# Patient Record
Sex: MALE | Race: WHITE | NOT HISPANIC OR LATINO | Employment: OTHER | URBAN - METROPOLITAN AREA
[De-identification: names, ages, dates, MRNs, and addresses within clinical notes are randomized per-mention and may not be internally consistent; named-entity substitution may affect disease eponyms.]

---

## 2020-07-08 ENCOUNTER — NURSING HOME VISIT (OUTPATIENT)
Dept: FAMILY MEDICINE CLINIC | Facility: CLINIC | Age: 70
End: 2020-07-08
Payer: MEDICARE

## 2020-07-08 DIAGNOSIS — F17.210 NICOTINE DEPENDENCE, CIGARETTES, UNCOMPLICATED: ICD-10-CM

## 2020-07-08 DIAGNOSIS — J44.9 CHRONIC OBSTRUCTIVE PULMONARY DISEASE, UNSPECIFIED COPD TYPE (HCC): ICD-10-CM

## 2020-07-08 DIAGNOSIS — T78.40XD ALLERGIC STATE, SUBSEQUENT ENCOUNTER: ICD-10-CM

## 2020-07-08 DIAGNOSIS — F41.9 ANXIETY: Primary | ICD-10-CM

## 2020-07-08 DIAGNOSIS — K21.9 GASTROESOPHAGEAL REFLUX DISEASE WITHOUT ESOPHAGITIS: ICD-10-CM

## 2020-07-08 DIAGNOSIS — J96.10 CHRONIC RESPIRATORY FAILURE, UNSPECIFIED WHETHER WITH HYPOXIA OR HYPERCAPNIA (HCC): ICD-10-CM

## 2020-07-08 PROCEDURE — 99309 SBSQ NF CARE MODERATE MDM 30: CPT | Performed by: NURSE PRACTITIONER

## 2020-07-15 ENCOUNTER — NURSING HOME VISIT (OUTPATIENT)
Dept: FAMILY MEDICINE CLINIC | Facility: CLINIC | Age: 70
End: 2020-07-15
Payer: MEDICARE

## 2020-07-15 DIAGNOSIS — J96.10 CHRONIC RESPIRATORY FAILURE, UNSPECIFIED WHETHER WITH HYPOXIA OR HYPERCAPNIA (HCC): ICD-10-CM

## 2020-07-15 DIAGNOSIS — J44.9 CHRONIC OBSTRUCTIVE PULMONARY DISEASE, UNSPECIFIED COPD TYPE (HCC): Primary | ICD-10-CM

## 2020-07-15 DIAGNOSIS — F31.9 BIPOLAR AFFECTIVE DISORDER, REMISSION STATUS UNSPECIFIED (HCC): ICD-10-CM

## 2020-07-15 DIAGNOSIS — F41.9 ANXIETY: ICD-10-CM

## 2020-07-15 DIAGNOSIS — K21.9 GASTROESOPHAGEAL REFLUX DISEASE WITHOUT ESOPHAGITIS: ICD-10-CM

## 2020-07-15 DIAGNOSIS — E87.6 HYPOKALEMIA: ICD-10-CM

## 2020-07-15 PROCEDURE — 99309 SBSQ NF CARE MODERATE MDM 30: CPT | Performed by: INTERNAL MEDICINE

## 2020-07-16 VITALS
HEART RATE: 74 BPM | DIASTOLIC BLOOD PRESSURE: 67 MMHG | TEMPERATURE: 98.7 F | SYSTOLIC BLOOD PRESSURE: 118 MMHG | WEIGHT: 145.2 LBS | OXYGEN SATURATION: 99 % | RESPIRATION RATE: 18 BRPM

## 2020-07-16 VITALS
DIASTOLIC BLOOD PRESSURE: 78 MMHG | HEART RATE: 76 BPM | RESPIRATION RATE: 18 BRPM | TEMPERATURE: 97.6 F | OXYGEN SATURATION: 98 % | WEIGHT: 145 LBS | SYSTOLIC BLOOD PRESSURE: 138 MMHG

## 2020-07-16 PROBLEM — F17.210 NICOTINE DEPENDENCE, CIGARETTES, UNCOMPLICATED: Status: ACTIVE | Noted: 2020-07-16

## 2020-07-16 PROBLEM — T78.40XA ALLERGIES: Status: ACTIVE | Noted: 2020-07-16

## 2020-07-16 RX ORDER — ALBUTEROL SULFATE 2.5 MG/3ML
2.5 SOLUTION RESPIRATORY (INHALATION) EVERY 6 HOURS PRN
COMMUNITY

## 2020-07-16 RX ORDER — ACETAMINOPHEN 325 MG/1
650 TABLET ORAL EVERY 6 HOURS PRN
COMMUNITY

## 2020-07-16 RX ORDER — CALCIUM CARBONATE 200(500)MG
1 TABLET,CHEWABLE ORAL EVERY 6 HOURS PRN
COMMUNITY

## 2020-07-16 RX ORDER — BENZONATATE 100 MG/1
100 CAPSULE ORAL 3 TIMES DAILY PRN
COMMUNITY

## 2020-07-16 RX ORDER — IPRATROPIUM BROMIDE AND ALBUTEROL SULFATE 2.5; .5 MG/3ML; MG/3ML
3 SOLUTION RESPIRATORY (INHALATION) EVERY 6 HOURS PRN
COMMUNITY

## 2020-07-16 RX ORDER — FAMOTIDINE 10 MG
10 TABLET ORAL 2 TIMES DAILY
COMMUNITY
End: 2020-08-27 | Stop reason: ALTCHOICE

## 2020-07-16 RX ORDER — GUAIFENESIN 600 MG
1200 TABLET, EXTENDED RELEASE 12 HR ORAL EVERY 12 HOURS SCHEDULED
COMMUNITY

## 2020-07-16 RX ORDER — LORATADINE 10 MG/1
10 TABLET ORAL DAILY
COMMUNITY
End: 2021-02-03

## 2020-07-16 RX ORDER — BUSPIRONE HYDROCHLORIDE 10 MG/1
10 TABLET ORAL 2 TIMES DAILY
COMMUNITY

## 2020-07-16 RX ORDER — DIPHENHYDRAMINE HCL 25 MG
25 TABLET ORAL EVERY 6 HOURS PRN
COMMUNITY
End: 2021-07-31

## 2020-07-16 RX ORDER — FLUTICASONE FUROATE AND VILANTEROL 200; 25 UG/1; UG/1
1 POWDER RESPIRATORY (INHALATION) DAILY
COMMUNITY
End: 2020-10-26

## 2020-07-16 NOTE — PROGRESS NOTES
Progress Note    Patient location:[facility]    Reason for visit: F/U COPD, Anxiety, History of bipolar disorder,GERD    Patients care was coordinated with nursing facility staff  Recent vitals, labs and updated medications were reviewed on [facility]  Past Medical, surgical, social, medication and allergy history and patients previous records reviewed  Problem List Items Addressed This Visit        Digestive    Gastroesophageal reflux disease without esophagitis     Stable on famotidine            Respiratory    [facility]D [facility]chronic obstructive pulmonary disease) ([facility] ) - Primary     Patient has advanced COPD  Currently remains on Breo inhaler, Incruse Ellipta, nebulizer treatment and albuterol inhaler p r n  Patient has been requesting to keep albuterol inhaler on his bedside, reports having frequent episodes of shortness of breath and not getting p r n  Inhalers on time  Per nurse patient has history of abusing inhalers, previously has been taking it every 1-2 hours by himself  Oxygen saturation for the most part remains between 97-98%  Patient has been on chronic oxygen therapy  I explained to the patient regarding potential side effects related to frequently taking albuterol including tachy arrhythmias  Patient continues to complaint of being short of breath most of the time  Will start patient on maintenance dose of prednisone 10 mg daily for now and gradually taper  Patient also has history of anxiety likely contributing to subjective feeling of being out of breath  Patient will be started on Paxil 20 mg daily  Patient reports taking Benadryl in the past which has helped with allergies  Per staff Benadryl is unavailable at present due to short supply  Continue Claritin for now  Above explained to the patient  Care coordinated with respiratory therapist          Chronic respiratory failure ([facility] )     Continue maintenance inhalers and continues oxygen    Add prednisone 10 mg daily            Other    Anxiety     Uncontrolled at present  Patient reports being on Paxil in the past   Will resume at 20 mg daily  Continue buspirone 10 mg b i d  Bipolar disorder [facility])     Patient reports having history of bipolar disorder  Had been on Lamictal in the past   Will consult Dr Rashawn Rosario from psychiatric service for further recommendations         Hypokalemia     Patient has history of hypokalemia  BMP is due on 07/17/2020  Will follow             HPI:   Pt is being seen per request of the nurse  Pt has been asking to keep his Albuterol inhaler at the bed side  Per nurse patient has history of abusing his inhalers by using them every 1-2 hours  Patient reports being short of breath most of the time  Remains on chronic oxygen therapy  Additionally states [facility] does not work for him  Patient appears anxious  Reports having history of anxiety and bipolar disorder in the past for which she was on Paxil and Lamictal before  Patient denies any fever chills nausea vomiting or diarrhea    He is additionally requesting to resume Benadryl  Upset about not receiving above  Patient had been on Benadryl in the past, lately it was switched over to Claritin secondary to short supply of Benadryl  Review of Systems   Constitutional: Positive for fatigue  Negative for chills and fever  HENT: Negative for nosebleeds and rhinorrhea  Eyes: Negative for discharge and redness  Respiratory: Positive for shortness of breath  Negative for cough, chest tightness, wheezing and stridor  Cardiovascular: Negative for chest pain and leg swelling  Gastrointestinal: Negative for abdominal distention, abdominal pain, diarrhea and vomiting  Genitourinary: Negative for dysuria, flank pain and hematuria  Musculoskeletal: Positive for gait problem  Negative for arthralgias and back pain  Skin: Negative for pallor  Neurological: Positive for weakness (Generalized)   Negative for tremors, seizures, syncope and headaches  Psychiatric/Behavioral: Negative for agitation, behavioral problems and confusion  The patient is nervous/anxious  Impaired short term memory noticed by nursing staff       Past Medical History:   Past Medical History:   Diagnosis Date    Acute and chronic respiratory failure with hypoxia ([facility] )     COPD (chronic obstructive pulmonary disease) ([facility] )     Delirium     Hypokalemia     Hyponatremia          Past Surgical History: No past surgical history on file  Social History:     Social History     Tobacco Use   Smoking Status Former Smoker   Tobacco Comment    Currently uses nicotine patches       Social History     Substance and Sexual Activity   Alcohol Use Not on file           Family History: No family history on file  Allergies:    Allergies   Allergen Reactions    Penicillins            Medications:   Current Outpatient Medications:     acetaminophen (TYLENOL) 325 mg tablet, Take 650 mg by mouth every 6 (six) hours as needed, Disp: , Rfl:     albuterol (2 5 mg/3 mL) 0 083 % nebulizer solution, Take 2 5 mg by nebulization every 6 (six) hours as needed, Disp: , Rfl:     benzonatate (TESSALON PERLES) 100 mg capsule, Take 100 mg by mouth 3 (three) times a day as needed, Disp: , Rfl:     busPIRone (BUSPAR) 10 mg tablet, Take 10 mg by mouth 2 (two) times a day, Disp: , Rfl:     calcium carbonate (TUMS) 500 mg chewable tablet, Chew 1 tablet every 6 (six) hours as needed, Disp: , Rfl:     diphenhydrAMINE (BENADRYL) 25 mg tablet, Take 25 mg by mouth every 6 (six) hours as needed, Disp: , Rfl:     famotidine (PEPCID) 10 mg tablet, Take 10 mg by mouth 2 (two) times a day, Disp: , Rfl:     fluticasone-vilanterol (BREO ELLIPTA) 200-25 MCG/INH inhaler, Inhale 1 puff daily Rinse mouth after use , Disp: , Rfl:     guaiFENesin (MUCINEX) 600 mg 12 hr tablet, Take 1,200 mg by mouth every 12 (twelve) hours, Disp: , Rfl:     ipratropium-albuterol (DUO-NEB) 0 5-2 5 mg/3 mL nebulizer solution, Take 3 mL by nebulization every 6 (six) hours, Disp: , Rfl:     loratadine (CLARITIN) 10 mg tablet, Take 10 mg by mouth daily, Disp: , Rfl:     nicotine (NICODERM CQ) 7 mg/24hr TD 24 hr patch, Place 1 patch on the skin every 24 hours, Disp: , Rfl:     sodium chloride (OCEAN) 0 65 % nasal spray, 1 spray into each nostril every 12 (twelve) hours as needed, Disp: , Rfl:     umeclidinium bromide (INCRUSE ELLIPTA) 62 5 mcg/inh AEPB inhaler, Inhale 1 puff daily, Disp: , Rfl:     Updated list was reviewed in [facility] of facility  Vitals:    07/16/20 1750   BP: 138/78   Pulse: 76   Resp: 18   Temp: 97 6 °F (36 4 °C)   SpO2: 98%         Physical Exam   Constitutional: He appears well-developed and well-nourished  No distress  Eyes: Right eye exhibits no discharge  Left eye exhibits no discharge  No scleral icterus  Cardiovascular: Normal rate, regular rhythm and normal heart sounds  Pulmonary/Chest: No stridor  He has no wheezes  Decrease BS B/L   Abdominal: Soft  He exhibits no distension  There is no tenderness  There is no guarding  Musculoskeletal: He exhibits no edema  Neurological: He is alert  No cranial nerve deficit  Skin: He is not diaphoretic  No pallor  Psychiatric: He has a normal mood and affect  anxious       Diagnostic Data:    Labs done in 5/20 revealed   Na 137, K 3 8, BUN 8, Creatinine   84  HGB 11 0, WBC 6 4,     Additional Notes:   Start Prednisone 10 mg daily  Start Paxil  Consult psychiatrist for hustory of bipolar disorder and evaluate need for lamictal  Dc incruse   Continue nebulizer treatment, Breo and prn Albuterol  Repeat labs on 7/17

## 2020-07-16 NOTE — ASSESSMENT & PLAN NOTE
Patient has advanced COPD  Currently remains on Breo inhaler, Incruse Ellipta, nebulizer treatment and albuterol inhaler p r n  Patient has been requesting to keep albuterol inhaler on his bedside, reports having frequent episodes of shortness of breath and not getting p r n  Inhalers on time  Per nurse patient has history of abusing inhalers, previously has been taking it every 1-2 hours by himself  Oxygen saturation for the most part remains between 97-98%  Patient has been on chronic oxygen therapy  I explained to the patient regarding potential side effects related to frequently taking albuterol including tachy arrhythmias  Patient continues to complaint of being short of breath most of the time  Will start patient on maintenance dose of prednisone 10 mg daily for now and gradually taper  Patient also has history of anxiety likely contributing to subjective feeling of being out of breath  Patient will be started on Paxil 20 mg daily  Patient reports taking Benadryl in the past which has helped with allergies  Per staff Benadryl is unavailable at present due to short supply  Continue Claritin for now    Above explained to the patient  Care coordinated with respiratory therapist

## 2020-07-16 NOTE — PROGRESS NOTES
[facility]  [address] [facility], Counts include 234 beds at the [facility] [place]  [phone]      PROGRESS NOTE  Name: Veronica Templeton  AGE: 69 y o  SEX: male    MRN: [id number]    DATE OF ENCOUNTER:  07/08/2020    Patient Location     [facility]    Reason For Visit/ Chief Complaint     Follow-up due to change in provider     Patients care was coordinated with nursing facility staff  Recent vitals, labs and [facility]  Past Medical, surgical, social, medication and allergy history and patients previous records reviewed  1  Anxiety  Assessment & Plan:  · Continue buspirone 10 mg b i d  For anxiety  · Will discuss with Dr Susan Horta increasing therapy or providing additional therapy for anxiety  2  Chronic respiratory failure, unspecified whether with hypoxia or hypercapnia ([facility])  Assessment & Plan:  · Continue ipratropium-albuterol 0 5-2 5 every 6 hours  3  Chronic obstructive pulmonary disease, unspecified COPD type ([facility] )  Assessment & Plan:  · Continue chronic oxygen therapy  · Continue Breo Ellipta 1 puff daily  · Continue Incruse Ellipta 1 puff daily  · Continue guaifenesin 600 mg b i d   · Continue benzonatate 100 mg every 8 hours p r n  · Change albuterol nebulizer to every 4 hours instead of every 8 hours  · Patient has been requesting to have hand-held albuterol at bedside  Past treatment on time  Patient has been previously known to abuse inhaled and use it more than his it is prescribed  I advised patient that I will talk to Dr Susan Horta regarding this  · Patient is in no acute respiratory distress currently and review of his oxygen sat have him at 95% or greater  Nurse also reports patient does not appear in any acute respiratory distress and oxygen sats are normal when he requests rescue inhaler    · Patient counseled on appropriate inhaler use, different inhaler types, and anxiety  4  Gastroesophageal reflux disease without esophagitis  Assessment & Plan:  · Stable  · Patient remains on famotidine 10 mg 2 tablets b i d       5  Nicotine dependence, cigarettes, uncomplicated  Assessment & Plan:  · Patient is currently on nicotine patch 24 hour 7 mg      6  Allergic state, subsequent encounter  Assessment & Plan:  · Patient remains on Claritin 10 mg          HPI      Johnathan Zapien is a 69 y o  male who is being seen today for was being seen today for a follow-up visit as he is currently under our service as of this week  Patient had previously been under the service of Dr Jem Mercado  Nurse requested patient be seen as patient has been requesting inhaler at bedside  It has been reported Mr Genesis Rivera abuses his medication and uses it more frequently than prescribed and sometimes almost emptying the unit  He is currently taking buspirone for anxiety  Nurse reports he has periods of forgetfulness  On examination patient is in no acute distress with oxygen saturation is 99% room air and the patient is requesting inhaler  Patient reports he only gets rescue inhaler when nurses give it to him and he reports they do not give it to him on time  Advised patient that he is also getting albuterol throughout the day in the form of a nebulizer  Patient reports that is on a maintenance inhaler  Advised patient this is also a rescue medicine and it also contains albuterol  Patient argumentative and stating it is not a rescue medication  Patient denies fever, chills, cough, nausea, vomiting, abdominal pain, chest pain, urinary symptoms  Review of Systems   Constitutional: Positive for fatigue  Negative for chills and fever  HENT: Negative for nosebleeds and rhinorrhea  Eyes: Negative for discharge and redness  Respiratory: Positive for shortness of breath  Negative for cough, chest tightness, wheezing and stridor      Cardiovascular: Negative for chest pain and leg swelling  Gastrointestinal: Negative for abdominal distention, abdominal pain, diarrhea and vomiting  Genitourinary: Negative for hematuria  Musculoskeletal: Positive for gait problem  Negative for arthralgias and back pain  Skin: Negative for pallor  Neurological: Positive for weakness (Generalized)  Negative for tremors, seizures and syncope  Psychiatric/Behavioral: Positive for behavioral problems  Negative for agitation  Past Medical History:   Diagnosis Date    Acute and chronic respiratory failure with hypoxia (HCC)     COPD (chronic obstructive pulmonary disease) (HCC)     Delirium     Hypokalemia     Hyponatremia        No past surgical history on file  Social History     Social History     Tobacco Use   Smoking Status Former Smoker   Tobacco Comment    Currently uses nicotine patches       Social History     Substance and Sexual Activity   Alcohol Use Not on file        No family history on file       Allergies   Allergen Reactions    Penicillins        Medications       Current Outpatient Medications:     acetaminophen (TYLENOL) 325 mg tablet, Take 650 mg by mouth every 6 (six) hours as needed, Disp: , Rfl:     albuterol (2 5 mg/3 mL) 0 083 % nebulizer solution, Take 2 5 mg by nebulization every 6 (six) hours as needed, Disp: , Rfl:     benzonatate (TESSALON PERLES) 100 mg capsule, Take 100 mg by mouth 3 (three) times a day as needed, Disp: , Rfl:     busPIRone (BUSPAR) 10 mg tablet, Take 10 mg by mouth 2 (two) times a day, Disp: , Rfl:     calcium carbonate (TUMS) 500 mg chewable tablet, Chew 1 tablet every 6 (six) hours as needed, Disp: , Rfl:     diphenhydrAMINE (BENADRYL) 25 mg tablet, Take 25 mg by mouth every 6 (six) hours as needed, Disp: , Rfl:     famotidine (PEPCID) 10 mg tablet, Take 10 mg by mouth 2 (two) times a day, Disp: , Rfl:     fluticasone-vilanterol (BREO ELLIPTA) 200-25 MCG/INH inhaler, Inhale 1 puff daily Rinse mouth after use , Disp: , Rfl:    guaiFENesin (MUCINEX) 600 mg 12 hr tablet, Take 1,200 mg by mouth every 12 (twelve) hours, Disp: , Rfl:     ipratropium-albuterol (DUO-NEB) 0 5-2 5 mg/3 mL nebulizer solution, Take 3 mL by nebulization every 6 (six) hours, Disp: , Rfl:     loratadine (CLARITIN) 10 mg tablet, Take 10 mg by mouth daily, Disp: , Rfl:     nicotine (NICODERM CQ) 7 mg/24hr TD 24 hr patch, Place 1 patch on the skin every 24 hours, Disp: , Rfl:     sodium chloride (OCEAN) 0 65 % nasal spray, 1 spray into each nostril every 12 (twelve) hours as needed, Disp: , Rfl:     umeclidinium bromide (INCRUSE ELLIPTA) 62 5 mcg/inh AEPB inhaler, Inhale 1 puff daily, Disp: , Rfl:     Updated list was reviewed in [facility] system of facility  Vitals:    07/08/20 0757   BP: 118/67   Pulse: 74   Resp: 18   Temp: 98 7 °F (37 1 °C)   SpO2: 99%       Physical Exam   Constitutional: He appears well-developed and well-nourished  No distress  HENT:   Head: Normocephalic and atraumatic  Eyes: Right eye exhibits no discharge  Left eye exhibits no discharge  No scleral icterus  Cardiovascular: Normal rate and regular rhythm  Pulmonary/Chest: Effort normal and breath sounds normal  No stridor  No respiratory distress  He has no wheezes  He has no rales  Abdominal: Soft  Bowel sounds are normal  He exhibits no distension  There is no tenderness  There is no guarding  Musculoskeletal: Normal range of motion  He exhibits no edema or deformity  Neurological: He is alert  Skin: Skin is warm and dry  No rash noted  He is not diaphoretic  No erythema  Psychiatric: His speech is normal  His mood appears anxious  He is agitated  No focal neurological changes noted  Diagnostic Data     Recent labs were reviewed  Labs dated 05/20/2020   WBC 6 4, HGB 11, platelets 220  , K 3 8, BUN 8, Cr 0 84    Additional Notes     Care coordination with nurse and Dr Gladys Morris    Spent greater than 25 minutes in patient care coordination, review of records, and counseling patient      This was electronically signed by STEVIE Valerio

## 2020-07-16 NOTE — ASSESSMENT & PLAN NOTE
· Continue chronic oxygen therapy  · Continue Breo Ellipta 1 puff daily  · Continue Incruse Ellipta 1 puff daily  · Continue guaifenesin 600 mg b i d   · Continue benzonatate 100 mg every 8 hours p r n  · Change albuterol nebulizer to every 4 hours instead of every 8 hours  · Patient has been requesting to have hand-held albuterol at bedside  Past treatment on time  Patient has been previously known to abuse inhaled and use it more than his it is prescribed  I advised patient that I will talk to Dr Charbel Garrett regarding this  · Patient is in no acute respiratory distress currently and review of his oxygen sat have him at 95% or greater  Nurse also reports patient does not appear in any acute respiratory distress and oxygen sats are normal when he requests rescue inhaler  · Patient counseled on appropriate inhaler use, different inhaler types, and anxiety

## 2020-07-16 NOTE — ASSESSMENT & PLAN NOTE
Patient reports having history of bipolar disorder    Had been on Lamictal in the past   Will consult Dr Rj Delgado from psychiatric service for further recommendations

## 2020-07-16 NOTE — ASSESSMENT & PLAN NOTE
· Continue buspirone 10 mg b i d  For anxiety  · Will discuss with Dr Petey Valle increasing therapy or providing additional therapy for anxiety

## 2020-07-16 NOTE — ASSESSMENT & PLAN NOTE
Uncontrolled at present  Patient reports being on Paxil in the past   Will resume at 20 mg daily  Continue buspirone 10 mg b i d  Speaking Coherently

## 2020-07-22 ENCOUNTER — NURSING HOME VISIT (OUTPATIENT)
Dept: FAMILY MEDICINE CLINIC | Facility: CLINIC | Age: 70
End: 2020-07-22
Payer: MEDICARE

## 2020-07-22 VITALS
HEART RATE: 65 BPM | DIASTOLIC BLOOD PRESSURE: 76 MMHG | OXYGEN SATURATION: 97 % | WEIGHT: 144.5 LBS | SYSTOLIC BLOOD PRESSURE: 127 MMHG | TEMPERATURE: 98.2 F | RESPIRATION RATE: 17 BRPM

## 2020-07-22 DIAGNOSIS — F31.9 BIPOLAR AFFECTIVE DISORDER, REMISSION STATUS UNSPECIFIED (HCC): ICD-10-CM

## 2020-07-22 DIAGNOSIS — F17.210 NICOTINE DEPENDENCE, CIGARETTES, UNCOMPLICATED: ICD-10-CM

## 2020-07-22 DIAGNOSIS — J96.10 CHRONIC RESPIRATORY FAILURE, UNSPECIFIED WHETHER WITH HYPOXIA OR HYPERCAPNIA (HCC): ICD-10-CM

## 2020-07-22 DIAGNOSIS — F41.9 ANXIETY: ICD-10-CM

## 2020-07-22 DIAGNOSIS — E55.9 VITAMIN D DEFICIENCY: ICD-10-CM

## 2020-07-22 DIAGNOSIS — K21.9 GASTROESOPHAGEAL REFLUX DISEASE WITHOUT ESOPHAGITIS: ICD-10-CM

## 2020-07-22 DIAGNOSIS — J44.9 CHRONIC OBSTRUCTIVE PULMONARY DISEASE, UNSPECIFIED COPD TYPE (HCC): Primary | ICD-10-CM

## 2020-07-22 DIAGNOSIS — E87.6 HYPOKALEMIA: ICD-10-CM

## 2020-07-22 PROCEDURE — 99309 SBSQ NF CARE MODERATE MDM 30: CPT | Performed by: INTERNAL MEDICINE

## 2020-07-22 NOTE — PROGRESS NOTES
Progress Note    Patient location:  [facility] rehab    Reason for visit:  Follow-up COPD, chronic respiratory failure, anxiety, hypokalemia, vitamin-D deficiency, bipolar disorder    Patients care was coordinated with nursing facility staff  Recent vitals, labs and updated medications were reviewed on [facility]  Past Medical, surgical, social, medication and allergy history and patients previous records reviewed  Problem List Items Addressed This Visit        Digestive    Gastroesophageal reflux disease without esophagitis     Famotidine was recently switched over to omeprazole            Respiratory    COPD (chronic obstructive pulmonary disease) ([facility] ) - Primary       Patient was recently started on prednisone 10 mg daily for ongoing dyspnea in the setting of advanced COPD  Reports feeling slightly better, tends to get short of breath with minimal exertion  Continue Breo,  nebulizer treatments and guaifenesin  Discontinue Incruse Ellipta as patient is on nebulizer treatments around the clock  Consult palliative care for chronic disease management         Chronic respiratory failure (HCC)     Continue oxygen and maintenance inhalers along with prednisone 10 mg daily            Other    Anxiety     Patient was recently started on Paxil 20 mg daily  Will monitor response on above  Patient additionally remains on Buspirone 10 mg b i d  Bipolar disorder ([facility] )     Patient was recently started on Lamictal 50 mg daily  Will monitor response on above  Psych service consulted on previous encounter         Hypokalemia     Recent potassium was within normal limits         Nicotine dependence, cigarettes, uncomplicated     Continue nicotine patch         Vitamin D deficiency     Recent vitamin-D level was 9  Patient was started on vitamin-D 44122 units once a week  Repeat vitamin-D level in 3 months             HPI:  Patient is being seen for follow-up visit today    He was seen last week for increased dyspnea  Patient has advance COPD and remains on nebulizer treatment, Breo, Incruise Ellipta and guaifenesin  He was recently started on prednisone 10 mg daily  Patient reports feeling better however he is concerned about getting pneumonia as his roommate was recently diagnosed with it  There have been no reports of  any fever chills nausea vomiting diarrhea or abdominal pain  Patient is noncompliant with care at times  Refuses to shower  Has been requesting to have albuterol inhaler on his bedside however per staff he has history of inhalers abuse where he was taking puffs every 1-2 hours    Review of Systems   Constitutional: Positive for fatigue  Negative for chills and fever  HENT: Negative for nosebleeds and rhinorrhea  Eyes: Negative for discharge and redness  Respiratory: Positive for shortness of breath (With minimal activities)  Negative for cough, chest tightness, wheezing and stridor  Cardiovascular: Negative for chest pain and leg swelling  Gastrointestinal: Negative for abdominal distention, abdominal pain, diarrhea and vomiting  Genitourinary: Negative for dysuria, flank pain and hematuria  Musculoskeletal: Positive for gait problem  Negative for arthralgias and back pain  Skin: Negative for pallor  Neurological: Positive for weakness (Generalized)  Negative for tremors, seizures, syncope and headaches  Psychiatric/Behavioral: Negative for agitation, behavioral problems and confusion  Past Medical History:   Past Medical History:   Diagnosis Date    Acute and chronic respiratory failure with hypoxia (HCC)     COPD (chronic obstructive pulmonary disease) (HCC)     Delirium     Hypokalemia     Hyponatremia          Past Surgical History: No past surgical history on file          Social History:     Social History     Tobacco Use   Smoking Status Former Smoker   Tobacco Comment    Currently uses nicotine patches       Social History Substance and Sexual Activity   Alcohol Use Not on file           Family History: No family history on file  Allergies: Allergies   Allergen Reactions    Penicillins            Medications:   Current Outpatient Medications:     acetaminophen (TYLENOL) 325 mg tablet, Take 650 mg by mouth every 6 (six) hours as needed, Disp: , Rfl:     albuterol (2 5 mg/3 mL) 0 083 % nebulizer solution, Take 2 5 mg by nebulization every 6 (six) hours as needed, Disp: , Rfl:     benzonatate (TESSALON PERLES) 100 mg capsule, Take 100 mg by mouth 3 (three) times a day as needed, Disp: , Rfl:     busPIRone (BUSPAR) 10 mg tablet, Take 10 mg by mouth 2 (two) times a day, Disp: , Rfl:     calcium carbonate (TUMS) 500 mg chewable tablet, Chew 1 tablet every 6 (six) hours as needed, Disp: , Rfl:     diphenhydrAMINE (BENADRYL) 25 mg tablet, Take 25 mg by mouth every 6 (six) hours as needed, Disp: , Rfl:     famotidine (PEPCID) 10 mg tablet, Take 10 mg by mouth 2 (two) times a day, Disp: , Rfl:     fluticasone-vilanterol (BREO ELLIPTA) 200-25 MCG/INH inhaler, Inhale 1 puff daily Rinse mouth after use , Disp: , Rfl:     guaiFENesin (MUCINEX) 600 mg 12 hr tablet, Take 1,200 mg by mouth every 12 (twelve) hours, Disp: , Rfl:     ipratropium-albuterol (DUO-NEB) 0 5-2 5 mg/3 mL nebulizer solution, Take 3 mL by nebulization every 6 (six) hours, Disp: , Rfl:     loratadine (CLARITIN) 10 mg tablet, Take 10 mg by mouth daily, Disp: , Rfl:     nicotine (NICODERM CQ) 7 mg/24hr TD 24 hr patch, Place 1 patch on the skin every 24 hours, Disp: , Rfl:     sodium chloride (OCEAN) 0 65 % nasal spray, 1 spray into each nostril every 12 (twelve) hours as needed, Disp: , Rfl:     umeclidinium bromide (INCRUSE ELLIPTA) 62 5 mcg/inh AEPB inhaler, Inhale 1 puff daily, Disp: , Rfl:     Updated list was reviewed in [facility] of facility         Vitals:    07/22/20 1308   BP: 127/76   Pulse: 65   Resp: 17   Temp: 98 2 °F (36 8 °C) SpO2: 97%         Physical Exam   Constitutional: No distress  Eyes: Right eye exhibits no discharge  Left eye exhibits no discharge  No scleral icterus  Cardiovascular: Normal rate and regular rhythm  Pulmonary/Chest: No stridor  He has no wheezes  He has no rales  Abdominal: Soft  He exhibits no distension  There is no tenderness  There is no guarding  Musculoskeletal: He exhibits no edema  Neurological: He is alert  No cranial nerve deficit  Skin: He is not diaphoretic  No pallor  Psychiatric: He has a normal mood and affect  His behavior is normal        Diagnostic Data:    Recent labs were reviewed  Vitamin-D level was 9   CBC CMP and TSH were within normal limits    Additional Notes:   Consult palliative care team for chronic disease management

## 2020-07-22 NOTE — ASSESSMENT & PLAN NOTE
Patient was recently started on Paxil 20 mg daily  Will monitor response on above  Patient additionally remains on Buspirone 10 mg b i d

## 2020-07-22 NOTE — ASSESSMENT & PLAN NOTE
Patient was recently started on prednisone 10 mg daily for ongoing dyspnea in the setting of advanced COPD  Reports feeling slightly better, tends to get short of breath with minimal exertion  Continue Breo,  nebulizer treatments and guaifenesin  Discontinue Incruse Ellipta as patient is on nebulizer treatments around the clock     Consult palliative care for chronic disease management

## 2020-07-22 NOTE — ASSESSMENT & PLAN NOTE
Recent vitamin-D level was 9  Patient was started on vitamin-D 50370 units once a week    Repeat vitamin-D level in 3 months

## 2020-07-22 NOTE — ASSESSMENT & PLAN NOTE
Patient was recently started on Lamictal 50 mg daily  Will monitor response on above    Psych service consulted on previous encounter

## 2020-08-26 ENCOUNTER — NURSING HOME VISIT (OUTPATIENT)
Dept: FAMILY MEDICINE CLINIC | Facility: CLINIC | Age: 70
End: 2020-08-26
Payer: MEDICARE

## 2020-08-26 VITALS
WEIGHT: 159 LBS | RESPIRATION RATE: 18 BRPM | TEMPERATURE: 98.2 F | DIASTOLIC BLOOD PRESSURE: 72 MMHG | OXYGEN SATURATION: 98 % | SYSTOLIC BLOOD PRESSURE: 127 MMHG | HEART RATE: 74 BPM

## 2020-08-26 DIAGNOSIS — F41.9 ANXIETY: Primary | ICD-10-CM

## 2020-08-26 DIAGNOSIS — E55.9 VITAMIN D DEFICIENCY: ICD-10-CM

## 2020-08-26 DIAGNOSIS — E87.6 HYPOKALEMIA: ICD-10-CM

## 2020-08-26 DIAGNOSIS — J96.10 CHRONIC RESPIRATORY FAILURE, UNSPECIFIED WHETHER WITH HYPOXIA OR HYPERCAPNIA (HCC): ICD-10-CM

## 2020-08-26 DIAGNOSIS — K21.9 GASTROESOPHAGEAL REFLUX DISEASE WITHOUT ESOPHAGITIS: ICD-10-CM

## 2020-08-26 DIAGNOSIS — J44.9 CHRONIC OBSTRUCTIVE PULMONARY DISEASE, UNSPECIFIED COPD TYPE (HCC): ICD-10-CM

## 2020-08-26 DIAGNOSIS — F31.9 BIPOLAR AFFECTIVE DISORDER, REMISSION STATUS UNSPECIFIED (HCC): ICD-10-CM

## 2020-08-26 PROCEDURE — 99308 SBSQ NF CARE LOW MDM 20: CPT | Performed by: NURSE PRACTITIONER

## 2020-08-26 NOTE — PROGRESS NOTES
[facility]  [address] [facility], [facility] [place]  [phone]    PROGRESS NOTE    Name: Ginette Rivas  AGE: 69 y o  SEX: male    MRN: [id number]    DATE OF ENCOUNTER:  08/26/2020    Patient Location     [facility]    Reason For Visit/ Chief Complaint     Follow-up visit:  Bipolar disorder, anxiety disorder, chronic respiratory failure, COPD, hypokalemia, vitamin-D deficiency  1  Anxiety  Assessment & Plan:  · Patient remains on buspirone 10 mg b i d  And paroxetine 20 mg daily  · On examination patient appears more comfortable when section min previous visits  2  Chronic respiratory failure, unspecified whether with hypoxia or hypercapnia ([facility] )  Assessment & Plan:  · Patient remains on maintenance inhalers and prednisone  · He reports no bronchospasm exacerbations lately  · He continues to be on 2 L of oxygen continuously  3  Hypokalemia  Assessment & Plan:  · Potassium on a 07/17/2020 was 3 7       4  Vitamin D deficiency  Assessment & Plan:  · Continue vitamin-D 62499 units weekly  · Follow-up labs in several months  5  Bipolar affective disorder, remission status unspecified ([facility] )  Assessment & Plan:  · Patient in pleasant mood today  · He is currently on Lamictal 50 mg daily  · No behavioral problems have been reported recently  6  Chronic obstructive pulmonary disease, unspecified COPD type ([facility] )  Assessment & Plan:  · Patient reports breathing better  · He continues on his nebulizer treatments and oxygen continuously  7  Gastroesophageal reflux disease without esophagitis  Assessment & Plan:  · Stable  · Continue omeprazole 20 mg b i d  HPI      Ginette Rivas is a 69 y o  male who is being seen today for of follow-up visit today  Nurse reports no acute issues    His behaviors have been stable and he has not had any acute respiratory bronchospasms lately with new medication regimen  On examination patient appears pleasant and is in no acute respiratory distress  He reports he is has a good appetite and and his weights have increased  He is on continuous oxygen  He denies fever, abdominal pain, nausea, vomiting, diarrhea, constipation  Review of Systems   Constitutional: Positive for fatigue  Negative for activity change, chills and fever  HENT: Negative for congestion, nosebleeds, rhinorrhea and sore throat  Eyes: Negative for discharge and redness  Respiratory: Negative for cough, chest tightness, shortness of breath, wheezing and stridor  Cardiovascular: Negative for chest pain and leg swelling  Gastrointestinal: Negative for abdominal distention, abdominal pain, diarrhea and vomiting  Genitourinary: Negative for dysuria, flank pain and hematuria  Musculoskeletal: Positive for gait problem  Negative for arthralgias and back pain  Skin: Negative for color change, pallor and wound  Neurological: Positive for weakness (Generalized)  Negative for dizziness, tremors, seizures, syncope and headaches  Psychiatric/Behavioral: Negative for agitation, behavioral problems and confusion  Past Medical History:   Diagnosis Date    Acute and chronic respiratory failure with hypoxia ([facility])     COPD (chronic obstructive pulmonary disease) ([facility])     Delirium     Hypokalemia     Hyponatremia        No past surgical history on file      Social History     Social History     Tobacco Use   Smoking Status Former Smoker   Tobacco Comment    Currently uses nicotine patches       Social History     Substance and Sexual Activity   Alcohol Use Not on file       Family History: non-contributory    Allergies   Allergen Reactions    Penicillins        Medications       Current Outpatient Medications:     acetaminophen (TYLENOL) 325 mg tablet, Take 650 mg by mouth every 6 (six) hours as needed, Disp: , Rfl:     albuterol (2 5 mg/3 mL) 0 083 % nebulizer solution, Take 2 5 mg by nebulization every 6 (six) hours as needed, Disp: , Rfl:     benzonatate (TESSALON PERLES) 100 mg capsule, Take 100 mg by mouth 3 (three) times a day as needed, Disp: , Rfl:     busPIRone (BUSPAR) 10 mg tablet, Take 10 mg by mouth 2 (two) times a day, Disp: , Rfl:     calcium carbonate (TUMS) 500 mg chewable tablet, Chew 1 tablet every 6 (six) hours as needed, Disp: , Rfl:     diphenhydrAMINE (BENADRYL) 25 mg tablet, Take 25 mg by mouth every 6 (six) hours as needed, Disp: , Rfl:     ergocalciferol (ERGOCALCIFEROL) 1 25 MG (12964 UT) capsule, Take 50,000 Units by mouth once a week, Disp: , Rfl:     guaiFENesin (MUCINEX) 600 mg 12 hr tablet, Take 1,200 mg by mouth every 12 (twelve) hours, Disp: , Rfl:     ipratropium-albuterol (DUO-NEB) 0 5-2 5 mg/3 mL nebulizer solution, Take 3 mL by nebulization every 6 (six) hours, Disp: , Rfl:     lamoTRIgine (LaMICtal) 25 mg tablet, Take 50 mg by mouth daily, Disp: , Rfl:     loratadine (CLARITIN) 10 mg tablet, Take 10 mg by mouth daily, Disp: , Rfl:     omeprazole (PriLOSEC) 20 mg delayed release capsule, Take 20 mg by mouth 2 (two) times a day, Disp: , Rfl:     PARoxetine (PAXIL) 20 mg tablet, Take 20 mg by mouth daily, Disp: , Rfl:     predniSONE 5 mg tablet, Take 10 mg by mouth daily, Disp: , Rfl:     sodium chloride (OCEAN) 0 65 % nasal spray, 1 spray into each nostril every 12 (twelve) hours as needed, Disp: , Rfl:     fluticasone-vilanterol (BREO ELLIPTA) 200-25 MCG/INH inhaler, Inhale 1 puff daily Rinse mouth after use , Disp: , Rfl:     nicotine (NICODERM CQ) 7 mg/24hr TD 24 hr patch, Place 1 patch on the skin every 24 hours, Disp: , Rfl:     Updated list was reviewed in [address] [place] [address]  Vitals:    08/26/20 1346   BP: 127/72   Pulse: 74   Resp: 18   Temp: 98 2 °F (36 8 °C)   SpO2: 98%       Physical Exam  Vitals signs and nursing note reviewed  Constitutional:       General: He is not in acute distress       Appearance: He is well-developed  He is not diaphoretic  HENT:      Head: Normocephalic and atraumatic  Eyes:      General: No scleral icterus  Right eye: No discharge  Left eye: No discharge  Pulmonary:      Effort: No respiratory distress  Breath sounds: No wheezing  Abdominal:      General: There is no distension  Musculoskeletal:         General: No deformity  Skin:     General: Skin is dry  Neurological:      Mental Status: He is alert  Diagnostic Data     Recent labs were reviewed   07/17/2020  o Na 138, K 3 7, BUN 9, Cr 0 7  o WBC 6 8, Hgb 11 5, Hct 38    Additional Notes      Patients care was coordinated with nursing facility staff  Recent vitals, labs and updated medications were reviewed on [facility] system of facility  Past Medical, surgical, social, medication and allergy history and patients previous records reviewed       This was electronically signed by STEVIE Lorenzo

## 2020-08-27 RX ORDER — LAMOTRIGINE 25 MG/1
50 TABLET ORAL DAILY
COMMUNITY

## 2020-08-27 RX ORDER — PAROXETINE HYDROCHLORIDE 20 MG/1
10 TABLET, FILM COATED ORAL DAILY
COMMUNITY

## 2020-08-27 RX ORDER — OMEPRAZOLE 20 MG/1
20 CAPSULE, DELAYED RELEASE ORAL 2 TIMES DAILY
COMMUNITY

## 2020-08-27 RX ORDER — ERGOCALCIFEROL (VITAMIN D2) 1250 MCG
50000 CAPSULE ORAL WEEKLY
COMMUNITY

## 2020-08-27 RX ORDER — PREDNISONE 1 MG/1
7.5 TABLET ORAL DAILY
COMMUNITY
End: 2021-07-31

## 2020-08-27 NOTE — ASSESSMENT & PLAN NOTE
· Patient remains on buspirone 10 mg b i d  And paroxetine 20 mg daily  · On examination patient appears more comfortable when section min previous visits

## 2020-08-27 NOTE — ASSESSMENT & PLAN NOTE
· Patient reports breathing better  · He continues on his nebulizer treatments and oxygen continuously

## 2020-08-27 NOTE — ASSESSMENT & PLAN NOTE
· Patient remains on maintenance inhalers and prednisone  · He reports no bronchospasm exacerbations lately  · He continues to be on 2 L of oxygen continuously

## 2020-08-27 NOTE — ASSESSMENT & PLAN NOTE
· Patient in pleasant mood today  · He is currently on Lamictal 50 mg daily  · No behavioral problems have been reported recently

## 2020-10-26 ENCOUNTER — NURSING HOME VISIT (OUTPATIENT)
Dept: GERIATRICS | Facility: OTHER | Age: 70
End: 2020-10-26
Payer: MEDICARE

## 2020-10-26 VITALS
OXYGEN SATURATION: 93 % | WEIGHT: 160 LBS | SYSTOLIC BLOOD PRESSURE: 139 MMHG | RESPIRATION RATE: 18 BRPM | HEART RATE: 71 BPM | TEMPERATURE: 98.1 F | DIASTOLIC BLOOD PRESSURE: 80 MMHG

## 2020-10-26 DIAGNOSIS — F41.9 ANXIETY: ICD-10-CM

## 2020-10-26 DIAGNOSIS — J44.9 CHRONIC OBSTRUCTIVE PULMONARY DISEASE, UNSPECIFIED COPD TYPE (HCC): Primary | ICD-10-CM

## 2020-10-26 DIAGNOSIS — T78.40XD ALLERGY, SUBSEQUENT ENCOUNTER: ICD-10-CM

## 2020-10-26 DIAGNOSIS — F17.210 NICOTINE DEPENDENCE, CIGARETTES, UNCOMPLICATED: ICD-10-CM

## 2020-10-26 DIAGNOSIS — F31.9 BIPOLAR AFFECTIVE DISORDER, REMISSION STATUS UNSPECIFIED (HCC): ICD-10-CM

## 2020-10-26 DIAGNOSIS — E55.9 VITAMIN D DEFICIENCY: ICD-10-CM

## 2020-10-26 DIAGNOSIS — K21.9 GASTROESOPHAGEAL REFLUX DISEASE WITHOUT ESOPHAGITIS: ICD-10-CM

## 2020-10-26 PROCEDURE — 99309 SBSQ NF CARE MODERATE MDM 30: CPT | Performed by: INTERNAL MEDICINE

## 2020-12-10 ENCOUNTER — TELEMEDICINE (OUTPATIENT)
Dept: GERIATRICS | Facility: OTHER | Age: 70
End: 2020-12-10
Payer: MEDICARE

## 2020-12-10 VITALS
DIASTOLIC BLOOD PRESSURE: 68 MMHG | WEIGHT: 163.4 LBS | HEART RATE: 64 BPM | TEMPERATURE: 97.4 F | SYSTOLIC BLOOD PRESSURE: 123 MMHG

## 2020-12-10 DIAGNOSIS — E55.9 VITAMIN D DEFICIENCY: ICD-10-CM

## 2020-12-10 DIAGNOSIS — K21.9 GASTROESOPHAGEAL REFLUX DISEASE WITHOUT ESOPHAGITIS: ICD-10-CM

## 2020-12-10 DIAGNOSIS — J44.9 CHRONIC OBSTRUCTIVE PULMONARY DISEASE, UNSPECIFIED COPD TYPE (HCC): Primary | ICD-10-CM

## 2020-12-10 DIAGNOSIS — F31.9 BIPOLAR AFFECTIVE DISORDER, REMISSION STATUS UNSPECIFIED (HCC): ICD-10-CM

## 2020-12-10 DIAGNOSIS — F41.9 ANXIETY: ICD-10-CM

## 2020-12-10 PROCEDURE — 99309 SBSQ NF CARE MODERATE MDM 30: CPT | Performed by: INTERNAL MEDICINE

## 2021-01-04 ENCOUNTER — OFFICE VISIT (OUTPATIENT)
Dept: PULMONOLOGY | Facility: CLINIC | Age: 71
End: 2021-01-04
Payer: MEDICARE

## 2021-01-04 VITALS
WEIGHT: 164.38 LBS | TEMPERATURE: 97.6 F | SYSTOLIC BLOOD PRESSURE: 138 MMHG | BODY MASS INDEX: 22.26 KG/M2 | HEIGHT: 72 IN | HEART RATE: 80 BPM | DIASTOLIC BLOOD PRESSURE: 76 MMHG | OXYGEN SATURATION: 90 %

## 2021-01-04 DIAGNOSIS — Z87.891 PERSONAL HISTORY OF TOBACCO USE: ICD-10-CM

## 2021-01-04 DIAGNOSIS — J43.9 PULMONARY EMPHYSEMA, UNSPECIFIED EMPHYSEMA TYPE (HCC): Primary | ICD-10-CM

## 2021-01-04 PROBLEM — F17.210 NICOTINE DEPENDENCE, CIGARETTES, UNCOMPLICATED: Status: RESOLVED | Noted: 2020-07-16 | Resolved: 2021-01-04

## 2021-01-04 PROCEDURE — 99204 OFFICE O/P NEW MOD 45 MIN: CPT | Performed by: INTERNAL MEDICINE

## 2021-01-04 NOTE — ASSESSMENT & PLAN NOTE
This patient has likely quite severe COPD  I do not have any clear records of previous PFTs will but will research that through [facility]  Note made of 1 exacerbation earlier this year and then exacerbation caused by COVID-19  Lung cancer screening seems appropriate  Trial of long-acting bronchodilators could be recommended such as Anoro or equivalent  Because of exacerbations patient should be maintained on inhaled steroid although oral steroid might be equivalent for this indication  Patient may eventually need narcotic therapy for quite severe dyspnea    I recommend oxygen 3 liters/minute with activities and sleep but not at rest

## 2021-01-04 NOTE — ASSESSMENT & PLAN NOTE
Lung cancer screening seems appropriate for this patient  His smoking history is well over 30 pack years and he stopped 2 years ago

## 2021-01-04 NOTE — PROGRESS NOTES
Assessment/Plan:    COPD (chronic obstructive pulmonary disease) ([facility] )  This patient has likely quite severe COPD  I do not have any clear records of previous PFTs will but will research that through [facility]  Note made of 1 exacerbation earlier this year and then exacerbation caused by COVID-19  Lung cancer screening seems appropriate  Trial of long-acting bronchodilators could be recommended such as Anoro or equivalent  Because of exacerbations patient should be maintained on inhaled steroid although oral steroid might be equivalent for this indication  Patient may eventually need narcotic therapy for quite severe dyspnea  I recommend oxygen 3 liters/minute with activities and sleep but not at rest     Personal history of tobacco use  Lung cancer screening seems appropriate for this patient  His smoking history is well over 30 pack years and he stopped 2 years ago  Diagnoses and all orders for this visit:    Pulmonary emphysema, unspecified emphysema type ([facility] )    Personal history of tobacco use          Subjective:      Patient ID: Araceli Sands is a 69 y o  male  This patient was referred for 2 weeks with for evaluation of apparent COPD  I am not sure if all of the historical information from this patient is truly accurate as he seems quite forgetful at times  Patient states that he has been symptomatic about 5 years  Several previous hospitalizations at [facility] presumably for COPD  There is a record of an exacerbation in February of this year and then a subsequent 1 in May age the latter related to COVID-19 infection  Patient is not overly aware of this last diagnosis  Stop smoking 2 years ago  Dyspnea on exertion grade 3  Patient is very worried about suffocating at the end of his life  Wheezing at times  Not much coughing  Patient apparently produces sputum but always swallows it  No edema  Patient has been maintained on intermittent dosing with DuoNeb  Last time he left [address] was recommended  We did discuss end of life care  Patient states that he would accept short-term ventilation if needed but not long-term  Does not feel that there would be value of doing CPR  The following portions of the patient's history were reviewed and updated as appropriate: allergies, current medications, past family history, past medical history, past social history, past surgical history and problem list     Review of Systems   Constitutional: Positive for activity change and unexpected weight change  HENT: Positive for congestion and sinus pressure  Nasal obstruction   Respiratory: Positive for shortness of breath and wheezing  Negative for cough  Cardiovascular: Positive for palpitations (Patient states yes, no diagnosis made)  Negative for chest pain and leg swelling  Gastrointestinal: Negative for abdominal pain  Endocrine: Negative for cold intolerance and heat intolerance  Genitourinary: Negative for difficulty urinating  Musculoskeletal: Negative for arthralgias and myalgias  Allergic/Immunologic: Negative for environmental allergies  Neurological: Negative  Hematological: Negative for adenopathy  Objective:      /76 (BP Location: Right arm, Patient Position: Sitting, Cuff Size: Adult)   Pulse 80   Temp 97 6 °F (36 4 °C) (Tympanic)   Ht 6' (1 829 m)   Wt 74 6 kg (164 lb 6 oz)   SpO2 90% Comment: Room air at rest  BMI 22 29 kg/m²          Physical Exam  Vitals signs reviewed  Constitutional:       General: He is not in acute distress  Appearance: He is normal weight  He is ill-appearing (Chronic)  Comments: Examined in wheelchair   HENT:      Head: Normocephalic  Neck:      Musculoskeletal: No neck rigidity or muscular tenderness  Vascular: No JVD  Cardiovascular:      Rate and Rhythm: Normal rate and regular rhythm        Pulses:           Radial pulses are 3+ on the right side and 3+ on the left side  Heart sounds: Normal heart sounds  Pulmonary:      Effort: Pulmonary effort is normal  No respiratory distress  Breath sounds: Decreased air movement present  Examination of the right-lower field reveals decreased breath sounds and wheezing  Examination of the left-lower field reveals decreased breath sounds and wheezing  Decreased breath sounds and wheezing ( early expiratory) present  Comments: Speaks fairly easily in spite of complain of dyspnea  Hyper resonant percussion note  Abdominal:      General: Abdomen is flat  There is no distension  Palpations: Abdomen is soft  There is no hepatomegaly or splenomegaly  Musculoskeletal:         General: No swelling  Right lower leg: No edema  Left lower leg: No edema  Lymphadenopathy:      Cervical: No cervical adenopathy  Skin:     General: Skin is warm and dry  Findings: No rash  Neurological:      Mental Status: He is alert and oriented to person, place, and time        Comments: Suspect some memory loss   Psychiatric:         Mood and Affect: Mood normal          Behavior: Behavior normal

## 2021-02-03 ENCOUNTER — NURSING HOME VISIT (OUTPATIENT)
Dept: GERIATRICS | Facility: OTHER | Age: 71
End: 2021-02-03
Payer: MEDICARE

## 2021-02-03 VITALS
BODY MASS INDEX: 21.89 KG/M2 | RESPIRATION RATE: 20 BRPM | SYSTOLIC BLOOD PRESSURE: 134 MMHG | WEIGHT: 161.4 LBS | TEMPERATURE: 97.1 F | OXYGEN SATURATION: 96 % | HEART RATE: 76 BPM | DIASTOLIC BLOOD PRESSURE: 67 MMHG

## 2021-02-03 DIAGNOSIS — J43.9 PULMONARY EMPHYSEMA, UNSPECIFIED EMPHYSEMA TYPE (HCC): Primary | ICD-10-CM

## 2021-02-03 DIAGNOSIS — F31.9 BIPOLAR AFFECTIVE DISORDER, REMISSION STATUS UNSPECIFIED (HCC): ICD-10-CM

## 2021-02-03 DIAGNOSIS — F41.9 ANXIETY: ICD-10-CM

## 2021-02-03 DIAGNOSIS — E55.9 VITAMIN D DEFICIENCY: ICD-10-CM

## 2021-02-03 DIAGNOSIS — E87.1 HYPONATREMIA: ICD-10-CM

## 2021-02-03 DIAGNOSIS — E87.6 HYPOKALEMIA: ICD-10-CM

## 2021-02-03 PROCEDURE — 99309 SBSQ NF CARE MODERATE MDM 30: CPT | Performed by: INTERNAL MEDICINE

## 2021-02-03 NOTE — PROGRESS NOTES
[address]  [facility] [facility]   301 [facility] 83,8Th [address] 3214 [facility] [address]  49     Progress Note  Code  [facility] 32    Patient Location      [address] [place] [address] rehab    Reason for visit     Follow-up COPD, bipolar disorder, vitamin-D deficiency, anxiety    Patients care was coordinated with nursing facility staff  Recent vitals, labs and updated medications were reviewed on [facility]  Past Medical, surgical, social, medication and allergy history and patients previous records reviewed  Problem List Items Addressed This Visit        Respiratory    COPD [facility]chronic obstructive pulmonary disease) ([facility] ) - Primary      Patient is known to have severe COPD, remains on chronic oxygen and prednisone 5 mg daily  Patient additionally has been using nebulizer treatment 5 times a day  Patient reports dyspnea with minimal exertion  He is concerned about suffocating at the end of life  Recent pulmonary consultation notes reviewed  Will start patient on morphine p r n  for comfort during episodes of severe dyspnea  Above discussed with the patient, agrees with the plan            Other    Anxiety      Continue buspirone and Paxil         Bipolar disorder ([facility] 75 )      Stable  Patient remains on lamotrigine and buspirone  Hypokalemia      Stable on KCl 20 mEq daily         Vitamin D deficiency      Continue vitamin-D supplements         Hyponatremia      Resolved on last BMP  Sodium level was 142 on 12/14/20                   HPI      Patient is being seen for a follow-up visit today  He was recently evaluated by pulmonary service for COPD  Notes reviewed  Patient reports having chronic shortness of breath not any worse than usual   Patient states he has learned to live with shortness of breath  He has been taking nebulizer treatments 5 to 6 times a day  Additionally remains on prednisone 5 mg daily Patient is concerned about suffocating towards end of life  Code status discussed with the patient  Patient wishes to be a full code if there are any chances of recovery however does not wish to be kept on the ventilator long term  Patient denies any chest pain, GI or  complaints  He has not had any fever or  chills        Review of Systems   Constitutional: Positive for fatigue  Negative for chills and fever  HENT: Negative for nosebleeds and rhinorrhea  Eyes: Negative for discharge and redness  Respiratory: Positive for shortness of breath  Negative for cough, chest tightness, wheezing and stridor  Patient reports having chronic dyspnea especially with exertion   Cardiovascular: Negative for chest pain and leg swelling  Gastrointestinal: Negative for abdominal distention, abdominal pain, diarrhea and vomiting  Genitourinary: Negative for dysuria, flank pain and hematuria  Musculoskeletal: Positive for gait problem  Negative for arthralgias and back pain  Skin: Negative for pallor  Neurological: Negative for tremors, seizures, syncope, weakness (Generalized) and headaches  Psychiatric/Behavioral: Negative for agitation, behavioral problems and confusion  Past Medical History:   Diagnosis Date    Acute and chronic respiratory failure with hypoxia (HCC)     Asthma     COPD (chronic obstructive pulmonary disease) (HCC)     Delirium     Hypokalemia     Hyponatremia        No past surgical history on file      Social History     Tobacco Use   Smoking Status Former Smoker    Packs/day: 2 00    Types: Cigarettes    Quit date: 2018    Years since quitting: 3 0   Smokeless Tobacco Former User    Types: Chew       Family History   Problem Relation Age of Onset    Brain cancer Mother     Heart disease Father         Allergies   Allergen Reactions    Penicillins          Current Outpatient Medications:     acetaminophen (TYLENOL) 325 mg tablet, Take 650 mg by mouth every 6 (six) hours as needed, Disp: , Rfl:     albuterol (2 5 mg/3 mL) 0 083 % nebulizer solution, Take 2 5 mg by nebulization every 6 (six) hours as needed, Disp: , Rfl:     benzonatate (TESSALON PERLES) 100 mg capsule, Take 100 mg by mouth 3 (three) times a day as needed, Disp: , Rfl:     busPIRone (BUSPAR) 10 mg tablet, Take 10 mg by mouth 2 (two) times a day, Disp: , Rfl:     calcium carbonate (TUMS) 500 mg chewable tablet, Chew 1 tablet every 6 (six) hours as needed, Disp: , Rfl:     diphenhydrAMINE (BENADRYL) 25 mg tablet, Take 25 mg by mouth every 6 (six) hours as needed, Disp: , Rfl:     ergocalciferol (ERGOCALCIFEROL) 1 25 MG (78489 UT) capsule, Take 50,000 Units by mouth once a week, Disp: , Rfl:     guaiFENesin (MUCINEX) 600 mg 12 hr tablet, Take 1,200 mg by mouth every 12 (twelve) hours, Disp: , Rfl:     ipratropium-albuterol (DUO-NEB) 0 5-2 5 mg/3 mL nebulizer solution, Take 3 mL by nebulization every 6 (six) hours, Disp: , Rfl:     lamoTRIgine (LaMICtal) 25 mg tablet, Take 50 mg by mouth daily, Disp: , Rfl:     magnesium hydroxide (MILK OF MAGNESIA) 400 mg/5 mL oral suspension, Take by mouth daily as needed for constipation, Disp: , Rfl:     omeprazole (PriLOSEC) 20 mg delayed release capsule, Take 20 mg by mouth 2 (two) times a day, Disp: , Rfl:     PARoxetine (PAXIL) 20 mg tablet, Take 20 mg by mouth daily, Disp: , Rfl:     predniSONE 5 mg tablet, Take 7 5 mg by mouth daily , Disp: , Rfl:     sodium chloride (OCEAN) 0 65 % nasal spray, 1 spray into each nostril every 12 (twelve) hours as needed, Disp: , Rfl:     Updated list was reviewed in [facility] system of facility  Vitals:    02/03/21 1240   BP: 134/67   Pulse: 76   Resp: 20   Temp: (!) 97 1 °F (36 2 °C)   SpO2: 96%       Physical Exam  Constitutional:       General: He is not in acute distress  Appearance: He is well-developed  He is not diaphoretic  HENT:      Head: Normocephalic and atraumatic  Nose: No rhinorrhea  Eyes:      General: No scleral icterus          Right eye: No discharge  Left eye: No discharge  Neck:      Musculoskeletal: Neck supple  Cardiovascular:      Rate and Rhythm: Normal rate and regular rhythm  Pulmonary:      Breath sounds: No stridor  No wheezing  Comments: Decreased breath sounds bilaterally  Abdominal:      General: There is no distension  Palpations: Abdomen is soft  Tenderness: There is no abdominal tenderness  There is no guarding  Musculoskeletal:      Right lower leg: No edema  Left lower leg: No edema  Skin:     Coloration: Skin is not jaundiced or pale  Findings: No bruising  Neurological:      General: No focal deficit present  Mental Status: He is alert and oriented to person, place, and time  Cranial Nerves: No cranial nerve deficit  Psychiatric:         Mood and Affect: Mood normal          Behavior: Behavior normal          Diagnostic Data:    Recent labs were reviewed  labs done on 12/14/2020 revealed hemoglobin of 12 3, WBC count 6 8, platelet count 607, BUN 11 cover create report 6 3, sodium 142, potassium 3 9  Cholesterol 144, triglycerides 77, LDL 59, TSH 1 91    Additional Notes:    Start morphine 5 mg q 6 hours p r n  for severe dyspnea  Continue current meds  Will evaluate need for starting long-acting anticholinergic/beta agonist   Patient has been using nebulizer treatment 5 times a day       This note was electronically signed by Dr Jignesh Calvo

## 2021-02-04 DIAGNOSIS — J43.9 PULMONARY EMPHYSEMA, UNSPECIFIED EMPHYSEMA TYPE (HCC): Primary | ICD-10-CM

## 2021-02-04 DIAGNOSIS — J44.9 CHRONIC OBSTRUCTIVE PULMONARY DISEASE, UNSPECIFIED COPD TYPE (HCC): ICD-10-CM

## 2021-02-04 NOTE — ASSESSMENT & PLAN NOTE
Patient is known to have severe COPD, remains on chronic oxygen and prednisone 5 mg daily  Patient additionally has been using nebulizer treatment 5 times a day  Patient reports dyspnea with minimal exertion  He is concerned about suffocating at the end of life  Recent pulmonary consultation notes reviewed  Will start patient on morphine p r n  for comfort during episodes of severe dyspnea    Above discussed with the patient, agrees with the plan

## 2021-02-06 RX ORDER — MORPHINE SULFATE 100 MG/5ML
5 SOLUTION, CONCENTRATE ORAL EVERY 6 HOURS PRN
Qty: 60 ML | Refills: 0 | Status: SHIPPED | OUTPATIENT
Start: 2021-02-06

## 2021-05-26 ENCOUNTER — NURSING HOME VISIT (OUTPATIENT)
Dept: GERIATRICS | Facility: OTHER | Age: 71
End: 2021-05-26
Payer: MEDICARE

## 2021-05-26 DIAGNOSIS — K21.9 GASTROESOPHAGEAL REFLUX DISEASE WITHOUT ESOPHAGITIS: Primary | ICD-10-CM

## 2021-05-26 DIAGNOSIS — E55.9 VITAMIN D DEFICIENCY: ICD-10-CM

## 2021-05-26 DIAGNOSIS — F41.9 ANXIETY: ICD-10-CM

## 2021-05-26 DIAGNOSIS — E87.6 HYPOKALEMIA: ICD-10-CM

## 2021-05-26 DIAGNOSIS — J96.10 CHRONIC RESPIRATORY FAILURE, UNSPECIFIED WHETHER WITH HYPOXIA OR HYPERCAPNIA (HCC): ICD-10-CM

## 2021-05-26 DIAGNOSIS — F31.9 BIPOLAR AFFECTIVE DISORDER, REMISSION STATUS UNSPECIFIED (HCC): ICD-10-CM

## 2021-05-26 DIAGNOSIS — J43.9 PULMONARY EMPHYSEMA, UNSPECIFIED EMPHYSEMA TYPE (HCC): ICD-10-CM

## 2021-05-26 PROCEDURE — 99305 1ST NF CARE MODERATE MDM 35: CPT | Performed by: INTERNAL MEDICINE

## 2021-05-26 RX ORDER — FLUTICASONE FUROATE AND VILANTEROL 100; 25 UG/1; UG/1
1 POWDER RESPIRATORY (INHALATION) DAILY
COMMUNITY
End: 2022-01-31

## 2021-05-26 NOTE — PROGRESS NOTES
[address]  [address] [facility] [address]   [address] 3214 [facility] [address]  49     Annual history and physical  Code LTC32    Patient Location     [facility]    Reason for visit     Follow-up on chronic conditions    Patients care was coordinated with nursing facility staff  Recent vitals, labs and updated medications were reviewed on [facility] of facility  Past Medical, surgical, social, medication and allergy history and patients previous records reviewed  Problem List Items Addressed This Visit        Digestive    Gastroesophageal reflux disease without esophagitis - Primary     Denies heartburn/acid reflex  Tolerating omeprazole well            Respiratory    COPD (chronic obstructive pulmonary disease) ([facility] )     Patient has end-stage COPD, remains on oxygen supplementation chronically  Endorses some shortness of breath on exertion  No increase in cough or sputum production from baseline  prednisone 5 mg daily and has been using nebulizer treatment 5 times a day  Chronic respiratory failure (HCC)       Continues to be on supplemental oxygen  However denies any respiratory distress or wheezing            Other    Anxiety     Continue Paxil and buspirone          Bipolar disorder (HCC)     Stable  Tolerating lamotrigine and buspirone well         Hypokalemia     Resolved  On potassium supplements         Vitamin D deficiency     Continue vitamin-D supplementation                   HPI       Patient is a 18-year-old male, with past medical history pertinent for anxiety, bipolar disorder, severe COPD, personal history of tobacco abuse, pulmonary emphysema who was seen today in [facility] for follow-up  Patient stated that he has been symptomatic for about 5 years  He has had several previous hospitalizations at [facility] for COPD  There is a record of an exacerbation in February of this year and then a subsequent 1 in May  Patient also had COVID-19 infection earlier this year  He stopped smoking 2 years ago  His dyspnea on exertion is grade 3  Patient is very worried about suffocating at the end of his life  He denies any wheezing, he is not coughing much at this time  No edema  On my assessment today patient was awake, alert and oriented x3  He was comfortable on supplemental oxygen  He is a mouth breather  He denied chest pain, nausea, vomiting, constipation any  complaints  He denied backache, he denied paresthesia as, he denies joint aches  Code status-  During my visit I also discussed about living will and code status  He stated that he wants to be on ventilator if it would be required for a day or 2, however he does not want to be on a ventilator for prolonged time  Review of Systems   Constitutional: Negative for chills and fever  HENT: Negative for congestion, ear pain and sore throat  Eyes: Negative for pain and visual disturbance  Respiratory: Negative for cough and shortness of breath  Cardiovascular: Negative for chest pain and palpitations  Gastrointestinal: Negative for abdominal pain and vomiting  Genitourinary: Negative for dysuria and hematuria  Musculoskeletal: Negative for arthralgias and back pain  Skin: Negative for color change and rash  Neurological: Negative for seizures and syncope  All other systems reviewed and are negative        Past Medical History:   Diagnosis Date    Acute and chronic respiratory failure with hypoxia (HCC)     Asthma     COPD (chronic obstructive pulmonary disease) (HCC)     Delirium     Hypokalemia     Hyponatremia        PSH: Not known    Social History     Tobacco Use   Smoking Status Former Smoker    Packs/day: 2 00    Types: Cigarettes    Quit date: 2018    Years since quitting: 3 4   Smokeless Tobacco Former User    Types: [place]       Family History   Problem Relation Age of Onset    Brain cancer Mother     Heart disease Father         Allergies   Allergen Reactions    Penicillins          Current Outpatient Medications:     acetaminophen (TYLENOL) 325 mg tablet, Take 650 mg by mouth every 6 (six) hours as needed, Disp: , Rfl:     albuterol (2 5 mg/3 mL) 0 083 % nebulizer solution, Take 2 5 mg by nebulization every 6 (six) hours as needed, Disp: , Rfl:     benzonatate (TESSALON PERLES) 100 mg capsule, Take 100 mg by mouth 3 (three) times a day as needed, Disp: , Rfl:     busPIRone (BUSPAR) 10 mg tablet, Take 10 mg by mouth 2 (two) times a day, Disp: , Rfl:     calcium carbonate (TUMS) 500 mg chewable tablet, Chew 1 tablet every 6 (six) hours as needed, Disp: , Rfl:     diphenhydrAMINE (BENADRYL) 25 mg tablet, Take 25 mg by mouth every 6 (six) hours as needed, Disp: , Rfl:     ergocalciferol (ERGOCALCIFEROL) 1 25 MG (89209 UT) capsule, Take 50,000 Units by mouth once a week, Disp: , Rfl:     guaiFENesin (MUCINEX) 600 mg 12 hr tablet, Take 1,200 mg by mouth every 12 (twelve) hours, Disp: , Rfl:     ipratropium-albuterol (DUO-NEB) 0 5-2 5 mg/3 mL nebulizer solution, Take 3 mL by nebulization every 6 (six) hours, Disp: , Rfl:     lamoTRIgine (LaMICtal) 25 mg tablet, Take 50 mg by mouth daily, Disp: , Rfl:     magnesium hydroxide (MILK OF MAGNESIA) 400 mg/5 mL oral suspension, Take by mouth daily as needed for constipation, Disp: , Rfl:     Morphine Sulfate, Concentrate, 20 mg/mL concentrated solution, Take 0 25 mL (5 mg total) by mouth every 6 (six) hours as needed for severe painMax Daily Amount: 20 mg, Disp: 60 mL, Rfl: 0    omeprazole (PriLOSEC) 20 mg delayed release capsule, Take 20 mg by mouth 2 (two) times a day, Disp: , Rfl:     PARoxetine (PAXIL) 20 mg tablet, Take 20 mg by mouth daily, Disp: , Rfl:     predniSONE 5 mg tablet, Take 7 5 mg by mouth daily , Disp: , Rfl:     sodium chloride (OCEAN) 0 65 % nasal spray, 1 spray into each nostril every 12 (twelve) hours as needed, Disp: , Rfl:     Updated list was reviewed in [address] [place] [address]  Vital signs-  Reviewed  Patient hemodynamically stable  Continues to be on supplemental oxygen        Physical Exam  Vitals signs and nursing note reviewed  Constitutional:       Appearance: He is well-developed  HENT:      Head: Normocephalic and atraumatic  Eyes:      Conjunctiva/sclera: Conjunctivae normal    Neck:      Musculoskeletal: Neck supple  Cardiovascular:      Rate and Rhythm: Normal rate and regular rhythm  Heart sounds: No murmur  Pulmonary:      Effort: Pulmonary effort is normal  No respiratory distress  Breath sounds: Normal breath sounds  Comments: On supplemental oxygen  Abdominal:      Palpations: Abdomen is soft  Tenderness: There is no abdominal tenderness  Skin:     General: Skin is warm and dry  Neurological:      Mental Status: He is alert  Diagnostic Data:    Recent labs were reviewed  Additional Notes:      This note was electronically signed by Dr Renetta Carreon

## 2021-05-26 NOTE — ASSESSMENT & PLAN NOTE
Patient has end-stage COPD, remains on oxygen supplementation chronically  Endorses some shortness of breath on exertion  No increase in cough or sputum production from baseline  prednisone 5 mg daily and has been using nebulizer treatment 5 times a day

## 2021-06-30 ENCOUNTER — NURSING HOME VISIT (OUTPATIENT)
Dept: GERIATRICS | Facility: OTHER | Age: 71
End: 2021-06-30
Payer: MEDICARE

## 2021-06-30 VITALS
DIASTOLIC BLOOD PRESSURE: 63 MMHG | HEART RATE: 63 BPM | OXYGEN SATURATION: 96 % | SYSTOLIC BLOOD PRESSURE: 132 MMHG | RESPIRATION RATE: 18 BRPM | WEIGHT: 156 LBS | BODY MASS INDEX: 21.16 KG/M2 | TEMPERATURE: 98.6 F

## 2021-06-30 DIAGNOSIS — J96.10 CHRONIC RESPIRATORY FAILURE, UNSPECIFIED WHETHER WITH HYPOXIA OR HYPERCAPNIA (HCC): ICD-10-CM

## 2021-06-30 DIAGNOSIS — J43.9 PULMONARY EMPHYSEMA, UNSPECIFIED EMPHYSEMA TYPE (HCC): Primary | ICD-10-CM

## 2021-06-30 DIAGNOSIS — T78.40XD ALLERGY, SUBSEQUENT ENCOUNTER: ICD-10-CM

## 2021-06-30 DIAGNOSIS — F41.9 ANXIETY: ICD-10-CM

## 2021-06-30 DIAGNOSIS — F31.9 BIPOLAR AFFECTIVE DISORDER, REMISSION STATUS UNSPECIFIED (HCC): ICD-10-CM

## 2021-06-30 PROCEDURE — 99309 SBSQ NF CARE MODERATE MDM 30: CPT | Performed by: NURSE PRACTITIONER

## 2021-06-30 NOTE — ASSESSMENT & PLAN NOTE
· Pt has severe end stage COPD  · He was seen by pulmonolgy service on 5/26/21    · Patient is currently on 3 liter oxygen chronically  · Continue Breo, albuterol, Duonebs nebs  · Continue prednisone 5 mg daily  · Continue guaifenesin 600 mg b i d

## 2021-06-30 NOTE — PROGRESS NOTES
[facility]  [address]  [phone]  CODE 032    PROGRESS NOTE    Patients care was coordinated with nursing facility staff  Recent vitals, labs and updated medications were [facility]  Past Medical, surgical, social, medication and allergy history and patients previous records reviewed  Name: Cesar Joiner  AGE: 66 y o  SEX: male    MRN: [id number]    DATE OF ENCOUNTER: 6/30/21    Patient Location      [facility] [address]    Reason For Visit/ Chief Complaint       1  Pulmonary emphysema, unspecified emphysema type ([facility] 75 )  Assessment & Plan:  · Pt has severe end stage COPD  · He was seen by pulmonolgy service on 5/26/21    · Patient is currently on 3 liter oxygen chronically  · Continue Breo, albuterol, Duonebs nebs  · Continue prednisone 5 mg daily  · Continue guaifenesin 600 mg b i d       2  Anxiety  Assessment & Plan:  · Stable  · Patient remains on Paxil 10 mg daily  · Continue buspirone 10 mg b i d       3  Bipolar affective disorder, remission status unspecified ([facility] 75 )  Assessment & Plan:  · Stable currently  No behavioral issues have been reported  · Continue Lamictal 50 mg daily  4  Allergy, subsequent encounter  Assessment & Plan:  · Patient remains on Mucinex and Benadryl  5  Chronic respiratory failure, unspecified whether with hypoxia or hypercapnia (HCC)  Assessment & Plan:  · Continue oxygen 3 L continuously  · No complaints of wheezing  HPI      Cesar Joiner is a 66 y o  male who is being seen for a follow-up visit  He is a long term care patient who is complains of sob regularly  He  recently seen pulmonologist       Nurses report no acute issues  Vitals signs have been reviewed and are stable    Recent labs have also been reviewed  On examination patient is alert awake and oriented   Patient is resting comfortably in bed is in and appears to be in no acute distress  Patient reports no pain or discomfort currently  Appetite is reported to be fair  No reports of fever, chills, nausea, vomiting, abdominal pain, chest pain,  palpitations, or problems with voiding  Review of Systems   Constitutional: Positive for fatigue  Negative for chills and fever  HENT: Negative for nosebleeds and rhinorrhea  Eyes: Negative for discharge and redness  Respiratory: Negative for cough, chest tightness, shortness of breath, wheezing and stridor  Cardiovascular: Negative for chest pain and leg swelling  Gastrointestinal: Negative for abdominal distention, abdominal pain, diarrhea and vomiting  Genitourinary: Negative for difficulty urinating, dysuria, flank pain and hematuria  Musculoskeletal: Positive for gait problem  Negative for arthralgias and back pain  Skin: Negative for pallor  Neurological: Positive for weakness (Generalized)  Negative for tremors, seizures, syncope and headaches  Psychiatric/Behavioral: Negative for agitation, behavioral problems and confusion  Past Medical History:   Diagnosis Date    Acute and chronic respiratory failure with hypoxia (HCC)     Asthma     COPD (chronic obstructive pulmonary disease) (HCC)     Delirium     Hypokalemia     Hyponatremia        History reviewed   Remains unchanged from history and physical     Social History     Social History     Tobacco Use   Smoking Status Former Smoker    Packs/day: 2 00    Types: Cigarettes    Quit date: 2018    Years since quitting: 3 4   Smokeless Tobacco Former User    Types: Chew       Social History     Substance and Sexual Activity   Alcohol Use Not Currently    Comment: former social drinker       Family History:   Family History   Problem Relation Age of Onset    Brain cancer Mother     Heart disease Father        Allergies   Allergen Reactions    Penicillins        Medications       Current Outpatient Medications:    acetaminophen (TYLENOL) 325 mg tablet, Take 650 mg by mouth every 6 (six) hours as needed, Disp: , Rfl:     albuterol (2 5 mg/3 mL) 0 083 % nebulizer solution, Take 2 5 mg by nebulization every 6 (six) hours as needed, Disp: , Rfl:     benzonatate (TESSALON PERLES) 100 mg capsule, Take 100 mg by mouth 3 (three) times a day as needed, Disp: , Rfl:     busPIRone (BUSPAR) 10 mg tablet, Take 10 mg by mouth 2 (two) times a day, Disp: , Rfl:     calcium carbonate (TUMS) 500 mg chewable tablet, Chew 1 tablet every 6 (six) hours as needed, Disp: , Rfl:     diphenhydrAMINE (BENADRYL) 25 mg tablet, Take 25 mg by mouth every 6 (six) hours as needed, Disp: , Rfl:     ergocalciferol (ERGOCALCIFEROL) 1 25 MG (53764 UT) capsule, Take 50,000 Units by mouth once a week, Disp: , Rfl:     fluticasone-vilanterol (BREO ELLIPTA) 100-25 mcg/inh inhaler, Inhale 1 puff daily Rinse mouth after use , Disp: , Rfl:     guaiFENesin (MUCINEX) 600 mg 12 hr tablet, Take 1,200 mg by mouth every 12 (twelve) hours, Disp: , Rfl:     ipratropium-albuterol (DUO-NEB) 0 5-2 5 mg/3 mL nebulizer solution, Take 3 mL by nebulization every 6 (six) hours, Disp: , Rfl:     lamoTRIgine (LaMICtal) 25 mg tablet, Take 50 mg by mouth daily, Disp: , Rfl:     magnesium hydroxide (MILK OF MAGNESIA) 400 mg/5 mL oral suspension, Take by mouth daily as needed for constipation, Disp: , Rfl:     Morphine Sulfate, Concentrate, 20 mg/mL concentrated solution, Take 0 25 mL (5 mg total) by mouth every 6 (six) hours as needed for severe painMax Daily Amount: 20 mg, Disp: 60 mL, Rfl: 0    omeprazole (PriLOSEC) 20 mg delayed release capsule, Take 20 mg by mouth 2 (two) times a day, Disp: , Rfl:     PARoxetine (PAXIL) 20 mg tablet, Take 20 mg by mouth daily, Disp: , Rfl:     predniSONE 5 mg tablet, Take 7 5 mg by mouth daily , Disp: , Rfl:     sodium chloride (OCEAN) 0 65 % nasal spray, 1 spray into each nostril every 12 (twelve) hours as needed, Disp: , Rfl: Updated list was reviewed in [facility]  Vitals:    06/30/21 1027   BP: 132/63   Pulse: 63   Resp: 18   Temp: 98 6 °F (37 °C)   SpO2: 96%       Physical Exam  Constitutional:       General: He is not in acute distress  Appearance: He is well-developed  He is not diaphoretic  HENT:      Head: Normocephalic and atraumatic  Nose: Nose normal  No rhinorrhea  Mouth/Throat:      Mouth: Mucous membranes are moist    Eyes:      General: No scleral icterus  Right eye: No discharge  Left eye: No discharge  Conjunctiva/sclera: Conjunctivae normal    Cardiovascular:      Rate and Rhythm: Normal rate  Pulses: Normal pulses  Pulmonary:      Effort: No respiratory distress  Breath sounds: No wheezing  Comments: The oxygen chronically  Abdominal:      General: There is no distension  Palpations: Abdomen is soft  Tenderness: There is no abdominal tenderness  There is no guarding  Musculoskeletal:      Cervical back: Normal range of motion and neck supple  No rigidity or tenderness  Right lower leg: No edema  Left lower leg: No edema  Skin:     General: Skin is warm and dry  Neurological:      General: No focal deficit present  Mental Status: He is alert  Psychiatric:         Mood and Affect: Mood normal          Diagnostic Data     Recent labs were reviewed   04/02/2021-BUN 6, Cr 0 64, , K 3 8, Hgb 12 6, Hct 36 8, WBC 4 8    Additional Notes      Patients care was coordinated with nursing facility staff  Recent vitals, labs and updated medications were reviewed on [facility]  Past Medical, surgical, social, medication and allergy history and patients previous records reviewed   **Please note this f/u this note was constructed using a voice recognition system  **  This was electronically signed by STEVIE Curtis

## 2021-07-30 ENCOUNTER — NURSING HOME VISIT (OUTPATIENT)
Dept: GERIATRICS | Facility: OTHER | Age: 71
End: 2021-07-30
Payer: MEDICARE

## 2021-07-30 DIAGNOSIS — F31.9 BIPOLAR AFFECTIVE DISORDER, REMISSION STATUS UNSPECIFIED (HCC): ICD-10-CM

## 2021-07-30 DIAGNOSIS — K21.9 GASTROESOPHAGEAL REFLUX DISEASE WITHOUT ESOPHAGITIS: ICD-10-CM

## 2021-07-30 DIAGNOSIS — F41.9 ANXIETY: ICD-10-CM

## 2021-07-30 DIAGNOSIS — E55.9 VITAMIN D DEFICIENCY: ICD-10-CM

## 2021-07-30 DIAGNOSIS — J96.10 CHRONIC RESPIRATORY FAILURE, UNSPECIFIED WHETHER WITH HYPOXIA OR HYPERCAPNIA (HCC): ICD-10-CM

## 2021-07-30 DIAGNOSIS — J43.9 PULMONARY EMPHYSEMA, UNSPECIFIED EMPHYSEMA TYPE (HCC): Primary | ICD-10-CM

## 2021-07-30 PROCEDURE — 99309 SBSQ NF CARE MODERATE MDM 30: CPT | Performed by: INTERNAL MEDICINE

## 2021-07-31 VITALS
OXYGEN SATURATION: 97 % | WEIGHT: 151.2 LBS | SYSTOLIC BLOOD PRESSURE: 124 MMHG | BODY MASS INDEX: 20.51 KG/M2 | DIASTOLIC BLOOD PRESSURE: 77 MMHG | TEMPERATURE: 97.5 F | RESPIRATION RATE: 18 BRPM | HEART RATE: 82 BPM

## 2021-07-31 RX ORDER — PREDNISONE 1 MG/1
5 TABLET ORAL DAILY
COMMUNITY
End: 2022-06-13

## 2021-07-31 NOTE — ASSESSMENT & PLAN NOTE
Continue Paxil and Buspirone
Continue Vitamin D supplements
Patient has known history of severe COPD  Remains on chronic oxygen therapy  Continue nebulizer treatments, Prednisone, Guiafenssin and Breo inhaler    Will check with staff to see if patient can be provided with rescue inhaler at bedside
Patient remains on chronic oxygen currently    Continue maintenance inhalers
Stable on Paxil, Buspirone and Lamotrigine
Stable on omeprazole
normal (ped)...

## 2021-07-31 NOTE — PROGRESS NOTES
[facility] [place]  [address] [facility]   [address] [address],[address] [place], [place], [address]  49     Progress Note  Code [facility] 32    Patient Location     [address] [place] [address] rehab    Reason for visit     Follow-up COPD, chronic respiratory failure, GERD   Recent vitals, labs and [facility]  Past Medical, surgical, social, medication and allergy history and patients previous records reviewed  Problem List Items Addressed This Visit        Digestive    Gastroesophageal reflux disease without esophagitis     Stable on omeprazole            Respiratory    COPD (chronic obstructive pulmonary disease) ([facility] [address] ) - Primary     Patient has known history of severe COPD  Remains on chronic oxygen therapy  Continue nebulizer treatments, Prednisone, Guiafenssin and Breo inhaler  Will check with staff to see if patient can be provided with rescue inhaler at bedside           Chronic respiratory failure [facility])     Patient remains on chronic oxygen currently  Continue maintenance inhalers            Other    Anxiety     Continue Paxil and Buspirone         Bipolar disorder (HCC)     Stable on Paxil, Buspirone and Lamotrigine         Vitamin D deficiency     Continue Vitamin D supplements               HPI       Patient is being seen for a follow-up visit   He is doing okay except continues with intermittent shortness of breath  Patient feels his COPD is progressive  Complains about not having risk inhaler at his bedside as he was using at home  Patient is currently receiving nebulizer treatment 5 times a day  In addition remains on Breo  There have been no reports of any fever chills nausea vomiting diarrhea or abdominal      Review of Systems   Constitutional: Positive for fatigue  Negative for chills and fever  HENT: Negative for nosebleeds and rhinorrhea  Eyes: Negative for discharge and redness  Respiratory: Positive for shortness of breath  Negative for cough, chest tightness, wheezing and stridor  Pt is a outh breather   Cardiovascular: Negative for chest pain and leg swelling  Gastrointestinal: Negative for abdominal distention, abdominal pain, diarrhea and vomiting  Genitourinary: Negative for dysuria, flank pain and hematuria  Musculoskeletal: Positive for gait problem  Negative for arthralgias and back pain  Skin: Negative for pallor  Neurological: Positive for weakness (Generalized)  Negative for tremors, seizures, syncope and headaches  Psychiatric/Behavioral: Negative for agitation, behavioral problems and confusion  Past Medical History:   Diagnosis Date    Acute and chronic respiratory failure with hypoxia (HCC)     Asthma     COPD (chronic obstructive pulmonary disease) (HCC)     Delirium     Hypokalemia     Hyponatremia        No past surgical history on file      Social History     Tobacco Use   Smoking Status Former Smoker    Packs/day: 2 00    Types: Cigarettes    Quit date: 2018    Years since quitting: 3 5   Smokeless Tobacco Former User    Types: Chew       Family History   Problem Relation Age of Onset    Brain cancer Mother     Heart disease Father         Allergies   Allergen Reactions    Penicillins          Current Outpatient Medications:     acetaminophen (TYLENOL) 325 mg tablet, Take 650 mg by mouth every 6 (six) hours as needed, Disp: , Rfl:     albuterol (2 5 mg/3 mL) 0 083 % nebulizer solution, Take 2 5 mg by nebulization every 6 (six) hours as needed, Disp: , Rfl:     benzonatate (TESSALON PERLES) 100 mg capsule, Take 100 mg by mouth 3 (three) times a day as needed, Disp: , Rfl:     busPIRone (BUSPAR) 10 mg tablet, Take 10 mg by mouth 2 (two) times a day, Disp: , Rfl:     calcium carbonate (TUMS) 500 mg chewable tablet, Chew 1 tablet every 6 (six) hours as needed, Disp: , Rfl:     ergocalciferol (ERGOCALCIFEROL) 1 25 MG (56349 UT) capsule, Take 50,000 Units by mouth once a week, Disp: , Rfl:     fluticasone-vilanterol (BREO ELLIPTA) 100-25 mcg/inh inhaler, Inhale 1 puff daily Rinse mouth after use , Disp: , Rfl:     guaiFENesin (MUCINEX) 600 mg 12 hr tablet, Take 1,200 mg by mouth every 12 (twelve) hours, Disp: , Rfl:     ipratropium-albuterol (DUO-NEB) 0 5-2 5 mg/3 mL nebulizer solution, Take 3 mL by nebulization every 6 (six) hours, Disp: , Rfl:     lamoTRIgine (LaMICtal) 25 mg tablet, Take 50 mg by mouth daily, Disp: , Rfl:     magnesium hydroxide (MILK OF MAGNESIA) 400 mg/5 mL oral suspension, Take by mouth daily as needed for constipation, Disp: , Rfl:     Morphine Sulfate, Concentrate, 20 mg/mL concentrated solution, Take 0 25 mL (5 mg total) by mouth every 6 (six) hours as needed for severe painMax Daily Amount: 20 mg, Disp: 60 mL, Rfl: 0    omeprazole (PriLOSEC) 20 mg delayed release capsule, Take 20 mg by mouth 2 (two) times a day, Disp: , Rfl:     PARoxetine (PAXIL) 20 mg tablet, Take 20 mg by mouth daily, Disp: , Rfl:     predniSONE 5 mg tablet, Take 7 5 mg by mouth daily , Disp: , Rfl:     sodium chloride (OCEAN) 0 65 % nasal spray, 1 spray into each nostril every 12 (twelve) hours as needed, Disp: , Rfl:     Updated list was reviewed in [facility] of facility  Vitals:    07/30/21 1328   BP: 124/77   Pulse: 82   Resp: 18   Temp: 97 5 °F (36 4 °C)   SpO2: 97%       Physical Exam  Constitutional:       General: He is not in acute distress  Appearance: He is well-developed  He is not diaphoretic  HENT:      Head: Normocephalic and atraumatic  Nose: No rhinorrhea  Eyes:      General: No scleral icterus  Right eye: No discharge  Left eye: No discharge  Cardiovascular:      Rate and Rhythm: Normal rate and regular rhythm  Pulmonary:      Breath sounds: No stridor  No wheezing  Comments: Decrease BS B/L  Abdominal:      General: There is no distension  Palpations: Abdomen is soft  Tenderness:  There is no abdominal tenderness  There is no guarding  Musculoskeletal:      Cervical back: Neck supple  Right lower leg: No edema  Left lower leg: No edema  Skin:     Coloration: Skin is not jaundiced or pale  Neurological:      General: No focal deficit present  Mental Status: He is alert  Cranial Nerves: No cranial nerve deficit  Psychiatric:         Behavior: Behavior normal          Diagnostic Data:    Previous  labs were reviewed  Labs done on 04/02/2029 revealed BUN 6, creatinine 0 64  Sodium 138, K 3 8, HGB 12 6  WBC 4 8,   Lipid profile on 12/20/2020 revealed cholesterol 144, triglycerides 77, HDL 70, LDL 59  TSH nl    Additional Notes:      This note was electronically signed by Dr Denton Lanes

## 2021-09-21 ENCOUNTER — NURSING HOME VISIT (OUTPATIENT)
Dept: GERIATRICS | Facility: OTHER | Age: 71
End: 2021-09-21
Payer: MEDICARE

## 2021-09-21 DIAGNOSIS — J96.10 CHRONIC RESPIRATORY FAILURE, UNSPECIFIED WHETHER WITH HYPOXIA OR HYPERCAPNIA (HCC): ICD-10-CM

## 2021-09-21 DIAGNOSIS — J43.9 PULMONARY EMPHYSEMA, UNSPECIFIED EMPHYSEMA TYPE (HCC): Primary | ICD-10-CM

## 2021-09-21 DIAGNOSIS — E55.9 VITAMIN D DEFICIENCY: ICD-10-CM

## 2021-09-21 DIAGNOSIS — F41.9 ANXIETY: ICD-10-CM

## 2021-09-21 DIAGNOSIS — K21.9 GASTROESOPHAGEAL REFLUX DISEASE WITHOUT ESOPHAGITIS: ICD-10-CM

## 2021-09-21 DIAGNOSIS — F31.9 BIPOLAR AFFECTIVE DISORDER, REMISSION STATUS UNSPECIFIED (HCC): ICD-10-CM

## 2021-09-21 PROCEDURE — 99309 SBSQ NF CARE MODERATE MDM 30: CPT | Performed by: INTERNAL MEDICINE

## 2021-09-21 NOTE — ASSESSMENT & PLAN NOTE
History of end-stage pulmonary emphysema  Patient is on 3 L of nasal cannula at rest   Patient states that he felt out of breath after mild exertion  Continue DuoNeb nebulization, prednisone and Breo  Goal of care discussion initiated with patient today  Patient expressed the wish of do not intubation/do not resuscitation  Patient is fully competent    Change code status to level 3

## 2021-09-21 NOTE — PROGRESS NOTES
[address]  [address]   [address] [facility] [address]14 [facility] [address]  [address]     Progress Note  Code LT 32    Patient Location     [address] [place] [address] rehab    Reason for visit     Medical follow-up of COPD, chronic respiratory failure with hypoxia and GRED    Patients care was coordinated with nursing facility staff  Recent vitals, labs and updated medications were reviewed on [facility] system of facility  Problem List Items Addressed This Visit        Digestive    Gastroesophageal reflux disease without esophagitis     Continue omeprazole            Respiratory    COPD (chronic obstructive pulmonary disease) (HCC) - Primary     History of end-stage pulmonary emphysema  Patient is on 3 L of nasal cannula at rest   Patient states that he felt out of breath after mild exertion  Continue DuoNeb nebulization, prednisone and Breo  Goal of care discussion initiated with patient today  Patient expressed the wish of do not intubation/do not resuscitation  Patient is fully competent  Change code status to level 3         Chronic respiratory failure (HCC)     Patient is currently on 3 L of nasal cannula breathing through mouth  Treatment see COPD            Other    Anxiety     Continue Paxil and BuSpar          Bipolar disorder (HCC)     Continue Paxil, BuSpar and lamotrigine         Vitamin D deficiency              HPI       Pain is been seeing for follow-up visit  Patient complained of dyspnea on exertion  Patient is currently on 3 L of nasal cannula  Patient denies shortness breath at rest   Patient denies chest pain, fever, chills, abdominal pain  No dysuria or bowel movement changes  Patient has wheezing bilaterally on examination  In view of end-stage of pulmonary emphysema  Call care discussion was initiated with patient  Patient expressed the wish of do not resuscitation/do not intubation  Will change code status to level 3  Patient is fully competent    Will continue current medications  Will continue follow-up  Review of Systems   Constitutional: Negative for chills and fever  HENT: Negative for congestion, rhinorrhea, sneezing and sore throat  Eyes: Negative for pain and discharge  Respiratory: Positive for cough and shortness of breath (On exertion)  Negative for chest tightness and wheezing  Cardiovascular: Negative for chest pain and leg swelling  Gastrointestinal: Negative for abdominal pain, nausea and vomiting  Endocrine: Negative for polydipsia, polyphagia and polyuria  Genitourinary: Negative for flank pain, frequency and urgency  Musculoskeletal: Negative for arthralgias, back pain and joint swelling  Skin: Negative for color change and pallor  Neurological: Negative for dizziness, weakness, light-headedness and headaches  Psychiatric/Behavioral: Negative for agitation and confusion  Past Medical History:   Diagnosis Date    Acute and chronic [facility] with [facility]     Asthma     COPD (chronic obstructive pulmonary disease) [facility]     Delirium     Hypokalemia     Hyponatremia        No past surgical history on file      Social History     Tobacco Use   Smoking Status Former Smoker    Packs/day: 2 00    Types: Cigarettes    Quit date: 2018    Years since quitting: 3 7   Smokeless Tobacco Former User    Types: Chew       Family History   Problem Relation Age of Onset    Brain cancer Mother     Heart disease Father         Allergies   Allergen Reactions    Penicillins          Current Outpatient Medications:     acetaminophen (TYLENOL) 325 mg tablet, Take 650 mg by mouth every 6 (six) hours as needed, Disp: , Rfl:     albuterol (2 5 mg/3 mL) 0 083 % nebulizer solution, Take 2 5 mg by nebulization every 6 (six) hours as needed, Disp: , Rfl:     benzonatate (TESSALON PERLES) 100 mg capsule, Take 100 mg by mouth 3 (three) times a day as needed, Disp: , Rfl:     busPIRone (BUSPAR) 10 mg tablet, Take 10 mg by mouth 2 (two) times a day, Disp: , Rfl:     calcium carbonate (TUMS) 500 mg chewable tablet, Chew 1 tablet every 6 (six) hours as needed, Disp: , Rfl:     ergocalciferol (ERGOCALCIFEROL) 1 25 MG (81620 UT) capsule, Take 50,000 Units by mouth once a week, Disp: , Rfl:     fluticasone-vilanterol (BREO ELLIPTA) 100-25 mcg/inh inhaler, Inhale 1 puff daily Rinse mouth after use , Disp: , Rfl:     guaiFENesin (MUCINEX) 600 mg 12 hr tablet, Take 1,200 mg by mouth every 12 (twelve) hours, Disp: , Rfl:     ipratropium-albuterol (DUO-NEB) 0 5-2 5 mg/3 mL nebulizer solution, Take 3 mL by nebulization every 6 (six) hours, Disp: , Rfl:     lamoTRIgine (LaMICtal) 25 mg tablet, Take 50 mg by mouth daily, Disp: , Rfl:     magnesium hydroxide (MILK OF MAGNESIA) 400 mg/5 mL oral suspension, Take by mouth daily as needed for constipation, Disp: , Rfl:     Morphine Sulfate, Concentrate, 20 mg/mL concentrated solution, Take 0 25 mL (5 mg total) by mouth every 6 (six) hours as needed for severe painMax Daily Amount: 20 mg, Disp: 60 mL, Rfl: 0    omeprazole (PriLOSEC) 20 mg delayed release capsule, Take 20 mg by mouth 2 (two) times a day, Disp: , Rfl:     PARoxetine (PAXIL) 20 mg tablet, Take 20 mg by mouth daily, Disp: , Rfl:     predniSONE 5 mg tablet, Take 5 mg by mouth daily, Disp: , Rfl:     sodium chloride (OCEAN) 0 65 % nasal spray, 1 spray into each nostril every 12 (twelve) hours as needed, Disp: , Rfl:     Updated list was reviewed in [facility] system of facility  /71, temperature 97 8° F, pulse 70, respirations 18, SpO2 98%    Physical Exam  HENT:      Head: Normocephalic  Eyes:      Pupils: Pupils are equal, round, and reactive to light  Cardiovascular:      Rate and Rhythm: Normal rate and regular rhythm  Heart sounds: No murmur heard  Pulmonary:      Effort: Pulmonary effort is normal  No respiratory distress  Breath sounds: Wheezing present  No rales  Comments:  On 3 L of nasal cannula  Abdominal:      Palpations: Abdomen is soft  Tenderness: There is no abdominal tenderness  Musculoskeletal:         General: No swelling  Normal range of motion  Cervical back: Normal range of motion  Skin:     General: Skin is warm  Neurological:      Mental Status: He is alert and oriented to person, place, and time  Psychiatric:         Mood and Affect: Mood normal          Diagnostic Data:    Recent labs were reviewed  Additional Notes:      This note was electronically signed by Dr Helio Balbuena

## 2021-10-29 ENCOUNTER — NURSING HOME VISIT (OUTPATIENT)
Dept: GERIATRICS | Facility: OTHER | Age: 71
End: 2021-10-29
Payer: MEDICARE

## 2021-10-29 ENCOUNTER — NURSING HOME VISIT (OUTPATIENT)
Dept: FAMILY MEDICINE CLINIC | Facility: CLINIC | Age: 71
End: 2021-10-29

## 2021-10-29 VITALS
BODY MASS INDEX: 20.48 KG/M2 | HEART RATE: 72 BPM | DIASTOLIC BLOOD PRESSURE: 64 MMHG | SYSTOLIC BLOOD PRESSURE: 120 MMHG | RESPIRATION RATE: 16 BRPM | WEIGHT: 151 LBS | TEMPERATURE: 97.2 F | OXYGEN SATURATION: 96 %

## 2021-10-29 DIAGNOSIS — K21.9 GASTROESOPHAGEAL REFLUX DISEASE WITHOUT ESOPHAGITIS: ICD-10-CM

## 2021-10-29 DIAGNOSIS — F32.9 MAJOR DEPRESSIVE DISORDER WITH CURRENT ACTIVE EPISODE, UNSPECIFIED DEPRESSION EPISODE SEVERITY, UNSPECIFIED WHETHER RECURRENT: ICD-10-CM

## 2021-10-29 DIAGNOSIS — J43.9 PULMONARY EMPHYSEMA, UNSPECIFIED EMPHYSEMA TYPE (HCC): Primary | ICD-10-CM

## 2021-10-29 DIAGNOSIS — J96.10 CHRONIC RESPIRATORY FAILURE, UNSPECIFIED WHETHER WITH HYPOXIA OR HYPERCAPNIA (HCC): ICD-10-CM

## 2021-10-29 DIAGNOSIS — Z87.891 PERSONAL HISTORY OF TOBACCO USE: ICD-10-CM

## 2021-10-29 PROBLEM — F32.A DEPRESSION: Status: ACTIVE | Noted: 2021-10-29

## 2021-10-29 PROCEDURE — 99305 1ST NF CARE MODERATE MDM 35: CPT | Performed by: INTERNAL MEDICINE

## 2021-11-27 ENCOUNTER — TELEPHONE (OUTPATIENT)
Dept: OTHER | Facility: OTHER | Age: 71
End: 2021-11-27

## 2021-12-03 ENCOUNTER — NURSING HOME VISIT (OUTPATIENT)
Dept: GERIATRICS | Facility: OTHER | Age: 71
End: 2021-12-03
Payer: MEDICARE

## 2021-12-03 VITALS
OXYGEN SATURATION: 97 % | SYSTOLIC BLOOD PRESSURE: 121 MMHG | HEART RATE: 75 BPM | TEMPERATURE: 97.1 F | RESPIRATION RATE: 18 BRPM | DIASTOLIC BLOOD PRESSURE: 69 MMHG

## 2021-12-03 DIAGNOSIS — F31.9 BIPOLAR AFFECTIVE DISORDER, REMISSION STATUS UNSPECIFIED (HCC): ICD-10-CM

## 2021-12-03 DIAGNOSIS — J96.10 CHRONIC RESPIRATORY FAILURE, UNSPECIFIED WHETHER WITH HYPOXIA OR HYPERCAPNIA (HCC): ICD-10-CM

## 2021-12-03 DIAGNOSIS — K21.9 GASTROESOPHAGEAL REFLUX DISEASE WITHOUT ESOPHAGITIS: ICD-10-CM

## 2021-12-03 DIAGNOSIS — F32.9 MAJOR DEPRESSIVE DISORDER WITH CURRENT ACTIVE EPISODE, UNSPECIFIED DEPRESSION EPISODE SEVERITY, UNSPECIFIED WHETHER RECURRENT: ICD-10-CM

## 2021-12-03 DIAGNOSIS — E55.9 VITAMIN D DEFICIENCY: ICD-10-CM

## 2021-12-03 DIAGNOSIS — F41.9 ANXIETY: ICD-10-CM

## 2021-12-03 DIAGNOSIS — J43.9 PULMONARY EMPHYSEMA, UNSPECIFIED EMPHYSEMA TYPE (HCC): Primary | ICD-10-CM

## 2021-12-03 PROCEDURE — 99309 SBSQ NF CARE MODERATE MDM 30: CPT | Performed by: NURSE PRACTITIONER

## 2021-12-08 PROBLEM — Z12.2 ENCOUNTER FOR SCREENING FOR MALIGNANT NEOPLASM OF LUNG IN FORMER SMOKER WHO QUIT IN PAST 15 YEARS WITH 30 PACK YEAR HISTORY OR GREATER: Status: ACTIVE | Noted: 2021-12-08

## 2021-12-08 PROBLEM — Z87.891 ENCOUNTER FOR SCREENING FOR MALIGNANT NEOPLASM OF LUNG IN FORMER SMOKER WHO QUIT IN PAST 15 YEARS WITH 30 PACK YEAR HISTORY OR GREATER: Status: ACTIVE | Noted: 2021-12-08

## 2021-12-09 ENCOUNTER — NURSING HOME VISIT (OUTPATIENT)
Dept: PULMONOLOGY | Facility: SKILLED NURSING FACILITY | Age: 71
End: 2021-12-09
Payer: MEDICARE

## 2021-12-09 DIAGNOSIS — J43.9 PULMONARY EMPHYSEMA, UNSPECIFIED EMPHYSEMA TYPE (HCC): Primary | ICD-10-CM

## 2021-12-09 DIAGNOSIS — K21.9 GASTROESOPHAGEAL REFLUX DISEASE WITHOUT ESOPHAGITIS: ICD-10-CM

## 2021-12-09 DIAGNOSIS — Z87.891 ENCOUNTER FOR SCREENING FOR MALIGNANT NEOPLASM OF LUNG IN FORMER SMOKER WHO QUIT IN PAST 15 YEARS WITH 30 PACK YEAR HISTORY OR GREATER: ICD-10-CM

## 2021-12-09 DIAGNOSIS — Z12.2 ENCOUNTER FOR SCREENING FOR MALIGNANT NEOPLASM OF LUNG IN FORMER SMOKER WHO QUIT IN PAST 15 YEARS WITH 30 PACK YEAR HISTORY OR GREATER: ICD-10-CM

## 2021-12-09 DIAGNOSIS — E46 PROTEIN-CALORIE MALNUTRITION, UNSPECIFIED SEVERITY (HCC): ICD-10-CM

## 2021-12-09 DIAGNOSIS — J96.10 CHRONIC RESPIRATORY FAILURE, UNSPECIFIED WHETHER WITH HYPOXIA OR HYPERCAPNIA (HCC): ICD-10-CM

## 2021-12-09 DIAGNOSIS — F32.9 MAJOR DEPRESSIVE DISORDER WITH CURRENT ACTIVE EPISODE, UNSPECIFIED DEPRESSION EPISODE SEVERITY, UNSPECIFIED WHETHER RECURRENT: ICD-10-CM

## 2021-12-09 DIAGNOSIS — F31.9 BIPOLAR AFFECTIVE DISORDER, REMISSION STATUS UNSPECIFIED (HCC): ICD-10-CM

## 2021-12-09 DIAGNOSIS — Z87.891 PERSONAL HISTORY OF TOBACCO USE: ICD-10-CM

## 2021-12-09 PROCEDURE — 99306 1ST NF CARE HIGH MDM 50: CPT | Performed by: INTERNAL MEDICINE

## 2022-01-31 ENCOUNTER — NURSING HOME VISIT (OUTPATIENT)
Dept: GERIATRICS | Facility: OTHER | Age: 72
End: 2022-01-31
Payer: MEDICARE

## 2022-01-31 VITALS
RESPIRATION RATE: 18 BRPM | WEIGHT: 155.4 LBS | SYSTOLIC BLOOD PRESSURE: 127 MMHG | HEART RATE: 78 BPM | BODY MASS INDEX: 21.08 KG/M2 | TEMPERATURE: 97.5 F | DIASTOLIC BLOOD PRESSURE: 74 MMHG | OXYGEN SATURATION: 98 %

## 2022-01-31 DIAGNOSIS — J43.9 PULMONARY EMPHYSEMA, UNSPECIFIED EMPHYSEMA TYPE (HCC): Primary | ICD-10-CM

## 2022-01-31 DIAGNOSIS — F41.9 ANXIETY: ICD-10-CM

## 2022-01-31 PROCEDURE — 99309 SBSQ NF CARE MODERATE MDM 30: CPT | Performed by: INTERNAL MEDICINE

## 2022-01-31 NOTE — ASSESSMENT & PLAN NOTE
No bronchospasm at present  Patient reports getting short of breath with minimal exertion  Continue oxygen and maintenance inhalers  Patient is requesting to have albuterol at bedside  Discussed with nursing staff  Patient apparently had issues with overuse of medication in the past and therefore inhaler was taken away    Patient additionally remains on prednisone 5 mg daily

## 2022-01-31 NOTE — PROGRESS NOTES
[address]  [address]   [address] [facility] [address] 32[address] [facility] [address]  49     Progress Note                          Code [facility] 32  Patient Location     [facility]    Reason for visit     F U COPD, Anxiety,    Patients care was coordinated with nursing facility staff  Recent vitals, labs and updated medications [facility]  Problem List Items Addressed This Visit        Respiratory    COPD (chronic obstructive pulmonary disease) ([facility] [address] ) - Primary     No bronchospasm at present  Patient reports getting short of breath with minimal exertion  Continue oxygen and maintenance inhalers  Patient is requesting to have albuterol at bedside  Discussed with nursing staff  Patient apparently had issues with overuse of medication in the past and therefore inhaler was taken away  Patient additionally remains on prednisone 5 mg daily         Relevant Medications    fluticasone-umeclidinium-vilanterol (TRELEGY) 100-62 5-25 MCG/INH inhaler       Other    Anxiety     Patient has periods of increased anxiety  Continue Paxil 10 mg daily and buspirone 10 mg b i d  GERD:  Stable on omeprazole    Protein calorie malnutrition:  Patient is noted to have mild cachexia with temporal muscle wasting and loss of subcutaneous tissue  Continue in courage p o  intake    Chronic respiratory failure:  Continue home oxygen, nebulizer treatments and Trelegy inhaler    History of tobacco abuse:  Patient quit smoking approximately 2 years ago  CT chest was recommended to screen for malignancy however patient refused to go to Radiology appointment this month  Depression:  Continue Paxil    Bipolar disorder:  Patient remains on lamotrigine and Paxil    HPI       Patient is being seen for a follow-up visit today  Patient has chronic dyspnea, tends to get short of breath with minimal exertion  Patient remains on chronic oxygen    Patient spends all the time in his room  He was advice to go out in [place] and engage in other activities on last encounter however patient states minimal activities make him short of breath and therefore he prefers to stay in the room  There have been no reports of any fever chills nausea vomiting diarrhea or abdominal      Review of Systems   Constitutional: Negative for chills and fever  HENT: Negative for nosebleeds and rhinorrhea  Eyes: Negative for discharge and redness  Respiratory: Positive for shortness of breath and wheezing (intermittent)  Negative for cough, chest tightness and stridor  Cardiovascular: Negative for chest pain and leg swelling  Gastrointestinal: Negative for abdominal distention, abdominal pain, diarrhea and vomiting  Genitourinary: Negative for dysuria, flank pain and hematuria  Musculoskeletal: Negative for arthralgias and back pain  Skin: Negative for pallor  Neurological: Negative for tremors, seizures, syncope, weakness (Generalized) and headaches  Psychiatric/Behavioral: Negative for agitation, behavioral problems and confusion         Past Medical History:   Diagnosis Date    Acute and chronic respiratory failure with hypoxia (HCC)     Asthma     COPD (chronic obstructive pulmonary disease) (HCC)     Delirium     Hypokalemia     Hyponatremia          Social History     Tobacco Use   Smoking Status Former Smoker    Packs/day: 2 00    Types: Cigarettes    Quit date:     Years since quittin 0   Smokeless Tobacco Former User    Types: Chew       Family History   Problem Relation Age of Onset    Brain cancer Mother     Heart disease Father         Allergies   Allergen Reactions    Penicillins          Current Outpatient Medications:     fluticasone-umeclidinium-vilanterol (TRELEGY) 100-62 5-25 MCG/INH inhaler, Inhale 1 puff daily Rinse mouth after use , Disp: , Rfl:     acetaminophen (TYLENOL) 325 mg tablet, Take 650 mg by mouth every 6 (six) hours as needed, Disp: , Rfl:     albuterol (2 5 mg/3 mL) 0 083 % nebulizer solution, Take 2 5 mg by nebulization every 6 (six) hours as needed, Disp: , Rfl:     benzonatate (TESSALON PERLES) 100 mg capsule, Take 100 mg by mouth 3 (three) times a day as needed, Disp: , Rfl:     busPIRone (BUSPAR) 10 mg tablet, Take 10 mg by mouth 2 (two) times a day, Disp: , Rfl:     calcium carbonate (TUMS) 500 mg chewable tablet, Chew 1 tablet every 6 (six) hours as needed, Disp: , Rfl:     ergocalciferol (ERGOCALCIFEROL) 1 25 MG (29120 UT) capsule, Take 50,000 Units by mouth once a week, Disp: , Rfl:     guaiFENesin (MUCINEX) 600 mg 12 hr tablet, Take 1,200 mg by mouth every 12 (twelve) hours, Disp: , Rfl:     ipratropium-albuterol (DUO-NEB) 0 5-2 5 mg/3 mL nebulizer solution, Take 3 mL by nebulization every 6 (six) hours, Disp: , Rfl:     lamoTRIgine (LaMICtal) 25 mg tablet, Take 50 mg by mouth daily, Disp: , Rfl:     magnesium hydroxide (MILK OF MAGNESIA) 400 mg/5 mL oral suspension, Take by mouth daily as needed for constipation, Disp: , Rfl:     Morphine Sulfate, Concentrate, 20 mg/mL concentrated solution, Take 0 25 mL (5 mg total) by mouth every 6 (six) hours as needed for severe painMax Daily Amount: 20 mg, Disp: 60 mL, Rfl: 0    omeprazole (PriLOSEC) 20 mg delayed release capsule, Take 20 mg by mouth 2 (two) times a day, Disp: , Rfl:     PARoxetine (PAXIL) 20 mg tablet, Take 20 mg by mouth daily, Disp: , Rfl:     predniSONE 5 mg tablet, Take 5 mg by mouth daily, Disp: , Rfl:     sodium chloride (OCEAN) 0 65 % nasal spray, 1 spray into each nostril every 12 (twelve) hours as needed, Disp: , Rfl:     Updated list was reviewed in [facility], Susan. \[facility] and [facility].\"" system of facility  Vitals:    01/31/22 1318   BP: 127/74   Pulse: 78   Resp: 18   Temp: 97 5 °F (36 4 °C)   SpO2: 98%       Physical Exam  HENT:      Head: Normocephalic and atraumatic  Nose: No rhinorrhea  Eyes:      General: No scleral icterus          Right eye: No discharge  Left eye: No discharge  Cardiovascular:      Rate and Rhythm: Normal rate and regular rhythm  Pulmonary:      Effort: No respiratory distress  Breath sounds: No wheezing or rales  Comments: Decreased breath sounds bilaterally  Abdominal:      General: There is no distension  Palpations: Abdomen is soft  Tenderness: There is no abdominal tenderness  There is no guarding  Musculoskeletal:      Cervical back: Neck supple  Right lower leg: No edema  Left lower leg: No edema  Skin:     Coloration: Skin is not jaundiced  Neurological:      General: No focal deficit present  Cranial Nerves: No cranial nerve deficit  Comments: Oriented in month and year   Psychiatric:         Mood and Affect: Mood normal       Comments: Anxious         Diagnostic Data:    Recent labs were reviewed    Labs done on 12/28/2021 revealed BUN of 9, creatinine 0 75, sodium 142, potassium 3 5          This note was electronically signed by Dr Nick Dan

## 2022-02-24 ENCOUNTER — NURSING HOME VISIT (OUTPATIENT)
Dept: GERIATRICS | Facility: OTHER | Age: 72
End: 2022-02-24
Payer: MEDICARE

## 2022-02-24 VITALS
OXYGEN SATURATION: 98 % | RESPIRATION RATE: 16 BRPM | SYSTOLIC BLOOD PRESSURE: 101 MMHG | TEMPERATURE: 97.8 F | HEART RATE: 62 BPM | DIASTOLIC BLOOD PRESSURE: 60 MMHG

## 2022-02-24 DIAGNOSIS — E46 PROTEIN-CALORIE MALNUTRITION, UNSPECIFIED SEVERITY (HCC): ICD-10-CM

## 2022-02-24 DIAGNOSIS — F41.9 ANXIETY: ICD-10-CM

## 2022-02-24 DIAGNOSIS — F32.9 MAJOR DEPRESSIVE DISORDER WITH CURRENT ACTIVE EPISODE, UNSPECIFIED DEPRESSION EPISODE SEVERITY, UNSPECIFIED WHETHER RECURRENT: ICD-10-CM

## 2022-02-24 DIAGNOSIS — F31.9 BIPOLAR AFFECTIVE DISORDER, REMISSION STATUS UNSPECIFIED (HCC): ICD-10-CM

## 2022-02-24 DIAGNOSIS — E55.9 VITAMIN D DEFICIENCY: ICD-10-CM

## 2022-02-24 DIAGNOSIS — K21.9 GASTROESOPHAGEAL REFLUX DISEASE WITHOUT ESOPHAGITIS: ICD-10-CM

## 2022-02-24 DIAGNOSIS — J43.9 PULMONARY EMPHYSEMA, UNSPECIFIED EMPHYSEMA TYPE (HCC): Primary | ICD-10-CM

## 2022-02-24 DIAGNOSIS — Z87.891 PERSONAL HISTORY OF TOBACCO USE: ICD-10-CM

## 2022-02-24 PROCEDURE — 99309 SBSQ NF CARE MODERATE MDM 30: CPT

## 2022-02-24 NOTE — PROGRESS NOTES
[facility]  Jen Chun [address]  [phone]  [facility]  Code 28 ([facility])        NAME: Alexis Calero  AGE: 66 y o  SEX: male CODE STATUS: DNR    DATE OF ENCOUNTER: 2/24/2022    Assessment and Plan     1  Pulmonary emphysema, unspecified emphysema type ([address] )  Assessment & Plan:  · Patient has chronic history of COPD  · Notes increased shortness of breath over the past few months   · Noted to be on 3 L of oxygen on exam   · Patient notes he has had to titrate himself up from 2 L to 3 L  · Oxygen saturation on exam noted to be 100%   · Patient not in any acute respiratory distress   · Lungs are clear on auscultation   · Patient requesting rescue inhaler be placed at the bedside   · Staff notes that last time he was given the inhaler he overused it   · Patient notes that he is not using the rescue inhaler because he does not want to wait for staff to bring it in  · Educated patient on why inhaler cannot be left at the bedside   · Patient notes that he has seen the facility pulmonologist in the past but not recently   · Eula Moreno the respiratory therapist updated on patients concerns regarding his breathing  · Per Eula Moreno, patient has been offered a full workup by the pulmonologist including PFT's and a lung cancer screening but notes that the patient did not want to go to the pulmonologist's office to have the testing done  · Will add patient to the pulmonologist's list for next week to evaluate  · Continue current medication regimen   · Prednisone 5 mg daily   · Ipratropium-albuterol five times per day and Q 6 hours prn   · Trelegy daily   · Albuterol-sulfate Q 6 hours prn   · Encourage patient to get out of bed and be more ambulatory   · Will continue to monitor      2   Personal history of tobacco use  Assessment & Plan:  · Patient noted to have quit smoking approximately 2 years ago  · Pulmonology recommended lung cancer screening   · Patient agreed so CT of the chest was ordered  · Patient refused to go out to have CT scan completed  · Patient notes worsening shortness of breath  · Pulmonology will follow-up with patient next week      3  Bipolar affective disorder, remission status unspecified ([place] [address] )  Assessment & Plan:  · Patient notes that he has a history of bipolar type 2  · He appears to be in remission   · Encourage patient to participate in group activities and ambulate during the day   · Continue current medication regimen   · Lamictal 25 mg daily   · Continue follow-up with Dr Maggy Torres [facility] psychiatrist      4  Anxiety  Assessment & Plan:  · Patient noted to have periods of anxiety   · Patient denies that this is contributing to his shortness of breath   · Continue current medication regimen   · Buspirone 10 mg BID  · Patient being followed by Dr Maggy Torres [facility] psychiatrist   · Continue supportive care  · Continue to monitor      5  Major depressive disorder with current active episode, unspecified depression episode severity, unspecified whether recurrent  Assessment & Plan:  · Mood overall appears stable  · Patient notes that he is frustrated with his breathing and that this makes him feel depressed at times  · Denies SI/HI on exam   · Continue current medication regimen   · Paxil 10 mg daily   · Encourage socialization in the halls and activity room  · Patient being followed by Dr Maggy Torres [facility] psychiatrist   · Continue supportive care      6  Gastroesophageal reflux disease without esophagitis  Assessment & Plan:  · Stable  · Continue current medication regimen  · Omeprazole 20 mg daily   · Calcium carbonate 500 mg Q 6 hours prn      7   Protein-calorie malnutrition, unspecified severity ([place] [address] )  Assessment & Plan:  Malnutrition Findings:   · Mild cachexia   · Temporal muscle wasting   · Loss of subcutaneous tissue     BMI Findings:  · Encourage oral intake   · Encourage patient to get out of bed and ambulate   · Facility dietician following   · Continue nutritional supplements  · [employer]       8  Vitamin D deficiency  Assessment & Plan:  · Most recent vitamin D level on 12/21 noted to be low at 21  · Continue current medication regimen   · Ergocalciferol 01890 units weekly  · Will recheck vitamin D level on next set of labs         All medications and routine orders were reviewed and updated as needed  Chief Complaint     LTC follow-up visit  History of Present Illness     Ashish Ayala is a 66year old male who is a long term care resident of [facility]  His past medical history includes but is not limited to GERD, COPD, anxiety, bipolar, depression, and protein calorie malnutrition  He is being seen and evaluated today in collaboration with nursing for medical management and LTC follow-up  The patient was seen and evaluated today at the bedside  The patient is lying in bed sleeping on my entry into the room  He is alert and oriented x 4 and is able to make his needs known  He notes that he is feeling okay today  He states that he has been having increased difficulties with his breathing  He notes that this has been occurring over the past few months  He states that he feels short of breath when talking and exerting himself  He notes that he has not been ambulating due to his breathing  He states that he needs to take his nebulizer after he ambulates  Per Frederick Fent the respiratory therapist, the patient does not take the full treatment at the scheduled time  He is noted to leave some medication in the nebulizer which is located at the bedside and then will self-administer the medication after returning from walking to the bathroom  When talking or exerting himself he states, "I feel like i'm suffocating " At the time of exam, he is noted to be on 3 L of oxygen with an oxygen saturation of 100%  His oxygen saturation was monitored for at least 5 minutes while the patient was talking and it was noted to be 100% the entire time   He was not noted to be in any respiratory distress during any part of the exam  He is noted to have anxiety but denies that this is contributing to his shortness of breath  He notes that he is upset and frustrated with staff because they will not allow him to have the rescue inhaler at the bedside  He states "If I am having an exacerbation, I cant wait for 20 minutes for them to come in here and give it to me " Per facility staff, this was trialed but the patient was noted to use the entire inhaler in approximately one week  The patient notes that he does not ask for the inhaler because he does not think he will receive the medication fast enough  The patient states that he has seen the facility pulmonologist in the past  I discussed all of this with Markos Carcamo the respiratory therapist who knows the patient well  She stated that the pulmonologist last evaluated the patient in October 2021  She notes that at that time, the patient was offered a full respiratory workup including PFT's and a lung cancer screening  He initially agreed but then did not want to see the pulmonologist in the office to get the testing  Markos Carcamo was informed of the patients concerns and notes that she will add the patient to the pulmonologist's list for next week  The patient is aware of this and is thankful  The patient denies dizziness, light-headedness, headaches, or vision changes  He is currently denying pain on exam  He notes no chest pain or cough  He also denies nausea, vomiting, constipation, and diarrhea  He notes no issues with his bowels or bladder  Per the SNF records, his last bowel movement was noted to be on 2/23  The patient notes that he has a good appetite and that he is trying to stay hydrated  Per the SNF records, he is eating 3 meals per day, consuming % of each meal  The patient notes that he does not get out of bed at all during the day unless he needs to have a bowel movement due to his breathing  He is noted to use the urinals when voiding  There are no concerns from nursing at this time  The patient's allergies, past medical, surgical, social and family history were reviewed and unchanged  Review of Systems     Review of Systems   Constitutional: Positive for activity change  Negative for appetite change, chills, fatigue and fever  HENT: Negative for congestion, rhinorrhea and sore throat  Respiratory: Positive for shortness of breath  Negative for cough and chest tightness  Cardiovascular: Negative for chest pain and palpitations  Gastrointestinal: Negative for abdominal distention, abdominal pain, constipation, diarrhea, nausea and vomiting  Genitourinary: Negative for difficulty urinating, dysuria, frequency and urgency  Musculoskeletal: Positive for gait problem  Negative for arthralgias and myalgias  Skin: Negative for color change and pallor  Neurological: Positive for weakness  Negative for dizziness, light-headedness, numbness and headaches  Psychiatric/Behavioral: Negative for behavioral problems, confusion and sleep disturbance  The patient is nervous/anxious (at times)  Objective     Vitals: Per SNF records     Vitals:    02/23/22 1823   BP: 101/60   Pulse: 62   Resp: 16   Temp: 97 8 °F (36 6 °C)   SpO2: 98%         Physical Exam  Vitals and nursing note reviewed  Constitutional:       General: He is not in acute distress  Appearance: Normal appearance  He is normal weight  He is not ill-appearing  HENT:      Head: Normocephalic  Nose: No congestion or rhinorrhea  Mouth/Throat:      Mouth: Mucous membranes are dry  Cardiovascular:      Rate and Rhythm: Normal rate and regular rhythm  Pulses: Normal pulses  Heart sounds: Normal heart sounds  No murmur heard  Pulmonary:      Effort: Pulmonary effort is normal  No respiratory distress  Breath sounds: Normal breath sounds  No wheezing, rhonchi or rales        Comments: Patient is able to take deep breaths on exam Oxygen saturation noted to be 100% on 3 L of oxygen  Abdominal:      General: Bowel sounds are normal  There is no distension  Palpations: Abdomen is soft  Tenderness: There is no abdominal tenderness  Musculoskeletal:         General: No swelling, tenderness or signs of injury  Skin:     General: Skin is warm and dry  Coloration: Skin is not pale  Findings: No bruising or erythema  Neurological:      General: No focal deficit present  Mental Status: He is alert and oriented to person, place, and time  Mental status is at baseline  Motor: Weakness (generalized) present  Gait: Gait abnormal    Psychiatric:         Mood and Affect: Mood is anxious (at times)  Speech: Speech normal          Behavior: Behavior is cooperative  Thought Content: Thought content does not include homicidal or suicidal ideation  Pertinent Laboratory/Diagnostic Studies:   Reviewed in facility chart-stable    Current Medications   Medications reviewed and updated see facility [facility] for details        Current Outpatient Medications:     acetaminophen (TYLENOL) 325 mg tablet, Take 650 mg by mouth every 6 (six) hours as needed, Disp: , Rfl:     albuterol (2 5 mg/3 mL) 0 083 % nebulizer solution, Take 2 5 mg by nebulization every 6 (six) hours as needed, Disp: , Rfl:     benzonatate (TESSALON PERLES) 100 mg capsule, Take 100 mg by mouth 3 (three) times a day as needed, Disp: , Rfl:     busPIRone (BUSPAR) 10 mg tablet, Take 10 mg by mouth 2 (two) times a day, Disp: , Rfl:     calcium carbonate (TUMS) 500 mg chewable tablet, Chew 1 tablet every 6 (six) hours as needed, Disp: , Rfl:     ergocalciferol (ERGOCALCIFEROL) 1 25 MG (92604 UT) capsule, Take 50,000 Units by mouth once a week, Disp: , Rfl:     fluticasone-umeclidinium-vilanterol (TRELEGY) 100-62 5-25 MCG/INH inhaler, Inhale 1 puff daily Rinse mouth after use , Disp: , Rfl:     guaiFENesin (MUCINEX) 600 mg 12 hr tablet, Take 1,200 mg by mouth every 12 (twelve) hours, Disp: , Rfl:     ipratropium-albuterol (DUO-NEB) 0 5-2 5 mg/3 mL nebulizer solution, Take 3 mL by nebulization every 6 (six) hours, Disp: , Rfl:     lamoTRIgine (LaMICtal) 25 mg tablet, Take 50 mg by mouth daily, Disp: , Rfl:     magnesium hydroxide (MILK OF MAGNESIA) 400 mg/5 mL oral suspension, Take by mouth daily as needed for constipation, Disp: , Rfl:     Morphine Sulfate, Concentrate, 20 mg/mL concentrated solution, Take 0 25 mL (5 mg total) by mouth every 6 (six) hours as needed for severe painMax Daily Amount: 20 mg, Disp: 60 mL, Rfl: 0    omeprazole (PriLOSEC) 20 mg delayed release capsule, Take 20 mg by mouth 2 (two) times a day, Disp: , Rfl:     PARoxetine (PAXIL) 20 mg tablet, Take 20 mg by mouth daily, Disp: , Rfl:     predniSONE 5 mg tablet, Take 5 mg by mouth daily, Disp: , Rfl:     sodium chloride (OCEAN) 0 65 % nasal spray, 1 spray into each nostril every 12 (twelve) hours as needed, Disp: , Rfl:      Plan discussed with Dr Shannan West noted agreement with assessment and plan  Please note:  Voice-recognition software may have been used in the preparation of this document  Occasional wrong word or "sound-alike" substitutions may have occurred due to the inherent limitations of voice recognition software  Interpretation should be guided by STEVIE Keys  2/26/2022  2:35 PM

## 2022-02-26 NOTE — ASSESSMENT & PLAN NOTE
· Patient noted to have quit smoking approximately 2 years ago  · Pulmonology recommended lung cancer screening   · Patient agreed so CT of the chest was ordered  · Patient refused to go out to have CT scan completed  · Patient notes worsening shortness of breath  · Pulmonology will follow-up with patient next week

## 2022-02-26 NOTE — ASSESSMENT & PLAN NOTE
· Patient has chronic history of COPD  · Notes increased shortness of breath over the past few months   · Noted to be on 3 L of oxygen on exam   · Patient notes he has had to titrate himself up from 2 L to 3 L  · Oxygen saturation on exam noted to be 100%   · Patient not in any acute respiratory distress   · Lungs are clear on auscultation   · Patient requesting rescue inhaler be placed at the bedside   · Staff notes that last time he was given the inhaler he overused it   · Patient notes that he is not using the rescue inhaler because he does not want to wait for staff to bring it in  · Educated patient on why inhaler cannot be left at the bedside   · Patient notes that he has seen the facility pulmonologist in the past but not recently   · Sophie Hughes the respiratory therapist updated on patients concerns regarding his breathing  · Per Sophie Hughes, patient has been offered a full workup by the pulmonologist including PFT's and a lung cancer screening but notes that the patient did not want to go to the pulmonologist's office to have the testing done  · Will add patient to the pulmonologist's list for next week to evaluate  · Continue current medication regimen   · Prednisone 5 mg daily   · Ipratropium-albuterol five times per day and Q 6 hours prn   · Trelegy daily   · Albuterol-sulfate Q 6 hours prn   · Encourage patient to get out of bed and be more ambulatory   · Will continue to monitor

## 2022-02-26 NOTE — ASSESSMENT & PLAN NOTE
Malnutrition Findings:   · Mild cachexia   · Temporal muscle wasting   · Loss of subcutaneous tissue     BMI Findings:  · Encourage oral intake   · Encourage patient to get out of bed and ambulate   · Facility dietician following   · Continue nutritional supplements  · [employer]

## 2022-02-26 NOTE — ASSESSMENT & PLAN NOTE
· Most recent vitamin D level on 12/21 noted to be low at 21  · Continue current medication regimen   · Ergocalciferol 77863 units weekly  · Will recheck vitamin D level on next set of labs

## 2022-02-26 NOTE — ASSESSMENT & PLAN NOTE
· Patient noted to have periods of anxiety   · Patient denies that this is contributing to his shortness of breath   · Continue current medication regimen   · Buspirone 10 mg BID  · Patient being followed by Dr Nakia Alves the facility psychiatrist   · Continue supportive care  · Continue to monitor

## 2022-02-26 NOTE — ASSESSMENT & PLAN NOTE
· Mood overall appears stable  · Patient notes that he is frustrated with his breathing and that this makes him feel depressed at times  · Denies SI/HI on exam   · Continue current medication regimen   · Paxil 10 mg daily   · Encourage socialization in the halls and activity room  · Patient being followed by Dr Carmina Mir facility psychiatrist   · Continue supportive care

## 2022-02-26 NOTE — ASSESSMENT & PLAN NOTE
· Stable  · Continue current medication regimen  · Omeprazole 20 mg daily   · Calcium carbonate 500 mg Q 6 hours prn

## 2022-02-26 NOTE — ASSESSMENT & PLAN NOTE
· Patient notes that he has a history of bipolar type 2  · He appears to be in remission   · Encourage patient to participate in group activities and ambulate during the day   · Continue current medication regimen   · Lamictal 25 mg daily   · Continue follow-up with Dr Micaela Shaw [facility] psychiatrist

## 2022-03-01 NOTE — PROGRESS NOTES
Progress note - Pulmonary Medicine   Luis Flores 66 y o  male MRN: [id number]       Impression & Plan:     COPD (chronic obstructive pulmonary disease) (HCC)  Continue Trelegy and p r n  albuterol  Encouraged patient to increase activity and exercise as tolerated  Outpatient follow-up in office and to obtain PFT with 6 minute walk test and also ABG  To consider evaluation for endobronchial valve in the future if he qualifies and remains severely dyspneic, although he may be chronically hypercapnic given his elevated CO2 on BMP    Chronic respiratory failure (HCC)  Continue oxygen as needed to maintain pulse ox more than 88%  Check ABG in the outpatient setting    Encounter for screening for malignant neoplasm of lung in former smoker who quit in past 15 years with 30 pack year history or greater  Low-dose CT scan ordered, will follow    Bipolar disorder ([facility] [address] )  Continue current medications, appears in remission    Gastroesophageal reflux disease without esophagitis  Controlled, continue omeprazole      ______________________________________________________________________    HPI:    Patient continues to have severe exertion without significant cough  Denies chest pain  He is not participating in any activities because of dyspnea on exertion      Current Medications:    Current Outpatient Medications:     acetaminophen (TYLENOL) 325 mg tablet, Take 650 mg by mouth every 6 (six) hours as needed, Disp: , Rfl:     albuterol (2 5 mg/3 mL) 0 083 % nebulizer solution, Take 2 5 mg by nebulization every 6 (six) hours as needed, Disp: , Rfl:     benzonatate (TESSALON PERLES) 100 mg capsule, Take 100 mg by mouth 3 (three) times a day as needed, Disp: , Rfl:     busPIRone (BUSPAR) 10 mg tablet, Take 10 mg by mouth 2 (two) times a day, Disp: , Rfl:     calcium carbonate (TUMS) 500 mg chewable tablet, Chew 1 tablet every 6 (six) hours as needed, Disp: , Rfl:     ergocalciferol (ERGOCALCIFEROL) 1 25 MG (47679 UT) capsule, Take 50,000 Units by mouth once a week, Disp: , Rfl:     fluticasone-umeclidinium-vilanterol (TRELEGY) 100-62 5-25 MCG/INH inhaler, Inhale 1 puff daily Rinse mouth after use , Disp: , Rfl:     guaiFENesin (MUCINEX) 600 mg 12 hr tablet, Take 1,200 mg by mouth every 12 (twelve) hours, Disp: , Rfl:     ipratropium-albuterol (DUO-NEB) 0 5-2 5 mg/3 mL nebulizer solution, Take 3 mL by nebulization every 6 (six) hours, Disp: , Rfl:     lamoTRIgine (LaMICtal) 25 mg tablet, Take 50 mg by mouth daily, Disp: , Rfl:     magnesium hydroxide (MILK OF MAGNESIA) 400 mg/5 mL oral suspension, Take by mouth daily as needed for constipation, Disp: , Rfl:     Morphine Sulfate, Concentrate, 20 mg/mL concentrated solution, Take 0 25 mL (5 mg total) by mouth every 6 (six) hours as needed for severe painMax Daily Amount: 20 mg, Disp: 60 mL, Rfl: 0    omeprazole (PriLOSEC) 20 mg delayed release capsule, Take 20 mg by mouth 2 (two) times a day, Disp: , Rfl:     PARoxetine (PAXIL) 20 mg tablet, Take 20 mg by mouth daily, Disp: , Rfl:     predniSONE 5 mg tablet, Take 5 mg by mouth daily, Disp: , Rfl:     sodium chloride (OCEAN) 0 65 % nasal spray, 1 spray into each nostril every 12 (twelve) hours as needed, Disp: , Rfl:     Review of Systems:  Review of Systems   Constitutional: Negative  HENT: Negative  Eyes: Negative  Respiratory: Positive for shortness of breath  Cardiovascular: Negative  Gastrointestinal: Negative  Endocrine: Negative  Genitourinary: Negative  Musculoskeletal: Negative  Skin: Negative  Allergic/Immunologic: Negative  Neurological: Negative  Hematological: Negative  Psychiatric/Behavioral: Negative        Aside from what is mentioned in the HPI, the review of systems is otherwise negative    Past medical history, surgical history, and family history were reviewed and updated as appropriate    Social history updates:  Social History     Tobacco Use   Smoking Status Former Smoker    Packs/day: 2 00    Types: Cigarettes    Quit date: 2018    Years since quittin 1   Smokeless Tobacco Former User    Types: Chew       PhysicalExamination:  Vitals:   Vitals reviewed in chart at the rehab center, stable    General: alert, not in acute distress, mild cachexia  HEENT: PERRL, no icteric sclera or cyanosis, no thrush  Neck:  Supple, no lymphadenopathy or thyromegaly, no JVD  Lungs:  Equal severely diminished breath sounds and clear auscultations bilaterally, no wheezing or crackles  Heart: S1S2 regular, no murmures or gallops  Abdomen: soft, non-tender, bowel sounds  present  Extrimities: no edema, no clubbing or cyanosis  Neuro: Alert and oriented x 3, no focal neurodeficits   Skin: intact, no rashes    Diagnostic Data:  Labs: I personally reviewed the most recent laboratory data pertinent to today's visit    COVID-19 PCR positive on 2020     Labs reviewed from outside facility from 2020:  Hemoglobin 12 3, creatinine 0 68, CO2 32,        Imaging:  I personally reviewed the images on the [facility] system pertinent to today's visit  Chest x-ray report from May 2020 from outside facility:IMPRESSION:   No acute cardiopulmonary abnormality identified        Other studies:  Cardiac nuclear stress test in 2018: IMPRESSION:   Impression: Normal study, estimated LV EF 45-65%        Ramsey Villalobos MD

## 2022-03-02 ENCOUNTER — NURSING HOME VISIT (OUTPATIENT)
Dept: GERIATRICS | Facility: OTHER | Age: 72
End: 2022-03-02
Payer: MEDICARE

## 2022-03-02 ENCOUNTER — NURSING HOME VISIT (OUTPATIENT)
Dept: PULMONOLOGY | Facility: SKILLED NURSING FACILITY | Age: 72
End: 2022-03-02
Payer: MEDICARE

## 2022-03-02 VITALS
TEMPERATURE: 97.2 F | DIASTOLIC BLOOD PRESSURE: 74 MMHG | OXYGEN SATURATION: 97 % | RESPIRATION RATE: 18 BRPM | HEART RATE: 76 BPM | SYSTOLIC BLOOD PRESSURE: 122 MMHG

## 2022-03-02 DIAGNOSIS — K21.9 GASTROESOPHAGEAL REFLUX DISEASE WITHOUT ESOPHAGITIS: ICD-10-CM

## 2022-03-02 DIAGNOSIS — L21.9 SEBORRHEIC DERMATITIS: Primary | ICD-10-CM

## 2022-03-02 DIAGNOSIS — J43.9 PULMONARY EMPHYSEMA, UNSPECIFIED EMPHYSEMA TYPE (HCC): Primary | ICD-10-CM

## 2022-03-02 DIAGNOSIS — F31.9 BIPOLAR AFFECTIVE DISORDER, REMISSION STATUS UNSPECIFIED (HCC): ICD-10-CM

## 2022-03-02 DIAGNOSIS — Z87.891 ENCOUNTER FOR SCREENING FOR MALIGNANT NEOPLASM OF LUNG IN FORMER SMOKER WHO QUIT IN PAST 15 YEARS WITH 30 PACK YEAR HISTORY OR GREATER: ICD-10-CM

## 2022-03-02 DIAGNOSIS — J96.10 CHRONIC RESPIRATORY FAILURE, UNSPECIFIED WHETHER WITH HYPOXIA OR HYPERCAPNIA (HCC): ICD-10-CM

## 2022-03-02 DIAGNOSIS — Z12.2 ENCOUNTER FOR SCREENING FOR MALIGNANT NEOPLASM OF LUNG IN FORMER SMOKER WHO QUIT IN PAST 15 YEARS WITH 30 PACK YEAR HISTORY OR GREATER: ICD-10-CM

## 2022-03-02 PROCEDURE — 99307 SBSQ NF CARE SF MDM 10: CPT

## 2022-03-02 PROCEDURE — 99309 SBSQ NF CARE MODERATE MDM 30: CPT | Performed by: INTERNAL MEDICINE

## 2022-03-02 NOTE — ASSESSMENT & PLAN NOTE
Continue Trelegy and p r n  albuterol  Encouraged patient to increase activity and exercise as tolerated  Initially patient was reluctant and he did not want to do PFTs or CT scan but after further discussion I explained to him that the other choice will be to consider palliative care/hospice which she declined so he agreed to do the CT scan and PFTs    Outpatient follow-up in office and to obtain PFT with 6 minute walk test and also ABG  To consider evaluation for endobronchial valve in the future if he qualifies and remains severely dyspneic, although he may be chronically hypercapnic given his elevated CO2 on BMP

## 2022-03-03 PROBLEM — L21.9 SEBORRHEIC DERMATITIS: Status: ACTIVE | Noted: 2022-03-02

## 2022-03-03 PROBLEM — L21.9 SEBORRHEIC DERMATITIS: Status: ACTIVE | Noted: 2022-03-03

## 2022-03-03 NOTE — ASSESSMENT & PLAN NOTE
· Informed by nursing staff that patient had an erythematous flaky facial rash   · Nursing notes that he has had this rash before and states that he has poor hygiene   · Patient noted to look unkempt on exam   · Patient states that he has been getting this rash intermittently since he was in his 50's  · Notes he tried OTC medications with minimal to no relief   · States he found washing his face to be most helpful in the past  · Patient notes that he is not washing his face   · Erythematous scaly rash noted on bilateral cheeks, nose, and nasolabial folds  · Rash extending down into the beard  · Suspected to be seborrheic dermatitis   · Will order ketoconazole 2% cream Q 12 hours x 4 weeks or until rash has resolved   · Encourage proper hygiene and facial washing daily   · Will continue to monitor

## 2022-03-03 NOTE — PROGRESS NOTES
[address] [facility]  [address]  [phone]  [facility]  Code 32  ACUTE VISIT    Assessment/Plan:    1  Seborrheic dermatitis  Assessment & Plan:  · Informed by nursing staff that patient had an erythematous flaky facial rash   · Nursing notes that he has had this rash before and states that he has poor hygiene   · Patient noted to look unkempt on exam   · Patient states that he has been getting this rash intermittently since he was in his 50's  · Notes he tried OTC medications with minimal to no relief   · States he found washing his face to be most helpful in the past  · Patient notes that he is not washing his face   · Erythematous scaly rash noted on bilateral cheeks, nose, and nasolabial folds  · Rash extending down into the beard  · Suspected to be seborrheic dermatitis   · Will order ketoconazole 2% cream Q 12 hours x 4 weeks or until rash has resolved   · Encourage proper hygiene and facial washing daily   · Will continue to monitor           Subjective: Nursing notes an erythematous rash on patients face  Patient ID: Kailee Forbes is a 66 y o  male  Kailee Forbes is a 66year old male who is a LTC resident of [facility]  He has a past medical history of GERD, COPD, bipolar, anxiety, and depression  He is being seen and evaluated today at the request of nursing for a facial rash  Nursing notes that they have noticed an erythematous flaky rash on the patients face  They note that the patient refuses bathing at times and notes that he does not wash his face  They state that he has had a rash similar to this one in the past and he did require cream for treatment  They note that the patient was not complaining about pain or pruritis  The patient was seen and evaluated today at the bedside  He is noted to be sleeping on my entry into the room  The patient notes that the rash on his face started a few days ago   He notes that approximately 20 years ago he noticed a "small red dot" on the right side of the nose  He notes that from there, it spread over the bridge of the nose to the right side  He notes that he used to wash his face frequently and noted improvement of the rash  He states that over the past 20 years this rash has reoccurred occasionally and tends to spread even further  He notes that OTC medications have not been very helpful in the past  He states that he was given some creams from the doctor but noted minimal improvement with them  He does not remember what creams were prescribed  He notes the rash to be over his cheeks and states that it is spreading downward into the beard  He denies pain or pruritis  He notes that the rash has been flaky  He states "it really doesn't bother me "        Review of Systems   Constitutional: Negative for chills and fever  HENT: Negative for facial swelling  Eyes: Negative for visual disturbance  Respiratory: Positive for shortness of breath (chronic, COPD)  Negative for chest tightness  Cardiovascular: Negative for chest pain  Skin: Positive for color change (erythema to the face) and rash (facial)  Objective:    Vitals:     Vitals:    03/01/22 2337   BP: 122/74   Pulse: 76   Resp: 18   Temp: (!) 97 2 °F (36 2 °C)   SpO2: 97%            Physical Exam  Constitutional:       General: He is not in acute distress  Appearance: He is not ill-appearing or diaphoretic  Comments: Appears unkempt    HENT:      Head: Normocephalic  Nose: No nasal deformity, signs of injury, nasal tenderness, congestion or rhinorrhea  Comments: Erythema and flaky skin over the nose and nasolabial folds     Mouth/Throat:      Mouth: Mucous membranes are dry  No oral lesions  Eyes:      General: Lids are normal  No visual field deficit  Right eye: No discharge  Left eye: No discharge  Cardiovascular:      Rate and Rhythm: Normal rate and regular rhythm  Pulses: Normal pulses        Heart sounds: Normal heart sounds  Pulmonary:      Effort: Pulmonary effort is normal  No respiratory distress  Breath sounds: Normal breath sounds  Musculoskeletal:         General: No swelling  Skin:     Findings: Erythema (facial) and rash (erythematous scaly red rash on bilateral cheeks, nose, and nasolabial folds that extends into his tamayo) present  Rash is scaling  Comments: Denies pain or pruritis on exam   Skin noted to be flaky   Neurological:      General: No focal deficit present  Mental Status: He is alert and oriented to person, place, and time  Mental status is at baseline               Current Outpatient Medications:     acetaminophen (TYLENOL) 325 mg tablet, Take 650 mg by mouth every 6 (six) hours as needed, Disp: , Rfl:     albuterol (2 5 mg/3 mL) 0 083 % nebulizer solution, Take 2 5 mg by nebulization every 6 (six) hours as needed, Disp: , Rfl:     benzonatate (TESSALON PERLES) 100 mg capsule, Take 100 mg by mouth 3 (three) times a day as needed, Disp: , Rfl:     busPIRone (BUSPAR) 10 mg tablet, Take 10 mg by mouth 2 (two) times a day, Disp: , Rfl:     calcium carbonate (TUMS) 500 mg chewable tablet, Chew 1 tablet every 6 (six) hours as needed, Disp: , Rfl:     ergocalciferol (ERGOCALCIFEROL) 1 25 MG (74978 UT) capsule, Take 50,000 Units by mouth once a week, Disp: , Rfl:     fluticasone-umeclidinium-vilanterol (TRELEGY) 100-62 5-25 MCG/INH inhaler, Inhale 1 puff daily Rinse mouth after use , Disp: , Rfl:     guaiFENesin (MUCINEX) 600 mg 12 hr tablet, Take 1,200 mg by mouth every 12 (twelve) hours, Disp: , Rfl:     ipratropium-albuterol (DUO-NEB) 0 5-2 5 mg/3 mL nebulizer solution, Take 3 mL by nebulization every 6 (six) hours, Disp: , Rfl:     lamoTRIgine (LaMICtal) 25 mg tablet, Take 50 mg by mouth daily, Disp: , Rfl:     magnesium hydroxide (MILK OF MAGNESIA) 400 mg/5 mL oral suspension, Take by mouth daily as needed for constipation, Disp: , Rfl:     Morphine Sulfate, Concentrate, 20 mg/mL concentrated solution, Take 0 25 mL (5 mg total) by mouth every 6 (six) hours as needed for severe painMax Daily Amount: 20 mg, Disp: 60 mL, Rfl: 0    omeprazole (PriLOSEC) 20 mg delayed release capsule, Take 20 mg by mouth 2 (two) times a day, Disp: , Rfl:     PARoxetine (PAXIL) 20 mg tablet, Take 20 mg by mouth daily, Disp: , Rfl:     predniSONE 5 mg tablet, Take 5 mg by mouth daily, Disp: , Rfl:     sodium chloride (OCEAN) 0 65 % nasal spray, 1 spray into each nostril every 12 (twelve) hours as needed, Disp: , Rfl:       Plan discussed with Dr Urszula Monge noted agreement with assessment and plan  Please note:  Voice-recognition software may have been used in the preparation of this document  Occasional wrong word or "sound-alike" substitutions may have occurred due to the inherent limitations of voice recognition software  Interpretation should be guided by STEVIE Rey  03/03/22  12:35 AM

## 2022-03-23 ENCOUNTER — NURSING HOME VISIT (OUTPATIENT)
Dept: GERIATRICS | Facility: OTHER | Age: 72
End: 2022-03-23
Payer: MEDICARE

## 2022-03-23 VITALS
HEART RATE: 80 BPM | DIASTOLIC BLOOD PRESSURE: 72 MMHG | RESPIRATION RATE: 16 BRPM | OXYGEN SATURATION: 95 % | SYSTOLIC BLOOD PRESSURE: 116 MMHG | TEMPERATURE: 98.2 F

## 2022-03-23 DIAGNOSIS — J96.10 CHRONIC RESPIRATORY FAILURE, UNSPECIFIED WHETHER WITH HYPOXIA OR HYPERCAPNIA (HCC): ICD-10-CM

## 2022-03-23 DIAGNOSIS — K21.9 GASTROESOPHAGEAL REFLUX DISEASE WITHOUT ESOPHAGITIS: ICD-10-CM

## 2022-03-23 DIAGNOSIS — E55.9 VITAMIN D DEFICIENCY: ICD-10-CM

## 2022-03-23 DIAGNOSIS — F31.9 BIPOLAR AFFECTIVE DISORDER, REMISSION STATUS UNSPECIFIED (HCC): ICD-10-CM

## 2022-03-23 DIAGNOSIS — J43.9 PULMONARY EMPHYSEMA, UNSPECIFIED EMPHYSEMA TYPE (HCC): Primary | ICD-10-CM

## 2022-03-23 PROCEDURE — 99309 SBSQ NF CARE MODERATE MDM 30: CPT | Performed by: INTERNAL MEDICINE

## 2022-03-23 NOTE — ASSESSMENT & PLAN NOTE
-patient is under treatment but psychiatrist  -currently mood since to be stable  -continue Paxil and buspirone

## 2022-03-23 NOTE — PROGRESS NOTES
Assessment/Plan:         Problem List Items Addressed This Visit        Digestive    Gastroesophageal reflux disease without esophagitis     -currently well control, continue same management with omeprazole            Respiratory    COPD (chronic obstructive pulmonary disease) ([facility] ) - Primary     -Recently evaluated by pulmonology by the recommendations:  Continue Trelegy and p r n  albuterol  Encouraged patient to increase activity and exercise as tolerated  Outpatient follow-up in office and to obtain PFT with 6 minute walk test and also ABG  To consider evaluation for endobronchial valve in the future if he qualifies and remains severely dyspneic, although he may be chronically hypercapnic given his elevated CO2 on BMP  -today patient does not have any wheezing but decreased overall breath sounds, denies any cough or increase in sputum production, good saturation on 2 L  Unfortunately patient continues to refuse increasing activity  Explained importance of continue doing incentive spirometry and getting out of bed was given         Chronic respiratory failure ([facility] )     -continue titrating oxygen to keep saturation above 89%            Other    Bipolar disorder ([facility] )     -patient is under treatment but psychiatrist  -currently mood since to be stable  -continue Paxil and buspirone         Vitamin D deficiency     -continue replacement                 Subjective:      Patient ID: Madelaine Hyman is a 66 y o  male  Patient was seen today for a routine follow-up of [facility]   He was recently evaluated by pulmonology that recommended patient to continue inhaler therapy as well as oxygen to keep saturation of 89%, there is plan for PFTs and ABG in the near future  He was recommended to try to increase physical activity and patient unfortunately continues to not following this recommendation  He reports having generalized weakness and increased shortness of breath with exercising    He was explained that this is most likely related also to deconditioning and not only the COPD  He was reinforce of the importance of doing the incentive spirometry and trying to exercise himself a little bit more  Otherwise vital signs remained stable patient on 2 L by nasal cannula today show a saturation of 95%      The following portions of the patient's history were reviewed and updated as appropriate:       Review of Systems:    Review of Systems   Constitutional: Negative for fatigue, fever and unexpected weight change  HENT: Negative for congestion  Respiratory: Positive for shortness of breath  Negative for cough and wheezing  Cardiovascular: Negative for chest pain and palpitations  Gastrointestinal: Negative for constipation, diarrhea, nausea and vomiting  Genitourinary: Negative for dysuria  Musculoskeletal: Negative for arthralgias and back pain  Neurological: Negative for weakness, numbness and headaches  Psychiatric/Behavioral: Negative for confusion  Past Medical and Surgical History:     Past Medical History:   Diagnosis Date    Acute and chronic respiratory failure with hypoxia (HCC)     Asthma     COPD (chronic obstructive pulmonary disease) (HCC)     Delirium     Hypokalemia     Hyponatremia        No past surgical history on file  Family History:    Family History   Problem Relation Age of Onset    Brain cancer Mother     Heart disease Father           Social History:    Substance Use History:   Social History     Substance and Sexual Activity   Alcohol Use Not Currently    Comment: former social drinker     Social History     Tobacco Use   Smoking Status Former Smoker    Packs/day: 2 00    Types: Cigarettes    Quit date:     Years since quittin 2   Smokeless Tobacco Former User    Types: Chew     Social History     Substance and Sexual Activity   Drug Use Not Currently    Types: Marijuana    Comment: twice           Meds/Allergies:    Prior to Admission medications Medication Sig Start Date End Date Taking? Authorizing Provider   acetaminophen (TYLENOL) 325 mg tablet Take 650 mg by mouth every 6 (six) hours as needed    Historical Provider, MD   albuterol (2 5 mg/3 mL) 0 083 % nebulizer solution Take 2 5 mg by nebulization every 6 (six) hours as needed    Historical Provider, MD   benzonatate (TESSALON PERLES) 100 mg capsule Take 100 mg by mouth 3 (three) times a day as needed    Historical Provider, MD   busPIRone (BUSPAR) 10 mg tablet Take 10 mg by mouth 2 (two) times a day    Historical Provider, MD   calcium carbonate (TUMS) 500 mg chewable tablet Chew 1 tablet every 6 (six) hours as needed    Historical Provider, MD   ergocalciferol (ERGOCALCIFEROL) 1 25 MG (44109 UT) capsule Take 50,000 Units by mouth once a week    Historical Provider, MD   fluticasone-umeclidinium-vilanterol (TRELEGY) 100-62 5-25 MCG/INH inhaler Inhale 1 puff daily Rinse mouth after use      Historical Provider, MD   guaiFENesin (MUCINEX) 600 mg 12 hr tablet Take 1,200 mg by mouth every 12 (twelve) hours    Historical Provider, MD   ipratropium-albuterol (DUO-NEB) 0 5-2 5 mg/3 mL nebulizer solution Take 3 mL by nebulization every 6 (six) hours    Historical Provider, MD   lamoTRIgine (LaMICtal) 25 mg tablet Take 50 mg by mouth daily    Historical Provider, MD   magnesium hydroxide (MILK OF MAGNESIA) 400 mg/5 mL oral suspension Take by mouth daily as needed for constipation    Historical Provider, MD   Morphine Sulfate, Concentrate, 20 mg/mL concentrated solution Take 0 25 mL (5 mg total) by mouth every 6 (six) hours as needed for severe painMax Daily Amount: 20 mg 2/6/21   Rafael Biswas MD   omeprazole (PriLOSEC) 20 mg delayed release capsule Take 20 mg by mouth 2 (two) times a day    Historical Provider, MD   PARoxetine (PAXIL) 20 mg tablet Take 20 mg by mouth daily    Historical Provider, MD   predniSONE 5 mg tablet Take 5 mg by mouth daily    Historical Provider, MD   sodium chloride (OCEAN) 0 65 % nasal spray 1 spray into each nostril every 12 (twelve) hours as needed    Historical Provider, MD       Allergies: Allergies   Allergen Reactions    Penicillins        Objective: There were no vitals filed for this visit  There is no height or weight on file to calculate BMI  Physical Exam  HENT:      Head: Normocephalic and atraumatic  Cardiovascular:      Rate and Rhythm: Normal rate  Pulses: Normal pulses  Heart sounds: Normal heart sounds  Pulmonary:      Breath sounds: No wheezing or rales  Comments: Bilateral diffuse decreased breath sounds   Abdominal:      General: Abdomen is flat  Bowel sounds are normal       Palpations: Abdomen is soft  Musculoskeletal:         General: Normal range of motion  Skin:     General: Skin is warm  Neurological:      General: No focal deficit present  Mental Status: He is alert  Mental status is at baseline  Cranial Nerves: No cranial nerve deficit        Comments: Alert and oriented x2   Psychiatric:         Mood and Affect: Mood normal

## 2022-03-23 NOTE — ASSESSMENT & PLAN NOTE
-Recently evaluated by pulmonology by the recommendations:  Continue Trelegy and p r n  albuterol  Encouraged patient to increase activity and exercise as tolerated  Outpatient follow-up in office and to obtain PFT with 6 minute walk test and also ABG  To consider evaluation for endobronchial valve in the future if he qualifies and remains severely dyspneic, although he may be chronically hypercapnic given his elevated CO2 on BMP  -today patient does not have any wheezing but decreased overall breath sounds, denies any cough or increase in sputum production, good saturation on 2 L  Unfortunately patient continues to refuse increasing activity    Explained importance of continue doing incentive spirometry and getting out of bed was given

## 2022-04-21 ENCOUNTER — NURSING HOME VISIT (OUTPATIENT)
Dept: GERIATRICS | Facility: OTHER | Age: 72
End: 2022-04-21
Payer: MEDICARE

## 2022-04-21 DIAGNOSIS — E46 PROTEIN-CALORIE MALNUTRITION, UNSPECIFIED SEVERITY (HCC): ICD-10-CM

## 2022-04-21 DIAGNOSIS — E55.9 VITAMIN D DEFICIENCY: ICD-10-CM

## 2022-04-21 DIAGNOSIS — F41.9 ANXIETY: ICD-10-CM

## 2022-04-21 DIAGNOSIS — F32.9 MAJOR DEPRESSIVE DISORDER WITH CURRENT ACTIVE EPISODE, UNSPECIFIED DEPRESSION EPISODE SEVERITY, UNSPECIFIED WHETHER RECURRENT: ICD-10-CM

## 2022-04-21 DIAGNOSIS — Z87.891 PERSONAL HISTORY OF TOBACCO USE: ICD-10-CM

## 2022-04-21 DIAGNOSIS — K21.9 GASTROESOPHAGEAL REFLUX DISEASE WITHOUT ESOPHAGITIS: ICD-10-CM

## 2022-04-21 DIAGNOSIS — J43.9 PULMONARY EMPHYSEMA, UNSPECIFIED EMPHYSEMA TYPE (HCC): Primary | ICD-10-CM

## 2022-04-21 DIAGNOSIS — F31.9 BIPOLAR AFFECTIVE DISORDER, REMISSION STATUS UNSPECIFIED (HCC): ICD-10-CM

## 2022-04-21 PROCEDURE — 99309 SBSQ NF CARE MODERATE MDM 30: CPT

## 2022-04-30 VITALS
OXYGEN SATURATION: 96 % | HEART RATE: 62 BPM | RESPIRATION RATE: 18 BRPM | DIASTOLIC BLOOD PRESSURE: 62 MMHG | SYSTOLIC BLOOD PRESSURE: 101 MMHG | TEMPERATURE: 97.8 F

## 2022-04-30 NOTE — ASSESSMENT & PLAN NOTE
· Patient noted to have quit smoking approximately 2 years ago  · Patient notes worsening shortness of breath over the past month   · Pulmonology recommended lung cancer screening   · Patient initially agreed to see the pulmonologist as an outpatient but is now declining further workup   · Respiratory therapist will follow at the facility but the patient will not likely be seen by the pulmonologist as any additional treatment or changes in management warrants PFT

## 2022-04-30 NOTE — ASSESSMENT & PLAN NOTE
· Most recent vitamin D level on 12/21 noted to be low at 21  · Continue current medication regimen   · Ergocalciferol 57888 units weekly  · Will recheck vitamin D level on next set of labs

## 2022-04-30 NOTE — ASSESSMENT & PLAN NOTE
· Mood overall appears stable  · Patient notes that he is frustrated with his breathing and that this makes him feel depressed at times  · Denies SI/HI on exam   · Continue current medication regimen   · Paxil 10 mg daily   · Encourage socialization in the halls and activity room  · Patient states he can't due to his respiratory status  · Discussed pivoting into a wheelchair and being wheeled down to the activity room to socialize with other residents (even if it is just once or twice per week)  · Patient being followed by Dr Russell [facility] psychiatrist   · Continue supportive care

## 2022-04-30 NOTE — ASSESSMENT & PLAN NOTE
· Patient notes that he has a history of bipolar type 2  · He appears to be in remission   · Encourage patient to participate in group activities and ambulate during the day   · Patient states he cant because he can't walk due to his breathing  · Encouraged to pivot into a wheelchair to go out to activities (even if it is just once per week)  · Continue current medication regimen   · Lamictal 25 mg daily   · Continue follow-up with Dr Leonila Srinivasan [facility] psychiatrist

## 2022-04-30 NOTE — ASSESSMENT & PLAN NOTE
· Patient has chronic history of COPD  · Notes increased shortness of breath over the past few month  · Noted to be on 3 L of oxygen on exam   · Nasal cannula noted to be in his mouth as he states he is a mouth breather  · Notes that he titrates his oxygen up by himself  · Oxygen saturation on exam noted to be 99%   · Patient not in any acute respiratory distress   · Lungs are clear on auscultation   · Discussed further pulmonary workup with patient   · Initially he was willing to go to see the pulmonologist in the office for testing but he has now declined further workup  · Discussed with [facility] the respiratory therapist who is aware   · Continue current medication regimen   · Prednisone 5 mg daily   · Ipratropium-albuterol five times per day and Q 6 hours prn   · Trelegy daily   · Albuterol-sulfate Q 6 hours prn   · Encouraged and discussed the patient to get out of bed and be more ambulatory   · Will continue to monitor

## 2022-04-30 NOTE — ASSESSMENT & PLAN NOTE
· Patient noted to have periods of anxiety   · Patient denies that this is contributing to his shortness of breath   · Continue current medication regimen   · Buspirone 10 mg BID  · Patient being followed by Dr Flavia Aldana the facility psychiatrist   · Continue supportive care  · Continue to monitor

## 2022-04-30 NOTE — PROGRESS NOTES
[facility]  [address]  [phone]  Genaro Mascorro  Code 28 ([facility])        NAME: Guerline Renner  AGE: 66 y o  SEX: male CODE STATUS: DNR    DATE OF ENCOUNTER: 4/21/2022    Assessment and Plan     1  Pulmonary emphysema, unspecified emphysema type ([address] )  Assessment & Plan:  · Patient has chronic history of COPD  · Notes increased shortness of breath over the past few month  · Noted to be on 3 L of oxygen on exam   · Nasal cannula noted to be in his mouth as he states he is a mouth breather  · Notes that he titrates his oxygen up by himself  · Oxygen saturation on exam noted to be 99%   · Patient not in any acute respiratory distress   · Lungs are clear on auscultation   · Discussed further pulmonary workup with patient   · Initially he was willing to go to see the pulmonologist in the office for testing but he has now declined further workup  · Discussed with Nikki Castillo the respiratory therapist who is aware   · Continue current medication regimen   · Prednisone 5 mg daily   · Ipratropium-albuterol five times per day and Q 6 hours prn   · Trelegy daily   · Albuterol-sulfate Q 6 hours prn   · Encouraged and discussed the patient to get out of bed and be more ambulatory   · Will continue to monitor      2  Personal history of tobacco use  Assessment & Plan:  · Patient noted to have quit smoking approximately 2 years ago  · Patient notes worsening shortness of breath over the past month   · Pulmonology recommended lung cancer screening   · Patient initially agreed to see the pulmonologist as an outpatient but is now declining further workup   · Respiratory therapist will follow at the facility but the patient will not likely be seen by the pulmonologist as any additional treatment or changes in management warrants PFT      3   Anxiety  Assessment & Plan:  · Patient noted to have periods of anxiety   · Patient denies that this is contributing to his shortness of breath   · Continue current medication regimen   · Buspirone 10 mg BID  · Patient being followed by Dr Chris Gay the facility psychiatrist   · Continue supportive care  · Continue to monitor      4  Major depressive disorder with current active episode, unspecified depression episode severity, unspecified whether recurrent  Assessment & Plan:  · Mood overall appears stable  · Patient notes that he is frustrated with his breathing and that this makes him feel depressed at times  · Denies SI/HI on exam   · Continue current medication regimen   · Paxil 10 mg daily   · Encourage socialization in the halls and activity room  · Patient states he can't due to his respiratory status  · Discussed pivoting into a wheelchair and being wheeled down to the activity room to socialize with other residents (even if it is just once or twice per week)  · Patient being followed by Dr Chris Gay [facility] psychiatrist   · Continue supportive care      5  Bipolar affective disorder, remission status unspecified ([facility] )  Assessment & Plan:  · Patient notes that he has a history of bipolar type 2  · He appears to be in remission   · Encourage patient to participate in group activities and ambulate during the day   · Patient states he cant because he can't walk due to his breathing  · Encouraged to pivot into a wheelchair to go out to activities (even if it is just once per week)  · Continue current medication regimen   · Lamictal 25 mg daily   · Continue follow-up with Dr Chris Gay [facility] psychiatrist      6  Gastroesophageal reflux disease without esophagitis  Assessment & Plan:  · Stable  · Continue current medication regimen  · Omeprazole 20 mg daily   · Calcium carbonate 500 mg Q 6 hours prn      7   Protein-calorie malnutrition, unspecified severity ([facility] )  Assessment & Plan:  Malnutrition Findings:   · Mild cachexia   · Temporal muscle wasting   · Loss of subcutaneous tissue     BMI Findings:  · Encourage oral intake   · Encourage patient to get out of bed and ambulate · Facility dietician following   · Continue nutritional supplements  · [employer]         8  Vitamin D deficiency  Assessment & Plan:  · Most recent vitamin D level on 12/21 noted to be low at 21  · Continue current medication regimen   · Ergocalciferol 84401 units weekly  · Will recheck vitamin D level on next set of labs         All medications and routine orders were reviewed and updated as needed  Chief Complaint     LTC follow-up visit  History of Present Illness     Nikki Joiner is a 66year old male who is a long term care resident of [facility]  His past medical history includes but is not limited to GERD, COPD, anxiety, bipolar, depression, and protein calorie malnutrition  He is being seen and evaluated today in collaboration with nursing for medical management and LTC follow-up  The patient was seen and evaluated today at the bedside  The patient is noted to be lying in bed comfortably on my entry into the room  He is alert and oriented x4 and is able to make his needs known  He notes that he is feeling okay today  He states that over the past month he has noticed that his breathing has gotten worse  He has been seen by the pulmonologist at the facility and initially agreed to be seen in the office for pulmonary testing including PFTs and a lung cancer screening  Today he states that he no longer wishes to follow-up as an outpatient  He is conversing with me without difficulty  He is noted to be on supplemental oxygen and the nasal cannula is noted to be in his mouth as he states he is a mouth breather  He states that he has been increasing his oxygen and is currently on 3 L  His oxygen saturation on exam is noted to be 99% on 3 L  He offers no other complaints today  He denies dizziness, lightheadedness, headaches, and vision changes  He denies pain on exam  He notes no chest pain, palpitations, or cough  He denies nausea, vomiting, constipation, and diarrhea   He notes no issues with his bowels or bladder  He notes he does have issues making it to the bathroom to have a bowel movement due to his shortness of breath  He uses the urinal at bedside when voiding  Per the SNF records, his last bowel movement was noted to be on 4/18  He states that he has a good appetite and is trying to stay hydrated  Per the SNF records, he is eating 3 meals per day, consuming % of each meal  There are no concerns from nursing at this time  The patient's allergies, past medical, surgical, social and family history were reviewed and unchanged  Review of Systems     Review of Systems   Constitutional: Positive for activity change  Negative for appetite change, chills, fatigue and fever  HENT: Negative for congestion, rhinorrhea and sore throat  Respiratory: Positive for shortness of breath  Negative for cough and chest tightness  Cardiovascular: Negative for chest pain and palpitations  Gastrointestinal: Negative for abdominal distention, abdominal pain, constipation, diarrhea, nausea and vomiting  Genitourinary: Negative for difficulty urinating, dysuria, frequency and urgency  Musculoskeletal: Positive for gait problem  Negative for arthralgias and myalgias  Skin: Negative for color change and pallor  Neurological: Positive for weakness (generalized)  Negative for dizziness, light-headedness, numbness and headaches  Psychiatric/Behavioral: Negative for behavioral problems, confusion and sleep disturbance  The patient is nervous/anxious (at times)  Objective     Vitals: Per SNF records     Vitals:    04/20/22 1750   BP: 101/62   Pulse: 62   Resp: 18   Temp: 97 8 °F (36 6 °C)   SpO2: 96%       Physical Exam  Vitals and nursing note reviewed  Constitutional:       General: He is not in acute distress  Appearance: Normal appearance  He is normal weight  He is not ill-appearing  HENT:      Head: Normocephalic  Nose: No congestion or rhinorrhea        Mouth/Throat: Mouth: Mucous membranes are dry  Cardiovascular:      Rate and Rhythm: Normal rate and regular rhythm  Pulses: Normal pulses  Heart sounds: Normal heart sounds  No murmur heard  Pulmonary:      Effort: Pulmonary effort is normal  No respiratory distress  Breath sounds: Normal breath sounds  No wheezing, rhonchi or rales  Comments: Patient is able to take deep breaths on exam   Nasal cannula noted to be in the patients mouth  Oxygen saturation noted to be 99% on 3 L of oxygen  Abdominal:      General: Bowel sounds are normal  There is no distension  Palpations: Abdomen is soft  Tenderness: There is no abdominal tenderness  Musculoskeletal:         General: No swelling, tenderness or signs of injury  Skin:     General: Skin is warm and dry  Coloration: Skin is not pale  Findings: No bruising or erythema  Neurological:      General: No focal deficit present  Mental Status: He is alert and oriented to person, place, and time  Mental status is at baseline  Motor: Weakness (generalized) present  Gait: Gait abnormal    Psychiatric:         Mood and Affect: Mood is anxious (at times)  Speech: Speech normal          Behavior: Behavior is cooperative  Thought Content: Thought content does not include homicidal or suicidal ideation  Pertinent Laboratory/Diagnostic Studies:   Reviewed in facility chart-stable    Current Medications   Medications reviewed and updated see facility [facility] for details        Current Outpatient Medications:     acetaminophen (TYLENOL) 325 mg tablet, Take 650 mg by mouth every 6 (six) hours as needed, Disp: , Rfl:     albuterol (2 5 mg/3 mL) 0 083 % nebulizer solution, Take 2 5 mg by nebulization every 6 (six) hours as needed, Disp: , Rfl:     benzonatate (TESSALON PERLES) 100 mg capsule, Take 100 mg by mouth 3 (three) times a day as needed, Disp: , Rfl:     busPIRone (BUSPAR) 10 mg tablet, Take 10 mg by mouth 2 (two) times a day, Disp: , Rfl:     calcium carbonate (TUMS) 500 mg chewable tablet, Chew 1 tablet every 6 (six) hours as needed, Disp: , Rfl:     ergocalciferol (ERGOCALCIFEROL) 1 25 MG (41636 UT) capsule, Take 50,000 Units by mouth once a week, Disp: , Rfl:     fluticasone-umeclidinium-vilanterol (TRELEGY) 100-62 5-25 MCG/INH inhaler, Inhale 1 puff daily Rinse mouth after use , Disp: , Rfl:     guaiFENesin (MUCINEX) 600 mg 12 hr tablet, Take 1,200 mg by mouth every 12 (twelve) hours, Disp: , Rfl:     ipratropium-albuterol (DUO-NEB) 0 5-2 5 mg/3 mL nebulizer solution, Take 3 mL by nebulization every 6 (six) hours, Disp: , Rfl:     lamoTRIgine (LaMICtal) 25 mg tablet, Take 50 mg by mouth daily, Disp: , Rfl:     magnesium hydroxide (MILK OF MAGNESIA) 400 mg/5 mL oral suspension, Take by mouth daily as needed for constipation, Disp: , Rfl:     Morphine Sulfate, Concentrate, 20 mg/mL concentrated solution, Take 0 25 mL (5 mg total) by mouth every 6 (six) hours as needed for severe painMax Daily Amount: 20 mg, Disp: 60 mL, Rfl: 0    omeprazole (PriLOSEC) 20 mg delayed release capsule, Take 20 mg by mouth 2 (two) times a day, Disp: , Rfl:     PARoxetine (PAXIL) 20 mg tablet, Take 20 mg by mouth daily, Disp: , Rfl:     predniSONE 5 mg tablet, Take 5 mg by mouth daily, Disp: , Rfl:     sodium chloride (OCEAN) 0 65 % nasal spray, 1 spray into each nostril every 12 (twelve) hours as needed, Disp: , Rfl:      Plan discussed with Dr Neri Filter  Dr Neri Filter noted agreement with assessment and plan  Please note:  Voice-recognition software may have been used in the preparation of this document  Occasional wrong word or "sound-alike" substitutions may have occurred due to the inherent limitations of voice recognition software  Interpretation should be guided by context           STEVIE Hamm  4/30/2022  7:20 PM

## 2022-05-26 ENCOUNTER — NURSING HOME VISIT (OUTPATIENT)
Dept: GERIATRICS | Facility: OTHER | Age: 72
End: 2022-05-26
Payer: MEDICARE

## 2022-05-26 DIAGNOSIS — F41.9 ANXIETY: ICD-10-CM

## 2022-05-26 DIAGNOSIS — J43.9 PULMONARY EMPHYSEMA, UNSPECIFIED EMPHYSEMA TYPE (HCC): Primary | ICD-10-CM

## 2022-05-26 PROCEDURE — 99309 SBSQ NF CARE MODERATE MDM 30: CPT | Performed by: INTERNAL MEDICINE

## 2022-05-26 NOTE — PROGRESS NOTES
[facility]  [address]   [address] [facility] [address] [place], [place], [address]     Progress Note  Code [id number]    Patient Location     [facility]    Reason for visit     Routine follow up    Patients care was coordinated with nursing facility staff  Recent vitals, labs and updated medications were reviewed on [facility]  Problem List Items Addressed This Visit        Respiratory    COPD (chronic obstructive pulmonary disease) ([facility] ) - Primary     Patient has chronic history of COPD  Notes increased shortness of breath over the past few months  Noted to be on 3 L of oxygen  Saturating well  He has scattered expiratory wheezing on exam  He has declined further pulmonary workup  Continue Trelegy 1 puff daily  Continue prednisone 5 mg daily  Continue ipratropium-albuterol 5 times a day and q 6h p r n  Other    Anxiety     Continue buspirone 10 mg b i d  Being followed by Dr Manjula Robertson via the facility psychiatrist                     74-year-old male who is a long-term care resident of [facility]  Past medical history includes but is not limited to GERD, COPD, anxiety, bipolar, depression, protein calorie malnutrition  He is being seen and evaluated today in collaboration with nursing for medical management an LTC follow-up  Patient was seen and evaluated today at bedside  He is noted to be lying in bed comfortably on entry into the room  He is alert and oriented x4 and is able to make his needs known  He notes that he is feeling okay today  He feels like his breathing is getting worse  He has been seen by pulmonologist at the facility and initially agreed to get PFTs and a screening chest CT  He seems to have changes mine after that  He is on supplemental oxygen  He has no other complaints today  Denies issues with bowel or bladder  He sometimes gets short of breath going to the bathroom    He has a good appetite and is staying hydrated  Review of Systems   Constitutional: Negative  HENT: Negative  Eyes: Negative  Respiratory: Negative  Cardiovascular: Negative  Gastrointestinal: Negative  Endocrine: Negative  Genitourinary: Negative  Musculoskeletal: Negative  Skin: Negative  Allergic/Immunologic: Negative  Neurological: Negative  Hematological: Negative  Psychiatric/Behavioral: Negative  Past Medical History:   Diagnosis Date    Acute and chronic respiratory failure with hypoxia (HCC)     Asthma     COPD (chronic obstructive pulmonary disease) (HCC)     Delirium     Hypokalemia     Hyponatremia        No past surgical history on file      Social History     Tobacco Use   Smoking Status Former Smoker    Packs/day: 2 00    Types: Cigarettes    Quit date:     Years since quittin 4   Smokeless Tobacco Former User    Types: Chew       Family History   Problem Relation Age of Onset    Brain cancer Mother     Heart disease Father         Allergies   Allergen Reactions    Penicillins          Current Outpatient Medications:     acetaminophen (TYLENOL) 325 mg tablet, Take 650 mg by mouth every 6 (six) hours as needed, Disp: , Rfl:     albuterol (2 5 mg/3 mL) 0 083 % nebulizer solution, Take 2 5 mg by nebulization every 6 (six) hours as needed, Disp: , Rfl:     benzonatate (TESSALON PERLES) 100 mg capsule, Take 100 mg by mouth 3 (three) times a day as needed, Disp: , Rfl:     busPIRone (BUSPAR) 10 mg tablet, Take 10 mg by mouth 2 (two) times a day, Disp: , Rfl:     calcium carbonate (TUMS) 500 mg chewable tablet, Chew 1 tablet every 6 (six) hours as needed, Disp: , Rfl:     ergocalciferol (ERGOCALCIFEROL) 1 25 MG (45796 UT) capsule, Take 50,000 Units by mouth once a week, Disp: , Rfl:     fluticasone-umeclidinium-vilanterol (TRELEGY) 100-62 5-25 MCG/INH inhaler, Inhale 1 puff daily Rinse mouth after use , Disp: , Rfl:     guaiFENesin (MUCINEX) 600 mg 12 hr tablet, Take 1,200 mg by mouth every 12 (twelve) hours, Disp: , Rfl:     ipratropium-albuterol (DUO-NEB) 0 5-2 5 mg/3 mL nebulizer solution, Take 3 mL by nebulization every 6 (six) hours, Disp: , Rfl:     lamoTRIgine (LaMICtal) 25 mg tablet, Take 50 mg by mouth daily, Disp: , Rfl:     magnesium hydroxide (MILK OF MAGNESIA) 400 mg/5 mL oral suspension, Take by mouth daily as needed for constipation, Disp: , Rfl:     Morphine Sulfate, Concentrate, 20 mg/mL concentrated solution, Take 0 25 mL (5 mg total) by mouth every 6 (six) hours as needed for severe painMax Daily Amount: 20 mg, Disp: 60 mL, Rfl: 0    omeprazole (PriLOSEC) 20 mg delayed release capsule, Take 20 mg by mouth 2 (two) times a day, Disp: , Rfl:     PARoxetine (PAXIL) 20 mg tablet, Take 20 mg by mouth daily, Disp: , Rfl:     predniSONE 5 mg tablet, Take 5 mg by mouth daily, Disp: , Rfl:     sodium chloride (OCEAN) 0 65 % nasal spray, 1 spray into each nostril every 12 (twelve) hours as needed, Disp: , Rfl:     Updated list was reviewed in [facility] system of facility  There were no vitals filed for this visit  Physical Exam  Vitals reviewed  HENT:      Head: Normocephalic and atraumatic  Mouth/Throat:      Mouth: Mucous membranes are moist    Eyes:      Extraocular Movements: Extraocular movements intact  Pupils: Pupils are equal, round, and reactive to light  Cardiovascular:      Rate and Rhythm: Normal rate and regular rhythm  Pulses: Normal pulses  Heart sounds: Normal heart sounds  Pulmonary:      Breath sounds: Wheezing present  Comments: Scattered Expiratory wheeze  Abdominal:      General: Abdomen is flat  Palpations: Abdomen is soft  Musculoskeletal:         General: Normal range of motion  Cervical back: Normal range of motion  Skin:     General: Skin is warm and dry  Neurological:      Mental Status: He is alert and oriented to person, place, and time   Mental status is at baseline  Psychiatric:         Mood and Affect: Mood normal          Diagnostic Data:    Recent labs were reviewed  Additional Notes:      This note was electronically signed by Dr Ignacia Escobar

## 2022-05-26 NOTE — ASSESSMENT & PLAN NOTE
Patient has chronic history of COPD  Notes increased shortness of breath over the past few months  Noted to be on 3 L of oxygen  Saturating well  He has scattered expiratory wheezing on exam  He has declined further pulmonary workup  Continue Trelegy 1 puff daily  Continue prednisone 5 mg daily  Continue ipratropium-albuterol 5 times a day and q 6h p r n

## 2022-06-08 ENCOUNTER — TELEPHONE (OUTPATIENT)
Dept: OTHER | Facility: OTHER | Age: 72
End: 2022-06-08

## 2022-06-08 ENCOUNTER — NURSING HOME VISIT (OUTPATIENT)
Dept: GERIATRICS | Facility: OTHER | Age: 72
End: 2022-06-08
Payer: MEDICARE

## 2022-06-08 DIAGNOSIS — R06.02 SHORTNESS OF BREATH: Primary | ICD-10-CM

## 2022-06-08 DIAGNOSIS — Z71.89 GOALS OF CARE, COUNSELING/DISCUSSION: ICD-10-CM

## 2022-06-08 PROCEDURE — 99307 SBSQ NF CARE SF MDM 10: CPT

## 2022-06-08 NOTE — PROGRESS NOTES
[facility]  Beronica Chun 78  [phone]  [address] [place] [address] Street  Code 32  Acute Visit    Assessment/Plan:    1  Shortness of breath  Assessment & Plan:  · Patient noted to have shortness of breath on exam today  · Patient does have a known history of COPD and chronic shortness of breath   · Has refused further workup in the past as he does not wish to leave the facility to have the workup completed   · Was given albuterol inhaler overnight for shortness of breath   · Staff also obtained order from the on call for an additional DuoNeb which was administered at 0630  · Oxygen saturation at 0700 noted to be 84% on 4 L   · On exam, patient has improved oxygenation to 95% on 4 L   · Patient is noted to have a moist cough   · Expiratory wheezes noted on auscultation   · Ordered STAT BMP and CBC which were overall stable and did not indicate infection   · WBC count stable at 8 5  · STAT chest x-ray revealed no acute cardiopulmonary process  · Suspect COPD exacerbation   · Will order prednisone taper   · Prednisone 40 mg x 5 days, 30 mg x 5 days, 20 mg x 5 days, and 10 mg x 5 days   · Continue to keep oxygen saturation greater than 88%   · Continue to closely monitor       2  Goals of care, counseling/discussion  Assessment & Plan:  · Patient has mentioned in the past that he would like to pursue hospice  · Has been evaluated by palliative care in the past   · Palliative care again evaluated today and new POLST was filled out  · Patient wishes to be DNI/DNR under hospice services  · Does not wish at this time to be [facility] as he states that if he thinks he could get more nebulizer treatments at the hospital which would make him more comfortable he would be willing to go  · Code status updated  · Palliative care will continue to follow and consult hospice            Subjective:      Patient ID: Gilda Harada is a 66 y o  male  Gilda Harada is a 66year old male [facility]  His past medical history includes but is not limited to GERD, COPD, anxiety, bipolar, depression, and protein calorie malnutrition  He is being seen and evaluated today in collaboration with nursing for shortness of breath  Nursing states that overnight, the patient was complaining he felt short of breath  He was given his rescue inhaler at 2200 and his oxygen saturation remained stable on evening shift at 94-95% on 4 L He was given a nebulizer treatment overnight at 0238 and his rescue inhaler again at 0413 for additional complaints of shortness of breath  The on call provider was contacted at 0530 as patient continued to state that he was short of breath  His vital signs per SNF charting were stable and as follows: /61, P 78, RR 22, Temp 97 3, O2 95% on 4 L  Staff was given a one time order of Duoneb which was given at [address] [place] [address] Rd , [address]  At 0700, he again complained of worsening shortness of breath  His vital signs were as follows per SNF charting: /102, P 112, O2 87 on 4 L , pain 0, RR 25  He did not wish to go out to the ED at this time  The patient was seen and evaluated at the bedside around 0800  He is noted to be lying in bed comfortably in mildly acute respiratory distress  His nasal cannula was in his nose on my entry into the room, however, he moved it to his mouth as he states he is a mouth breather  He has been known to do this and typically has his nasal cannula in his mouth  He states that for the past few days he has been experiencing a cough and is now experiencing worsening shortness of breath  He states that he feels that he is dying  He is noted to be on 4 L of oxygen via nasal cannula with an oxygen saturation of 95% on my exam  He is noted to have a moist non-productive cough  He states that he is not able to cough it up because "I have never been able to do that " He states that if there is mucous in the back of his throat he just swallows it  He denies fever or chills   As he has in the past, he asked for his rescue inhaler to be placed at the bedside and he was educated on why this cannot happen  His blood pressure at the time of my exam was 136/76 manually  Review of Systems   Constitutional: Negative for activity change, chills, fatigue and fever  HENT: Negative for congestion, postnasal drip, rhinorrhea, sinus pressure, sinus pain, sneezing and sore throat  Respiratory: Positive for cough (moist non-productive), chest tightness and shortness of breath  Cardiovascular: Negative for chest pain and palpitations  Skin: Negative for color change  Neurological: Positive for weakness  Objective:    Vitals: Per SNF records    Vitals:    06/08/22 1639   BP: 130/74   Pulse: 78   Resp: 18   Temp: 97 5 °F (36 4 °C)   SpO2: 96%        Physical Exam  Vitals and nursing note reviewed  Constitutional:       General: He is in acute distress  Appearance: He is ill-appearing  He is not diaphoretic  HENT:      Nose: Nose normal  No congestion or rhinorrhea  Mouth/Throat:      Mouth: Mucous membranes are dry  Eyes:      Conjunctiva/sclera: Conjunctivae normal    Cardiovascular:      Rate and Rhythm: Regular rhythm  Tachycardia present  Pulses: Normal pulses  Heart sounds: No murmur heard  Pulmonary:      Effort: No respiratory distress  Breath sounds: No stridor  Decreased breath sounds and wheezing (expiratory) present  No rhonchi or rales  Comments: Patient is conversing without difficulty   Noted to have a moist cough on exam   Neurological:      General: No focal deficit present  Mental Status: He is alert and oriented to person, place, and time  Mental status is at baseline  Motor: Weakness (generalized) present  No current facility-administered medications for this visit  No current outpatient medications on file      Facility-Administered Medications Ordered in Other Visits:     acetaminophen (TYLENOL) tablet 650 mg, 650 mg, Oral, Q6H PRN, Steve Washington MD    albuterol (PROVENTIL HFA,VENTOLIN HFA) inhaler 2 puff, 2 puff, Inhalation, Q4H PRN, Kimi Ro MD, 2 puff at 06/11/22 2139    benzonatate (TESSALON PERLES) capsule 100 mg, 100 mg, Oral, TID PRN, Steve Washington MD, 100 mg at 06/11/22 2139    busPIRone (BUSPAR) tablet 10 mg, 10 mg, Oral, BID, Steve Washington MD, 10 mg at 06/12/22 0851    calcium carbonate (TUMS) chewable tablet 500 mg, 500 mg, Oral, Q6H PRN, Steve Washington MD    diphenhydrAMINE (BENADRYL) tablet 25 mg, 25 mg, Oral, Q8H PRN, Lino Cabezas MD, 25 mg at 06/12/22 7631    docusate sodium (COLACE) capsule 100 mg, 100 mg, Oral, BID, Steve Washington MD, 100 mg at 06/12/22 0851    formoterol (PERFOROMIST) nebulizer solution 20 mcg, 20 mcg, Nebulization, Q12H, Barak Plata MD, 20 mcg at 06/12/22 0807    guaiFENesin (MUCINEX) 12 hr tablet 1,200 mg, 1,200 mg, Oral, Q12H [address], Steve Washington MD, 1,200 mg at 06/12/22 0851    heparin (porcine) subcutaneous injection 5,000 Units, 5,000 Units, Subcutaneous, Q8H [address], Steve Washington MD, 5,000 Units at 06/12/22 0509    ipratropium (ATROVENT) 0 02 % inhalation solution 0 5 mg, 0 5 mg, Nebulization, TID, Barak Plata MD, 0 5 mg at 06/12/22 0752    lamoTRIgine (LaMICtal) tablet 50 mg, 50 mg, Oral, Daily, Steve Washington MD, 50 mg at 06/12/22 0851    methylPREDNISolone sodium succinate (Solu-MEDROL) injection 40 mg, 40 mg, Intravenous, Q8H [address], Barak Plata MD, 40 mg at 06/12/22 0510    pantoprazole (PROTONIX) EC tablet 40 mg, 40 mg, Oral, Early Morning, Steve Washington MD, 40 mg at 06/12/22 0509    PARoxetine (PAXIL) tablet 10 mg, 10 mg, Oral, Daily, Steve Washington MD, 10 mg at 06/12/22 0851    senna (SENOKOT) tablet 8 6 mg, 1 tablet, Oral, Daily, Steve Washington MD, 8 6 mg at 06/12/22 0945      Plan discussed with Dr Sara Clay noted agreement with assessment and plan       Please note: Voice-recognition software may have been used in the preparation of this document  Occasional wrong word or "sound-alike" substitutions may have occurred due to the inherent limitations of voice recognition software  Interpretation should be guided by context         STEVIE Houser  06/10/22 9:33 AM

## 2022-06-08 NOTE — TELEPHONE ENCOUNTER
Quyen at [facility] is reporting that the patient is complaining of shortness of breath and chest tightness  Patient has had his rescue inhaler and morphine but he is still complaining of it and asking for his rescue inhaler  Patient has a history of CHF and COPD  [place] connect message sent to the on call provider with Quyen's call back information

## 2022-06-09 ENCOUNTER — APPOINTMENT (EMERGENCY)
Dept: RADIOLOGY | Facility: HOSPITAL | Age: 72
DRG: 189 | End: 2022-06-09
Payer: MEDICARE

## 2022-06-09 ENCOUNTER — HOSPITAL ENCOUNTER (INPATIENT)
Facility: HOSPITAL | Age: 72
LOS: 4 days | Discharge: NURSING, REHAB OR LONG-TERM CARE | DRG: 189 | End: 2022-06-13
Attending: EMERGENCY MEDICINE | Admitting: STUDENT IN AN ORGANIZED HEALTH CARE EDUCATION/TRAINING PROGRAM
Payer: MEDICARE

## 2022-06-09 ENCOUNTER — APPOINTMENT (EMERGENCY)
Dept: CT IMAGING | Facility: HOSPITAL | Age: 72
DRG: 189 | End: 2022-06-09
Payer: MEDICARE

## 2022-06-09 ENCOUNTER — TELEPHONE (OUTPATIENT)
Dept: OTHER | Facility: OTHER | Age: 72
End: 2022-06-09

## 2022-06-09 DIAGNOSIS — J44.9 CHRONIC OBSTRUCTIVE PULMONARY DISEASE, UNSPECIFIED COPD TYPE (HCC): Primary | ICD-10-CM

## 2022-06-09 DIAGNOSIS — J96.22 ACUTE ON CHRONIC RESPIRATORY FAILURE WITH HYPERCAPNIA (HCC): ICD-10-CM

## 2022-06-09 DIAGNOSIS — J44.1 CHRONIC OBSTRUCTIVE PULMONARY DISEASE WITH ACUTE EXACERBATION (HCC): ICD-10-CM

## 2022-06-09 PROBLEM — R77.8 ELEVATED TROPONIN: Status: ACTIVE | Noted: 2022-06-09

## 2022-06-09 PROBLEM — R93.89 ABNORMAL COMPUTED TOMOGRAPHY ANGIOGRAPHY (CTA): Status: ACTIVE | Noted: 2022-06-09

## 2022-06-09 PROBLEM — J96.21 ACUTE ON CHRONIC RESPIRATORY FAILURE WITH HYPOXIA AND HYPERCAPNIA (HCC): Status: ACTIVE | Noted: 2020-07-15

## 2022-06-09 LAB
2HR DELTA HS TROPONIN: -1 NG/L
4HR DELTA HS TROPONIN: -1 NG/L
ALBUMIN SERPL BCP-MCNC: 4.2 G/DL (ref 3.5–5)
ALP SERPL-CCNC: 55 U/L (ref 34–104)
ALT SERPL W P-5'-P-CCNC: 32 U/L (ref 7–52)
ANION GAP SERPL CALCULATED.3IONS-SCNC: 10 MMOL/L (ref 4–13)
APTT PPP: 34 SECONDS (ref 23–37)
AST SERPL W P-5'-P-CCNC: 53 U/L (ref 13–39)
ATRIAL RATE: 92 BPM
BASE EX.OXY STD BLDV CALC-SCNC: 96.2 % (ref 60–80)
BASE EXCESS BLDV CALC-SCNC: 3.3 MMOL/L
BASOPHILS # BLD MANUAL: 0 THOUSAND/UL (ref 0–0.1)
BASOPHILS NFR MAR MANUAL: 0 % (ref 0–1)
BILIRUB SERPL-MCNC: 0.7 MG/DL (ref 0.2–1)
BNP SERPL-MCNC: 185 PG/ML (ref 0–100)
BUN SERPL-MCNC: 15 MG/DL (ref 5–25)
CALCIUM SERPL-MCNC: 9.2 MG/DL (ref 8.4–10.2)
CARDIAC TROPONIN I PNL SERPL HS: 61 NG/L
CARDIAC TROPONIN I PNL SERPL HS: 61 NG/L
CARDIAC TROPONIN I PNL SERPL HS: 62 NG/L
CHLORIDE SERPL-SCNC: 91 MMOL/L (ref 96–108)
CO2 SERPL-SCNC: 32 MMOL/L (ref 21–32)
CREAT SERPL-MCNC: 0.71 MG/DL (ref 0.6–1.3)
EOSINOPHIL # BLD MANUAL: 0 THOUSAND/UL (ref 0–0.4)
EOSINOPHIL NFR BLD MANUAL: 0 % (ref 0–6)
ERYTHROCYTE [DISTWIDTH] IN BLOOD BY AUTOMATED COUNT: 11.9 % (ref 11.6–15.1)
FLUAV RNA RESP QL NAA+PROBE: NEGATIVE
FLUBV RNA RESP QL NAA+PROBE: NEGATIVE
GFR SERPL CREATININE-BSD FRML MDRD: 93 ML/MIN/1.73SQ M
GLUCOSE SERPL-MCNC: 137 MG/DL (ref 65–140)
HCO3 BLDV-SCNC: 30.4 MMOL/L (ref 24–30)
HCT VFR BLD AUTO: 40 % (ref 36.5–49.3)
HGB BLD-MCNC: 13.3 G/DL (ref 12–17)
INR PPP: 1.08 (ref 0.84–1.19)
LACTATE SERPL-SCNC: 1.5 MMOL/L (ref 0.5–2)
LIPASE SERPL-CCNC: <6 U/L (ref 11–82)
LYMPHOCYTES # BLD AUTO: 0.76 THOUSAND/UL (ref 0.6–4.47)
LYMPHOCYTES # BLD AUTO: 8 % (ref 14–44)
MAGNESIUM SERPL-MCNC: 2.2 MG/DL (ref 1.9–2.7)
MCH RBC QN AUTO: 30.6 PG (ref 26.8–34.3)
MCHC RBC AUTO-ENTMCNC: 33.3 G/DL (ref 31.4–37.4)
MCV RBC AUTO: 92 FL (ref 82–98)
MONOCYTES # BLD AUTO: 1.42 THOUSAND/UL (ref 0–1.22)
MONOCYTES NFR BLD: 15 % (ref 4–12)
NEUTROPHILS # BLD MANUAL: 7.3 THOUSAND/UL (ref 1.85–7.62)
NEUTS BAND NFR BLD MANUAL: 24 % (ref 0–8)
NEUTS SEG NFR BLD AUTO: 53 % (ref 43–75)
O2 CT BLDV-SCNC: 19.1 ML/DL
P AXIS: 87 DEGREES
PCO2 BLDV: 56.8 MM HG (ref 42–50)
PH BLDV: 7.35 [PH] (ref 7.3–7.4)
PHOSPHATE SERPL-MCNC: 3.1 MG/DL (ref 2.3–4.1)
PLATELET # BLD AUTO: 308 THOUSANDS/UL (ref 149–390)
PLATELET BLD QL SMEAR: ADEQUATE
PMV BLD AUTO: 10.9 FL (ref 8.9–12.7)
PO2 BLDV: 88.4 MM HG (ref 35–45)
POTASSIUM SERPL-SCNC: 3.6 MMOL/L (ref 3.5–5.3)
PR INTERVAL: 146 MS
PROCALCITONIN SERPL-MCNC: 0.38 NG/ML
PROT SERPL-MCNC: 7.2 G/DL (ref 6.4–8.4)
PROTHROMBIN TIME: 14 SECONDS (ref 11.6–14.5)
QRS AXIS: 49 DEGREES
QRSD INTERVAL: 76 MS
QT INTERVAL: 330 MS
QTC INTERVAL: 402 MS
RBC # BLD AUTO: 4.34 MILLION/UL (ref 3.88–5.62)
RBC MORPH BLD: NORMAL
RSV RNA RESP QL NAA+PROBE: NEGATIVE
SARS-COV-2 RNA RESP QL NAA+PROBE: NEGATIVE
SODIUM SERPL-SCNC: 133 MMOL/L (ref 135–147)
T WAVE AXIS: 83 DEGREES
VENTRICULAR RATE: 89 BPM
WBC # BLD AUTO: 9.48 THOUSAND/UL (ref 4.31–10.16)

## 2022-06-09 PROCEDURE — 96375 TX/PRO/DX INJ NEW DRUG ADDON: CPT

## 2022-06-09 PROCEDURE — 99223 1ST HOSP IP/OBS HIGH 75: CPT | Performed by: STUDENT IN AN ORGANIZED HEALTH CARE EDUCATION/TRAINING PROGRAM

## 2022-06-09 PROCEDURE — 99285 EMERGENCY DEPT VISIT HI MDM: CPT

## 2022-06-09 PROCEDURE — 1123F ACP DISCUSS/DSCN MKR DOCD: CPT | Performed by: INTERNAL MEDICINE

## 2022-06-09 PROCEDURE — 87040 BLOOD CULTURE FOR BACTERIA: CPT | Performed by: EMERGENCY MEDICINE

## 2022-06-09 PROCEDURE — 36415 COLL VENOUS BLD VENIPUNCTURE: CPT | Performed by: EMERGENCY MEDICINE

## 2022-06-09 PROCEDURE — 94002 VENT MGMT INPAT INIT DAY: CPT

## 2022-06-09 PROCEDURE — 94664 DEMO&/EVAL PT USE INHALER: CPT

## 2022-06-09 PROCEDURE — 83605 ASSAY OF LACTIC ACID: CPT | Performed by: EMERGENCY MEDICINE

## 2022-06-09 PROCEDURE — 99285 EMERGENCY DEPT VISIT HI MDM: CPT | Performed by: EMERGENCY MEDICINE

## 2022-06-09 PROCEDURE — 85610 PROTHROMBIN TIME: CPT | Performed by: EMERGENCY MEDICINE

## 2022-06-09 PROCEDURE — 71275 CT ANGIOGRAPHY CHEST: CPT

## 2022-06-09 PROCEDURE — 82805 BLOOD GASES W/O2 SATURATION: CPT | Performed by: PHYSICIAN ASSISTANT

## 2022-06-09 PROCEDURE — 93005 ELECTROCARDIOGRAM TRACING: CPT

## 2022-06-09 PROCEDURE — 94760 N-INVAS EAR/PLS OXIMETRY 1: CPT

## 2022-06-09 PROCEDURE — 0241U HB NFCT DS VIR RESP RNA 4 TRGT: CPT | Performed by: EMERGENCY MEDICINE

## 2022-06-09 PROCEDURE — 84100 ASSAY OF PHOSPHORUS: CPT | Performed by: EMERGENCY MEDICINE

## 2022-06-09 PROCEDURE — 96365 THER/PROPH/DIAG IV INF INIT: CPT

## 2022-06-09 PROCEDURE — 94644 CONT INHLJ TX 1ST HOUR: CPT

## 2022-06-09 PROCEDURE — 83735 ASSAY OF MAGNESIUM: CPT | Performed by: EMERGENCY MEDICINE

## 2022-06-09 PROCEDURE — 93010 ELECTROCARDIOGRAM REPORT: CPT | Performed by: INTERNAL MEDICINE

## 2022-06-09 PROCEDURE — G1004 CDSM NDSC: HCPCS

## 2022-06-09 PROCEDURE — 80053 COMPREHEN METABOLIC PANEL: CPT | Performed by: EMERGENCY MEDICINE

## 2022-06-09 PROCEDURE — 71045 X-RAY EXAM CHEST 1 VIEW: CPT

## 2022-06-09 PROCEDURE — 94660 CPAP INITIATION&MGMT: CPT

## 2022-06-09 PROCEDURE — 83880 ASSAY OF NATRIURETIC PEPTIDE: CPT | Performed by: EMERGENCY MEDICINE

## 2022-06-09 PROCEDURE — 85027 COMPLETE CBC AUTOMATED: CPT | Performed by: EMERGENCY MEDICINE

## 2022-06-09 PROCEDURE — 84484 ASSAY OF TROPONIN QUANT: CPT | Performed by: EMERGENCY MEDICINE

## 2022-06-09 PROCEDURE — 83690 ASSAY OF LIPASE: CPT | Performed by: EMERGENCY MEDICINE

## 2022-06-09 PROCEDURE — 84145 PROCALCITONIN (PCT): CPT | Performed by: STUDENT IN AN ORGANIZED HEALTH CARE EDUCATION/TRAINING PROGRAM

## 2022-06-09 PROCEDURE — 85730 THROMBOPLASTIN TIME PARTIAL: CPT | Performed by: EMERGENCY MEDICINE

## 2022-06-09 PROCEDURE — 85007 BL SMEAR W/DIFF WBC COUNT: CPT | Performed by: EMERGENCY MEDICINE

## 2022-06-09 RX ORDER — PAROXETINE HYDROCHLORIDE 20 MG/1
10 TABLET, FILM COATED ORAL DAILY
Status: DISCONTINUED | OUTPATIENT
Start: 2022-06-09 | End: 2022-06-13 | Stop reason: HOSPADM

## 2022-06-09 RX ORDER — PANTOPRAZOLE SODIUM 40 MG/1
40 TABLET, DELAYED RELEASE ORAL
Status: DISCONTINUED | OUTPATIENT
Start: 2022-06-09 | End: 2022-06-13 | Stop reason: HOSPADM

## 2022-06-09 RX ORDER — BUSPIRONE HYDROCHLORIDE 5 MG/1
10 TABLET ORAL 2 TIMES DAILY
Status: DISCONTINUED | OUTPATIENT
Start: 2022-06-09 | End: 2022-06-13 | Stop reason: HOSPADM

## 2022-06-09 RX ORDER — DOCUSATE SODIUM 100 MG/1
100 CAPSULE, LIQUID FILLED ORAL 2 TIMES DAILY
Status: DISCONTINUED | OUTPATIENT
Start: 2022-06-09 | End: 2022-06-13 | Stop reason: HOSPADM

## 2022-06-09 RX ORDER — LORAZEPAM 0.5 MG/1
0.5 TABLET ORAL EVERY 8 HOURS PRN
COMMUNITY

## 2022-06-09 RX ORDER — IPRATROPIUM BROMIDE AND ALBUTEROL SULFATE 2.5; .5 MG/3ML; MG/3ML
3 SOLUTION RESPIRATORY (INHALATION) EVERY 6 HOURS PRN
Status: DISCONTINUED | OUTPATIENT
Start: 2022-06-09 | End: 2022-06-12

## 2022-06-09 RX ORDER — SODIUM CHLORIDE FOR INHALATION 0.9 %
12 VIAL, NEBULIZER (ML) INHALATION ONCE
Status: COMPLETED | OUTPATIENT
Start: 2022-06-09 | End: 2022-06-09

## 2022-06-09 RX ORDER — SENNOSIDES 8.6 MG
1 TABLET ORAL DAILY
Status: DISCONTINUED | OUTPATIENT
Start: 2022-06-09 | End: 2022-06-13 | Stop reason: HOSPADM

## 2022-06-09 RX ORDER — DIPHENHYDRAMINE HCL 25 MG
25 TABLET ORAL EVERY 6 HOURS PRN
COMMUNITY

## 2022-06-09 RX ORDER — AZITHROMYCIN 250 MG/1
500 TABLET, FILM COATED ORAL EVERY 24 HOURS
Status: COMPLETED | OUTPATIENT
Start: 2022-06-09 | End: 2022-06-11

## 2022-06-09 RX ORDER — ACETAMINOPHEN 325 MG/1
650 TABLET ORAL EVERY 6 HOURS PRN
Status: DISCONTINUED | OUTPATIENT
Start: 2022-06-09 | End: 2022-06-13 | Stop reason: HOSPADM

## 2022-06-09 RX ORDER — BENZONATATE 100 MG/1
100 CAPSULE ORAL 3 TIMES DAILY PRN
Status: DISCONTINUED | OUTPATIENT
Start: 2022-06-09 | End: 2022-06-13 | Stop reason: HOSPADM

## 2022-06-09 RX ORDER — LAMOTRIGINE 100 MG/1
50 TABLET ORAL DAILY
Status: DISCONTINUED | OUTPATIENT
Start: 2022-06-09 | End: 2022-06-13 | Stop reason: HOSPADM

## 2022-06-09 RX ORDER — METHYLPREDNISOLONE SODIUM SUCCINATE 125 MG/2ML
125 INJECTION, POWDER, LYOPHILIZED, FOR SOLUTION INTRAMUSCULAR; INTRAVENOUS ONCE
Status: COMPLETED | OUTPATIENT
Start: 2022-06-09 | End: 2022-06-09

## 2022-06-09 RX ORDER — FUROSEMIDE 40 MG/1
40 TABLET ORAL DAILY
COMMUNITY

## 2022-06-09 RX ORDER — POTASSIUM CHLORIDE 20 MEQ/1
20 TABLET, EXTENDED RELEASE ORAL DAILY
COMMUNITY

## 2022-06-09 RX ORDER — CALCIUM CARBONATE 200(500)MG
500 TABLET,CHEWABLE ORAL EVERY 6 HOURS PRN
Status: DISCONTINUED | OUTPATIENT
Start: 2022-06-09 | End: 2022-06-13 | Stop reason: HOSPADM

## 2022-06-09 RX ORDER — METHYLPREDNISOLONE SODIUM SUCCINATE 40 MG/ML
40 INJECTION, POWDER, LYOPHILIZED, FOR SOLUTION INTRAMUSCULAR; INTRAVENOUS EVERY 12 HOURS SCHEDULED
Status: DISCONTINUED | OUTPATIENT
Start: 2022-06-10 | End: 2022-06-12

## 2022-06-09 RX ORDER — GUAIFENESIN 600 MG
1200 TABLET, EXTENDED RELEASE 12 HR ORAL EVERY 12 HOURS SCHEDULED
Status: DISCONTINUED | OUTPATIENT
Start: 2022-06-09 | End: 2022-06-13 | Stop reason: HOSPADM

## 2022-06-09 RX ORDER — HEPARIN SODIUM 5000 [USP'U]/ML
5000 INJECTION, SOLUTION INTRAVENOUS; SUBCUTANEOUS EVERY 8 HOURS SCHEDULED
Status: DISCONTINUED | OUTPATIENT
Start: 2022-06-09 | End: 2022-06-13 | Stop reason: HOSPADM

## 2022-06-09 RX ORDER — KETOCONAZOLE 20 MG/G
CREAM TOPICAL EVERY 12 HOURS
COMMUNITY

## 2022-06-09 RX ADMIN — GUAIFENESIN 1200 MG: 600 TABLET ORAL at 21:28

## 2022-06-09 RX ADMIN — ALBUTEROL SULFATE 10 MG: 2.5 SOLUTION RESPIRATORY (INHALATION) at 06:11

## 2022-06-09 RX ADMIN — METHYLPREDNISOLONE SODIUM SUCCINATE 125 MG: 125 INJECTION, POWDER, FOR SOLUTION INTRAMUSCULAR; INTRAVENOUS at 06:00

## 2022-06-09 RX ADMIN — SENNOSIDES 8.6 MG: 8.6 TABLET, FILM COATED ORAL at 12:38

## 2022-06-09 RX ADMIN — ISODIUM CHLORIDE 12 ML: 0.03 SOLUTION RESPIRATORY (INHALATION) at 06:11

## 2022-06-09 RX ADMIN — HEPARIN SODIUM 5000 UNITS: 5000 INJECTION INTRAVENOUS; SUBCUTANEOUS at 13:44

## 2022-06-09 RX ADMIN — DOCUSATE SODIUM 100 MG: 100 CAPSULE, LIQUID FILLED ORAL at 17:25

## 2022-06-09 RX ADMIN — AZITHROMYCIN MONOHYDRATE 500 MG: 250 TABLET ORAL at 12:41

## 2022-06-09 RX ADMIN — DOCUSATE SODIUM 100 MG: 100 CAPSULE, LIQUID FILLED ORAL at 12:41

## 2022-06-09 RX ADMIN — LAMOTRIGINE 50 MG: 100 TABLET ORAL at 12:43

## 2022-06-09 RX ADMIN — PAROXETINE 10 MG: 20 TABLET, FILM COATED ORAL at 12:38

## 2022-06-09 RX ADMIN — IPRATROPIUM BROMIDE 1 MG: 0.5 SOLUTION RESPIRATORY (INHALATION) at 06:11

## 2022-06-09 RX ADMIN — BUSPIRONE HYDROCHLORIDE 10 MG: 5 TABLET ORAL at 12:43

## 2022-06-09 RX ADMIN — HEPARIN SODIUM 5000 UNITS: 5000 INJECTION INTRAVENOUS; SUBCUTANEOUS at 21:28

## 2022-06-09 RX ADMIN — BUSPIRONE HYDROCHLORIDE 10 MG: 5 TABLET ORAL at 17:25

## 2022-06-09 RX ADMIN — PANTOPRAZOLE SODIUM 40 MG: 40 TABLET, DELAYED RELEASE ORAL at 12:42

## 2022-06-09 RX ADMIN — CEFTRIAXONE SODIUM 1000 MG: 10 INJECTION, POWDER, FOR SOLUTION INTRAVENOUS at 06:49

## 2022-06-09 RX ADMIN — IOHEXOL 65 ML: 350 INJECTION, SOLUTION INTRAVENOUS at 07:34

## 2022-06-09 RX ADMIN — GUAIFENESIN 1200 MG: 600 TABLET ORAL at 12:46

## 2022-06-09 NOTE — PLAN OF CARE
Problem: Potential for Falls  Goal: Patient will remain free of falls  Description: INTERVENTIONS:  - Educate patient/family on patient safety including physical limitations  - Instruct patient to call for assistance with activity   - Consult OT/PT to assist with strengthening/mobility   - Keep Call bell within reach  - Keep bed low and locked with side rails adjusted as appropriate  - Keep care items and personal belongings within reach  - Initiate and maintain comfort rounds  - Make Fall Risk Sign visible to staff  - Offer Toileting every 2 Hours, in advance of need  - Initiate/Maintain bed alarm  - Apply yellow socks and bracelet for high fall risk patients  - Consider moving patient to room near nurses station  Outcome: Progressing     Problem: MOBILITY - ADULT  Goal: Maintain or return to baseline ADL function  Description: INTERVENTIONS:  -  Assess patient's ability to carry out ADLs; assess patient's baseline for ADL function and identify physical deficits which impact ability to perform ADLs (bathing, care of mouth/teeth, toileting, grooming, dressing, etc )  - Assess/evaluate cause of self-care deficits   - Assess range of motion  - Assess patient's mobility; develop plan if impaired  - Assess patient's need for assistive devices and provide as appropriate  - Encourage maximum independence but intervene and supervise when necessary  - Involve family in performance of ADLs  - Assess for home care needs following discharge   - Consider OT consult to assist with ADL evaluation and planning for discharge  - Provide patient education as appropriate  Outcome: Progressing     Problem: Prexisting or High Potential for Compromised Skin Integrity  Goal: Skin integrity is maintained or improved  Description: INTERVENTIONS:  - Identify patients at risk for skin breakdown  - Assess and monitor skin integrity  - Assess and monitor nutrition and hydration status  - Monitor labs   - Assess for incontinence   - Turn and reposition patient  - Assist with mobility/ambulation  - Relieve pressure over bony prominences  - Avoid friction and shearing  - Provide appropriate hygiene as needed including keeping skin clean and dry  - Evaluate need for skin moisturizer/barrier cream  - Collaborate with interdisciplinary team   - Patient/family teaching  - Consider wound care consult   Outcome: Progressing

## 2022-06-09 NOTE — TELEPHONE ENCOUNTER
[phone]/[facility] is calling from [place]  Pt had change of mental status and Oxy Saturation  Pt was sent out to [facility]  Denny Solid was paged out via TC    Please allow the on-call provider 20-30 mins to respond  If you have not heard from them within that time, please feel free to call back

## 2022-06-09 NOTE — ED NOTES
Attempted to call RN and Charge on [address] 2 but continuously rings  Tiger texting to attempt contact           Milton Hunterch, GERARDO  06/09/22 2129

## 2022-06-09 NOTE — RESPIRATORY THERAPY NOTE
Pt6 transported to room 202  Taken off bipap and placed on N/C 4 lpm  Pt awake and alert and appears to be tolerating N/C well   Will leave off bipap and cont to monitor

## 2022-06-09 NOTE — ED NOTES
Spoke to [facility] who reports that Keven Barrientos is RN and to Any.DO and then give more bedside report if needed  Done and RN made aware of impending transfer upstairs        Mark Haines RN  06/09/22 7727

## 2022-06-09 NOTE — ASSESSMENT & PLAN NOTE
· Non chest pain associated elevation in troponin  · In setting of respiratory failure  · No further intervention for now

## 2022-06-09 NOTE — ASSESSMENT & PLAN NOTE
· Present on admission with cough, shortness of breath, respiratory failure  · On BiPAP in ED, but weaned down now to 2-3 liters  · Decreased lung sounds diffusely, continue IV steroids and duonebz for now  · Monitor oxygen status, rule out infection  · Mental status improving back to baseline

## 2022-06-09 NOTE — ASSESSMENT & PLAN NOTE
· Seen on VBG  · Improving with supplemental oxygen and plan above  · Monitor, currently at baseline mental status

## 2022-06-09 NOTE — H&P
[facility]  H&P- Angela Nogueira 1950, 66 y o  male MRN: [id number]  Unit/Bed#: ROMAN Encounter: [id number]  Primary Care Provider: Viviane Dubon MD   Date and time admitted to hospital: 6/9/2022  5:20 AM    * Chronic obstructive pulmonary disease with acute exacerbation ([facility] [address] )  Assessment & Plan  · Present on admission with cough, shortness of breath, respiratory failure  · On BiPAP in ED, but weaned down now to 2-3 liters  · Decreased lung sounds diffusely, continue IV steroids and duonebz for now  · Monitor oxygen status, rule out infection  · Mental status improving back to baseline    Acute on chronic respiratory failure with hypoxia and hypercapnia (HCC)  Assessment & Plan  · Seen on VBG  · Improving with supplemental oxygen and plan above  · Monitor, currently at baseline mental status     Elevated troponin  Assessment & Plan  · Non chest pain associated elevation in troponin  · In setting of respiratory failure  · No further intervention for now    Abnormal computed tomography angiography (CTA)  Assessment & Plan  · Noted to have cholelithiasis   · Follow up as an outpatient  · Currently asymptomatic     Gastroesophageal reflux disease without esophagitis  Assessment & Plan  · Continue PPI and tums PRN    VTE Pharmacologic Prophylaxis: VTE Score: 5 High Risk (Score >/= 5) - Pharmacological DVT Prophylaxis Ordered: heparin  Sequential Compression Devices Ordered  Code Status: Level 1 - Full Code   Discussion with family: Patient declined call to   Anticipated Length of Stay: Patient will be admitted on an inpatient basis with an anticipated length of stay of greater than 2 midnights secondary to respiratory failure  Total Time for Visit, including Counseling / Coordination of Care: 30 minutes Greater than 50% of this total time spent on direct patient counseling and coordination of care      Chief Complaint: shortness of breath     History of Present Illness:  Zoe Haq is a 66 y o  male with a PMH of COPD who presents with shortness of breath  Patient is lethargic and unable to provide full history  Per ED reported, presented with shortness of breath and change in mental status while walking to the bathroom at [facility]  They checked his oxygen saturations which were noted to be in the 60's  He was brought to the ED requiring BiPAP and ICU evaluation  Stabilized in ED but admitted to medicine for further management  Review of Systems:  Review of Systems   Constitutional: Positive for activity change, appetite change and fatigue  Negative for chills and fever  HENT: Negative for ear pain and sore throat  Eyes: Negative for pain and visual disturbance  Respiratory: Positive for cough and shortness of breath  Cardiovascular: Negative for chest pain and palpitations  Gastrointestinal: Negative for abdominal pain and vomiting  Genitourinary: Negative for dysuria and hematuria  Musculoskeletal: Negative for arthralgias and back pain  Skin: Negative for color change and rash  Neurological: Negative for seizures and syncope  Psychiatric/Behavioral: Positive for confusion  All other systems reviewed and are negative  Past Medical and Surgical History:   Past Medical History:   Diagnosis Date    Acute and chronic respiratory failure with hypoxia (HCC)     Asthma     CHF (congestive heart failure) (HCC)     COPD (chronic obstructive pulmonary disease) (HCC)     Delirium     Hypokalemia     Hyponatremia        History reviewed  No pertinent surgical history  Meds/Allergies:  Prior to Admission medications    Medication Sig Start Date End Date Taking?  Authorizing Provider   acetaminophen (TYLENOL) 325 mg tablet Take 650 mg by mouth every 6 (six) hours as needed   Yes Historical Provider, MD   albuterol (2 5 mg/3 mL) 0 083 % nebulizer solution Take 2 5 mg by nebulization every 6 (six) hours as needed 2 puffs every 6 hours PRN   Yes Historical Provider, MD   benzonatate (TESSALON PERLES) 100 mg capsule Take 100 mg by mouth 3 (three) times a day as needed   Yes Historical Provider, MD   busPIRone (BUSPAR) 10 mg tablet Take 10 mg by mouth 2 (two) times a day   Yes Historical Provider, MD   calcium carbonate (TUMS) 500 mg chewable tablet Chew 1 tablet every 6 (six) hours as needed   Yes Historical Provider, MD   diphenhydrAMINE (BENADRYL) 25 mg tablet Take 25 mg by mouth every 6 (six) hours as needed for allergies   Yes Historical Provider, MD   ergocalciferol (ERGOCALCIFEROL) 1 25 MG (33502 UT) capsule Take 50,000 Units by mouth once a week   Yes Historical Provider, MD   fluticasone-umeclidinium-vilanterol (TRELEGY) 100-62 5-25 MCG/INH inhaler Inhale 1 puff daily Rinse mouth after use     Yes Historical Provider, MD   furosemide (LASIX) 40 mg tablet Take 40 mg by mouth daily   Yes Historical Provider, MD   guaiFENesin (MUCINEX) 600 mg 12 hr tablet Take 1,200 mg by mouth every 12 (twelve) hours   Yes Historical Provider, MD   ipratropium-albuterol (DUO-NEB) 0 5-2 5 mg/3 mL nebulizer solution Take 3 mL by nebulization every 6 (six) hours as needed for shortness of breath or wheezing   Yes Historical Provider, MD   ketoconazole (NIZORAL) 2 % cream Apply topically every 12 (twelve) hours Apply to face topically for rash; use for 4 weeks (started 3/02/2022)   Yes Historical Provider, MD   lamoTRIgine (LaMICtal) 25 mg tablet Take 50 mg by mouth daily   Yes Historical Provider, MD   LORazepam (ATIVAN) 0 5 mg tablet Take 0 5 mg by mouth every 8 (eight) hours as needed for anxiety   Yes Historical Provider, MD   magnesium hydroxide (MILK OF MAGNESIA) 400 mg/5 mL oral suspension Take by mouth daily as needed for constipation   Yes Historical Provider, MD   Morphine Sulfate, Concentrate, 20 mg/mL concentrated solution Take 0 25 mL (5 mg total) by mouth every 6 (six) hours as needed for severe painMax Daily Amount: 20 mg 2/6/21  Yes Guerrero Alegria Car May MD   omeprazole (PriLOSEC) 20 mg delayed release capsule Take 20 mg by mouth 2 (two) times a day   Yes Historical Provider, MD   PARoxetine (PAXIL) 20 mg tablet Take 10 mg by mouth daily   Yes Historical Provider, MD   potassium chloride (K-DUR,KLOR-CON) 20 mEq tablet Take 20 mEq by mouth daily   Yes Historical Provider, MD   predniSONE 5 mg tablet Take 5 mg by mouth daily   Yes Historical Provider, MD   Protein (PROSOURCE PO) Take 30 mL by mouth in the morning   Yes Historical Provider, MD   sodium chloride (OCEAN) 0 65 % nasal spray 1 spray into each nostril every 12 (twelve) hours as needed   Yes Historical Provider, MD     I have reviewed home medications using recent Epic encounter  Allergies: Allergies   Allergen Reactions    Other Allergic Rhinitis     Seasonal Allergies    Penicillins        Social History:  Marital Status: Single   Occupation: na  Patient Pre-hospital Living Situation: Assisted Living  Patient Pre-hospital Level of Mobility: unable to be assessed at time of evaluation  Patient Pre-hospital Diet Restrictions: na  Substance Use History:   Social History     Substance and Sexual Activity   Alcohol Use Not Currently    Comment: former social drinker     Social History     Tobacco Use   Smoking Status Former Smoker    Packs/day: 2 00    Types: Cigarettes    Quit date:     Years since quittin 4   Smokeless Tobacco Former User    Types: Chew     Social History     Substance and Sexual Activity   Drug Use Not Currently    Types: Marijuana    Comment: twice         Family History:  Family History   Problem Relation Age of Onset    Brain cancer Mother     Heart disease Father        Physical Exam:     Vitals:   Blood Pressure: 100/64 (22 1030)  Pulse: 80 (22 1030)  Temperature: 98 9 °F (37 2 °C) (22)  Temp Source: Oral (22)  Respirations: (!) 24 (22 1030)  Height: 6' (182 9 cm) (22)  Weight - Scale: 68 2 kg (150 lb 5 7 oz) (06/09/22 0529)  SpO2: 96 % (06/09/22 1030)    Physical Exam  Constitutional:       General: He is not in acute distress  Appearance: Normal appearance  He is ill-appearing  He is not toxic-appearing  Comments: Lethargic, Cachectic   Cardiovascular:      Rate and Rhythm: Normal rate and regular rhythm  Heart sounds: Normal heart sounds  No murmur heard  Pulmonary:      Effort: Pulmonary effort is normal  No respiratory distress  Breath sounds: Normal breath sounds  No wheezing  Comments: Decreased breath sounds diffusely   Abdominal:      General: Abdomen is flat  There is no distension  Palpations: Abdomen is soft  Tenderness: There is no abdominal tenderness  Neurological:      General: No focal deficit present  Mental Status: He is oriented to person, place, and time  Mental status is at baseline  Motor: No weakness  Additional Data:     Lab Results:  Results from last 7 days   Lab Units 06/09/22  0538   WBC Thousand/uL 9 48   HEMOGLOBIN g/dL 13 3   HEMATOCRIT % 40 0   PLATELETS Thousands/uL 308   BANDS PCT % 24*   LYMPHO PCT % 8*   MONO PCT % 15*   EOS PCT % 0     Results from last 7 days   Lab Units 06/09/22  0538   SODIUM mmol/L 133*   POTASSIUM mmol/L 3 6   CHLORIDE mmol/L 91*   CO2 mmol/L 32   BUN mg/dL 15   CREATININE mg/dL 0 71   ANION GAP mmol/L 10   CALCIUM mg/dL 9 2   ALBUMIN g/dL 4 2   TOTAL BILIRUBIN mg/dL 0 70   ALK PHOS U/L 55   ALT U/L 32   AST U/L 53*   GLUCOSE RANDOM mg/dL 137     Results from last 7 days   Lab Units 06/09/22  0538   INR  1 08             Results from last 7 days   Lab Units 06/09/22  0538   LACTIC ACID mmol/L 1 5       Imaging: Reviewed radiology reports from this admission including: CTA chest  CTA ED chest PE Study   Final Result by Halley Hoskins MD (06/09 1358)      No evidence of pulmonary embolus  Severe emphysema  Cholelithiasis        Workstation performed: [id number]         XR chest 1 view portable   Final Result by Artem Davidson DO (06/09 7871)      COPD  Clear lungs  Workstation performed: [id number]             EKG and Other Studies Reviewed on Admission:   · EKG: No EKG obtained  ** Please Note: This note has been constructed using a voice recognition system   **

## 2022-06-09 NOTE — ED PROVIDER NOTES
History  Chief Complaint   Patient presents with    Altered Mental Status     Pt from [facility], hx COPD/CHF/disorientation; Noted to be "not himself/disoriented/increased fall risk" while walking to bathroom; POX 65% - placed on 8 L NC; EMS noted initial  - currently 89; A&O x 3 and denies complaints at this time     HPI     Patient is a 66 yom who presents with shortness of breath  Noted to be hypoxic in the 60s for at least 45 minutes at her assisted living facility  He was on 8L NC at that time  No focal neuro symptoms  No chest pain  No vomiting  Now feeling better than prior but with mild resp distress  Tachypnea, rales and crackles  No abdominal pain or tenderness  MDM pleasant 66 yom concern for multifactorial cause of symptoms, prolonged hypoxia at nursing home  Possible pna/chf/copd  REVIEW OF SYSTEMS  Positive for shortness of breath  All other systems reviewed and are negative unless noted in this section or otherwise in the chart  Physical Exam  Vitals and nursing note reviewed  Constitutional:    Appearance:  Patient is well-developed  No diaphoresis  HENT:   Head: Normocephalic and atraumatic  Right Ear: External ear normal    Left Ear: External ear normal    Nose: No congestion  Eyes:   Conjunctiva/sclera: Conjunctivae normal    Right eye: No discharge  Left eye: No discharge  Extraocular Movements: Extraocular movements intact  Neck:   Vascular: No JVD  Trachea: No tracheal deviation  Cardiovascular:   Rate and Rhythm: Normal rate and regular rhythm  Heart sounds: Normal heart sounds  Pulmonary:   Effort: Pulmonary effort is increased  mild respiratory distress  Breath sounds: + wheezing or rales  Abdominal:   Palpations: Abdomen is soft  Tenderness: There is no abdominal tenderness  There is no guarding or rebound  Musculoskeletal:      General: No tenderness  Cervical back: Normal range of motion and neck supple  Skin:  General: Skin is warm and dry  Findings: No erythema or rash  Neurological:   General: No focal deficit present  Mental Status: Alert and oriented to person, place, and time  Motor: No weakness  Psychiatric:      Behavior: Behavior normal       Thought Content: Thought content normal                            Prior to Admission Medications   Prescriptions Last Dose Informant Patient Reported? Taking?    LORazepam (ATIVAN) 0 5 mg tablet 6/9/2022 at 0251  Yes Yes   Sig: Take 0 5 mg by mouth every 8 (eight) hours as needed for anxiety   Morphine Sulfate, Concentrate, 20 mg/mL concentrated solution 6/8/2022 at 0111  No Yes   Sig: Take 0 25 mL (5 mg total) by mouth every 6 (six) hours as needed for severe painMax Daily Amount: 20 mg   PARoxetine (PAXIL) 20 mg tablet 6/8/2022 at Unknown time  Yes Yes   Sig: Take 10 mg by mouth daily   Protein (PROSOURCE PO) 6/8/2022 at Unknown time  Yes Yes   Sig: Take 30 mL by mouth in the morning   acetaminophen (TYLENOL) 325 mg tablet   Yes Yes   Sig: Take 650 mg by mouth every 6 (six) hours as needed   albuterol (2 5 mg/3 mL) 0 083 % nebulizer solution 6/9/2022 at 0130  Yes Yes   Sig: Take 2 5 mg by nebulization every 6 (six) hours as needed 2 puffs every 6 hours PRN   benzonatate (TESSALON PERLES) 100 mg capsule   Yes Yes   Sig: Take 100 mg by mouth 3 (three) times a day as needed   busPIRone (BUSPAR) 10 mg tablet 6/8/2022 at Unknown time  Yes Yes   Sig: Take 10 mg by mouth 2 (two) times a day   calcium carbonate (TUMS) 500 mg chewable tablet Past Week at Unknown time  Yes Yes   Sig: Chew 1 tablet every 6 (six) hours as needed   diphenhydrAMINE (BENADRYL) 25 mg tablet 6/8/2022 at 2243  Yes Yes   Sig: Take 25 mg by mouth every 6 (six) hours as needed for allergies   ergocalciferol (ERGOCALCIFEROL) 1 25 MG (75237 UT) capsule Past Week at Unknown time  Yes Yes   Sig: Take 50,000 Units by mouth once a week   fluticasone-umeclidinium-vilanterol (Marissa Mu) 100-62 5-25 MCG/INH inhaler 2022 at Unknown time  Yes Yes   Sig: Inhale 1 puff daily Rinse mouth after use  furosemide (LASIX) 40 mg tablet 2022 at Unknown time  Yes Yes   Sig: Take 40 mg by mouth daily   guaiFENesin (MUCINEX) 600 mg 12 hr tablet 2022 at Unknown time  Yes Yes   Sig: Take 1,200 mg by mouth every 12 (twelve) hours   ipratropium-albuterol (DUO-NEB) 0 5-2 5 mg/3 mL nebulizer solution 2022 at 2220  Yes Yes   Sig: Take 3 mL by nebulization every 6 (six) hours as needed for shortness of breath or wheezing   ketoconazole (NIZORAL) 2 % cream 2022 at Unknown time  Yes Yes   Sig: Apply topically every 12 (twelve) hours Apply to face topically for rash; use for 4 weeks (started 3/02/2022)   lamoTRIgine (LaMICtal) 25 mg tablet 2022 at Unknown time  Yes Yes   Sig: Take 50 mg by mouth daily   magnesium hydroxide (MILK OF MAGNESIA) 400 mg/5 mL oral suspension   Yes Yes   Sig: Take by mouth daily as needed for constipation   omeprazole (PriLOSEC) 20 mg delayed release capsule 2022 at Unknown time  Yes Yes   Sig: Take 20 mg by mouth 2 (two) times a day   potassium chloride (K-DUR,KLOR-CON) 20 mEq tablet 2022 at Unknown time  Yes Yes   Sig: Take 20 mEq by mouth daily   predniSONE 5 mg tablet 2022 at Unknown time  Yes Yes   Sig: Take 5 mg by mouth daily   sodium chloride (OCEAN) 0 65 % nasal spray   Yes Yes   Si spray into each nostril every 12 (twelve) hours as needed      Facility-Administered Medications: None       Past Medical History:   Diagnosis Date    Acute and chronic respiratory failure with hypoxia (HCC)     Asthma     CHF (congestive heart failure) (HCC)     COPD (chronic obstructive pulmonary disease) (HCC)     Delirium     Hypokalemia     Hyponatremia        History reviewed  No pertinent surgical history      Family History   Problem Relation Age of Onset    Brain cancer Mother     Heart disease Father      I have reviewed and agree with the history as documented  E-Cigarette/Vaping    E-Cigarette Use Former User      E-Cigarette/Vaping Substances     Social History     Tobacco Use    Smoking status: Former Smoker     Packs/day: 2 00     Types: Cigarettes     Quit date: 2018     Years since quittin 4    Smokeless tobacco: Former User     Types: Chew   Vaping Use    Vaping Use: Former   Substance Use Topics    Alcohol use: Not Currently     Comment: former social drinker    Drug use: Not Currently     Types: Marijuana     Comment: twice         Review of Systems    Physical Exam  Physical Exam    Vital Signs  ED Triage Vitals   Temperature Pulse Respirations Blood Pressure SpO2   22 0529 22 0529 22 0529 22 0529 22 0529   98 9 °F (37 2 °C) 89 (!) 34 153/78 98 %      Temp Source Heart Rate Source Patient Position - Orthostatic VS BP Location FiO2 (%)   22 0529 22 0529 22 0529 22 0529 22 0630   Oral Monitor Sitting Right arm 40      Pain Score       22 0529       No Pain           Vitals:    22 1112 22 1535 22 1951 22 2220   BP: 127/74 113/76 131/75 142/89   Pulse: 64 64 65 66   Patient Position - Orthostatic VS:             Visual Acuity  Visual Acuity    Flowsheet Row Most Recent Value   L Pupil Size (mm) 4   R Pupil Size (mm) 4   L Pupil Shape Round   R Pupil Shape Round          ED Medications  Medications   busPIRone (BUSPAR) tablet 10 mg (10 mg Oral Given 22)   benzonatate (TESSALON PERLES) capsule 100 mg (100 mg Oral Given 22)   lamoTRIgine (LaMICtal) tablet 50 mg (50 mg Oral Given 22)   ipratropium-albuterol (DUO-NEB) 0 5-2 5 mg/3 mL inhalation solution 3 mL (3 mL Nebulization Given 6/10/22 2116)   guaiFENesin (MUCINEX) 12 hr tablet 1,200 mg (1,200 mg Oral Given 22)   calcium carbonate (TUMS) chewable tablet 500 mg (has no administration in time range)   pantoprazole (PROTONIX) EC tablet 40 mg (40 mg Oral Given 22 6694)   PARoxetine (PAXIL) tablet 10 mg (10 mg Oral Given 6/11/22 0917)   acetaminophen (TYLENOL) tablet 650 mg (has no administration in time range)   docusate sodium (COLACE) capsule 100 mg (100 mg Oral Refused 6/11/22 1737)   senna (SENOKOT) tablet 8 6 mg (8 6 mg Oral Given 6/11/22 0916)   heparin (porcine) subcutaneous injection 5,000 Units (5,000 Units Subcutaneous Given 6/11/22 2139)   methylPREDNISolone sodium succinate (Solu-MEDROL) injection 40 mg (40 mg Intravenous Given 6/11/22 2139)   albuterol (PROVENTIL HFA,VENTOLIN HFA) inhaler 2 puff (2 puffs Inhalation Given 6/11/22 2139)   albuterol inhalation solution 10 mg (10 mg Nebulization Given 6/9/22 0611)   ipratropium (ATROVENT) 0 02 % inhalation solution 1 mg (1 mg Nebulization Given 6/9/22 0611)   sodium chloride 0 9 % inhalation solution 12 mL (12 mL Nebulization Given 6/9/22 0611)   methylPREDNISolone sodium succinate (Solu-MEDROL) injection 125 mg (125 mg Intravenous Given 6/9/22 0600)   ceftriaxone (ROCEPHIN) 1 g/50 mL in dextrose IVPB (0 mg Intravenous Stopped 6/9/22 0739)   iohexol (OMNIPAQUE) 350 MG/ML injection (MULTI-DOSE) 65 mL (65 mL Intravenous Given 6/9/22 0734)   azithromycin (ZITHROMAX) tablet 500 mg (500 mg Oral Given 6/11/22 1202)       Diagnostic Studies  Results Reviewed     Procedure Component Value Units Date/Time    Blood culture #1 [[id number]] Collected: 06/09/22 0535    Lab Status: Preliminary result Specimen: Blood from Arm, Left Updated: 06/11/22 1003     Blood Culture No Growth at 48 hrs  Blood culture #2 [[id number]] Collected: 06/09/22 0536    Lab Status: Preliminary result Specimen: Blood from Arm, Left Updated: 06/11/22 1003     Blood Culture No Growth at 48 hrs      Basic metabolic panel [[id number]]  (Abnormal) Collected: 06/10/22 0431    Lab Status: Final result Specimen: Blood from Arm, Right Updated: 06/10/22 0534     Sodium 135 mmol/L      Potassium 4 1 mmol/L      Chloride 93 mmol/L      CO2 32 mmol/L      ANION GAP 10 mmol/L      BUN 25 mg/dL      Creatinine 0 91 mg/dL      Glucose 106 mg/dL      Calcium 9 4 mg/dL      eGFR 83 ml/min/1 73sq m     Narrative:      [place] guidelines for Chronic Kidney Disease (CKD):     Stage 1 with normal or high GFR (GFR > 90 mL/min/1 73 square meters)    Stage 2 Mild CKD (GFR = 60-89 mL/min/1 73 square meters)    Stage 3A Moderate CKD (GFR = 45-59 mL/min/1 73 square meters)    Stage 3B Moderate CKD (GFR = 30-44 mL/min/1 73 square meters)    Stage 4 Severe CKD (GFR = 15-29 mL/min/1 73 square meters)    Stage 5 End Stage CKD (GFR <15 mL/min/1 73 square meters)  Note: GFR calculation is accurate only with a steady state creatinine    Magnesium [[id number]]  (Normal) Collected: 06/10/22 0431    Lab Status: Final result Specimen: Blood from Arm, Right Updated: 06/10/22 0534     Magnesium 2 5 mg/dL     CBC (With Platelets) [[id number]]  (Normal) Collected: 06/10/22 0431    Lab Status: Final result Specimen: Blood from Arm, Right Updated: 06/10/22 0505     WBC 9 18 Thousand/uL      RBC 4 25 Million/uL      Hemoglobin 12 9 g/dL      Hematocrit 38 7 %      MCV 91 fL      MCH 30 4 pg      MCHC 33 3 g/dL      RDW 11 9 %      Platelets 310 Thousands/uL      MPV 10 9 fL     Procalcitonin [[id number]]  (Abnormal) Collected: 06/09/22 0538    Lab Status: Final result Specimen: Blood from Arm, Left Updated: 06/09/22 1153     Procalcitonin 0 38 ng/ml     HS Troponin I 4hr [[id number]]  (Abnormal) Collected: 06/09/22 0935    Lab Status: Final result Specimen: Blood from Arm, Left Updated: 06/09/22 1008     hs TnI 4hr 61 ng/L      Delta 4hr hsTnI -1 ng/L     HS Troponin I 2hr [[id number]]  (Abnormal) Collected: 06/09/22 0739    Lab Status: Final result Specimen: Blood from Arm, Left Updated: 06/09/22 0820     hs TnI 2hr 61 ng/L      Delta 2hr hsTnI -1 ng/L     Blood gas, venous [[id number]]  (Abnormal) Collected: 06/09/22 0648    Lab Status: Final result Specimen: Blood from Arm, Left Updated: 06/09/22 0704     pH, Sergei 7 346     pCO2, Sergei 56 8 mm Hg      pO2, Sergei 88 4 mm Hg      HCO3, Sergei 30 4 mmol/L      Base Excess, Sergei 3 3 mmol/L      O2 Content, Sergei 19 1 ml/dL      O2 HGB, VENOUS 96 2 %     CBC and differential [[id number]]  (Normal) Collected: 06/09/22 0538    Lab Status: Final result Specimen: Blood from Arm, Left Updated: 06/09/22 0639     WBC 9 48 Thousand/uL      RBC 4 34 Million/uL      Hemoglobin 13 3 g/dL      Hematocrit 40 0 %      MCV 92 fL      MCH 30 6 pg      MCHC 33 3 g/dL      RDW 11 9 %      MPV 10 9 fL      Platelets 041 Thousands/uL     Narrative: This is an appended report  These results have been appended to a previously verified report  Manual Differential(PHLEBS Do Not Order) [[id number]]  (Abnormal) Collected: 06/09/22 0538    Lab Status: Final result Specimen: Blood from Arm, Left Updated: 06/09/22 0639     Segmented % 53 %      Bands % 24 %      Lymphocytes % 8 %      Monocytes % 15 %      Eosinophils, % 0 %      Basophils % 0 %      Absolute Neutrophils 7 30 Thousand/uL      Lymphocytes Absolute 0 76 Thousand/uL      Monocytes Absolute 1 42 Thousand/uL      Eosinophils Absolute 0 00 Thousand/uL      Basophils Absolute 0 00 Thousand/uL      Total Counted --     RBC Morphology Normal     Platelet Estimate Adequate    Lactic acid [[id number]]  (Normal) Collected: 06/09/22 0538    Lab Status: Final result Specimen: Blood from Arm, Left Updated: 06/09/22 0636     LACTIC ACID 1 5 mmol/L     Narrative:      Result may be elevated if tourniquet was used during collection      HS Troponin 0hr (reflex protocol) [[id number]]  (Abnormal) Collected: 06/09/22 0538    Lab Status: Final result Specimen: Blood from Arm, Left Updated: 06/09/22 0628     hs TnI 0hr 62 ng/L     COVID/FLU/RSV - 2 hour TAT [[id number]]  (Normal) Collected: 06/09/22 0538    Lab Status: Final result Specimen: Nares from Nose Updated: 06/09/22 0628     SARS-CoV-2 Negative     INFLUENZA A PCR Negative INFLUENZA B PCR Negative     RSV PCR Negative    Narrative:      FOR PEDIATRIC PATIENTS - copy/paste COVID Guidelines URL to browser: [email or web address]    SARS-CoV-2 assay is a Nucleic Acid Amplification assay intended for the  qualitative detection of nucleic acid from SARS-CoV-2 in nasopharyngeal  swabs  Results are for the presumptive identification of SARS-CoV-2 RNA  Positive results are indicative of infection with SARS-CoV-2, the virus  causing COVID-19, but do not rule out bacterial infection or co-infection  with other viruses  Laboratories within the [place] and its  territories are required to report all positive results to the appropriate  public health authorities  Negative results do not preclude SARS-CoV-2  infection and should not be used as the sole basis for treatment or other  patient management decisions  Negative results must be combined with  clinical observations, patient history, and epidemiological information  This test has not been FDA cleared or approved  This test has been authorized by FDA under an Emergency Use Authorization  (EUA)  This test is only authorized for the duration of time the  declaration that circumstances exist justifying the authorization of the  emergency use of an in vitro diagnostic tests for detection of SARS-CoV-2  virus and/or diagnosis of COVID-19 infection under section 564(b)(1) of  the Act, 21 U  S C  985YMC-2(Z)(4), unless the authorization is terminated  or revoked sooner  The test has been validated but independent review by FDA  and CLIA is pending  Test performed using [employer] GeneXpert: This RT-PCR assay targets N2,  a region unique to SARS-CoV-2  A conserved region in the E-gene was chosen  for pan-Sarbecovirus detection which includes SARS-CoV-2      B-Type Natriuretic Peptide(BNP) AN, CA, EA Campuses Only [[id number]]  (Abnormal) Collected: 06/09/22 0538    Lab Status: Final result Specimen: Blood from Arm, Left Updated: 06/09/22 0626      pg/mL     Comprehensive metabolic panel [[id number]]  (Abnormal) Collected: 06/09/22 0538    Lab Status: Final result Specimen: Blood from Arm, Left Updated: 06/09/22 7863     Sodium 133 mmol/L      Potassium 3 6 mmol/L      Chloride 91 mmol/L      CO2 32 mmol/L      ANION GAP 10 mmol/L      BUN 15 mg/dL      Creatinine 0 71 mg/dL      Glucose 137 mg/dL      Calcium 9 2 mg/dL      AST 53 U/L      ALT 32 U/L      Alkaline Phosphatase 55 U/L      Total Protein 7 2 g/dL      Albumin 4 2 g/dL      Total Bilirubin 0 70 mg/dL      eGFR 93 ml/min/1 73sq m     Narrative:      [place] guidelines for Chronic Kidney Disease (CKD):     Stage 1 with normal or high GFR (GFR > 90 mL/min/1 73 square meters)    Stage 2 Mild CKD (GFR = 60-89 mL/min/1 73 square meters)    Stage 3A Moderate CKD (GFR = 45-59 mL/min/1 73 square meters)    Stage 3B Moderate CKD (GFR = 30-44 mL/min/1 73 square meters)    Stage 4 Severe CKD (GFR = 15-29 mL/min/1 73 square meters)    Stage 5 End Stage CKD (GFR <15 mL/min/1 73 square meters)  Note: GFR calculation is accurate only with a steady state creatinine    Magnesium [[id number]]  (Normal) Collected: 06/09/22 0538    Lab Status: Final result Specimen: Blood from Arm, Left Updated: 06/09/22 0621     Magnesium 2 2 mg/dL     Phosphorus [[id number]]  (Normal) Collected: 06/09/22 0538    Lab Status: Final result Specimen: Blood from Arm, Left Updated: 06/09/22 0621     Phosphorus 3 1 mg/dL     Lipase [[id number]]  (Abnormal) Collected: 06/09/22 0538    Lab Status: Final result Specimen: Blood from Arm, Left Updated: 06/09/22 0621     Lipase <6 u/L     Protime-INR [[id number]]  (Normal) Collected: 06/09/22 0538    Lab Status: Final result Specimen: Blood from Arm, Left Updated: 06/09/22 0615     Protime 14 0 seconds      INR 1 08    APTT [[id number]]  (Normal) Collected: 06/09/22 0538    Lab Status: Final result Specimen: Blood from Arm, Left Updated: 06/09/22 0615     PTT 34 seconds                  CTA ED chest PE Study   Final Result by Adeola Jones MD (06/09 0757)      No evidence of pulmonary embolus  Severe emphysema  Cholelithiasis  Workstation performed: [id number]         XR chest 1 view portable   Final Result by Janina Arrington DO (06/09 0112)      COPD  Clear lungs  Workstation performed: [id number]                    Procedures  Procedures         ED Course  ED Course as of 06/12/22 0014   Th Jun 09, 2022   [address] [facility]  [place] - [phone]   0730 SO - copd/chf/bipap dnr/dni, pending critical care evaluation                                             MDM    Disposition  Final diagnoses:   Chronic obstructive pulmonary disease, unspecified COPD type ([facility] [address] )   Acute on chronic respiratory failure with hypercapnia ([facility] [address] )     Time reflects when diagnosis was documented in both MDM as applicable and the Disposition within this note     Time User Action Codes Description Comment    6/9/2022 10:14 AM Jl DUNCAN Add [J44 9] Chronic obstructive pulmonary disease, unspecified COPD type ([facility] [address] )     6/9/2022 10:15 AM Lena Hayes Add [J96 22] Acute on chronic respiratory failure with hypercapnia [facility])       ED Disposition     ED Disposition   Admit    Condition   Stable    Date/Time   Thu Jun 9, 2022 10:14 AM    Comment   Case was discussed witH Tay SOLER and the patient's admission status was agreed to be Admission Status: inpatient status to the service of Dr Tay Ruiz              Follow-up Information     Follow up With Specialties Details Why Paola Evans MD Internal Medicine Follow up in 1 week(s)  [place] [address] [facility]  [phone]            Current Discharge Medication List      CONTINUE these medications which have NOT CHANGED    Details   acetaminophen (TYLENOL) 325 mg tablet Take 650 mg by mouth every 6 (six) hours as needed      albuterol (2 5 mg/3 mL) 0 083 % nebulizer solution Take 2 5 mg by nebulization every 6 (six) hours as needed 2 puffs every 6 hours PRN      benzonatate (TESSALON PERLES) 100 mg capsule Take 100 mg by mouth 3 (three) times a day as needed      busPIRone (BUSPAR) 10 mg tablet Take 10 mg by mouth 2 (two) times a day      calcium carbonate (TUMS) 500 mg chewable tablet Chew 1 tablet every 6 (six) hours as needed      diphenhydrAMINE (BENADRYL) 25 mg tablet Take 25 mg by mouth every 6 (six) hours as needed for allergies      ergocalciferol (ERGOCALCIFEROL) 1 25 MG (21330 UT) capsule Take 50,000 Units by mouth once a week      fluticasone-umeclidinium-vilanterol (TRELEGY) 100-62 5-25 MCG/INH inhaler Inhale 1 puff daily Rinse mouth after use  furosemide (LASIX) 40 mg tablet Take 40 mg by mouth daily      guaiFENesin (MUCINEX) 600 mg 12 hr tablet Take 1,200 mg by mouth every 12 (twelve) hours      ipratropium-albuterol (DUO-NEB) 0 5-2 5 mg/3 mL nebulizer solution Take 3 mL by nebulization every 6 (six) hours as needed for shortness of breath or wheezing      ketoconazole (NIZORAL) 2 % cream Apply topically every 12 (twelve) hours Apply to face topically for rash; use for 4 weeks (started 3/02/2022)      lamoTRIgine (LaMICtal) 25 mg tablet Take 50 mg by mouth daily      LORazepam (ATIVAN) 0 5 mg tablet Take 0 5 mg by mouth every 8 (eight) hours as needed for anxiety      magnesium hydroxide (MILK OF MAGNESIA) 400 mg/5 mL oral suspension Take by mouth daily as needed for constipation      Morphine Sulfate, Concentrate, 20 mg/mL concentrated solution Take 0 25 mL (5 mg total) by mouth every 6 (six) hours as needed for severe painMax Daily Amount: 20 mg  Qty: 60 mL, Refills: 0    Associated Diagnoses: Pulmonary emphysema, unspecified emphysema type ([facility] [address] );  Chronic obstructive pulmonary disease, unspecified COPD type ([facility])      omeprazole (PriLOSEC) 20 mg delayed release capsule Take 20 mg by mouth 2 (two) times a day      PARoxetine (PAXIL) 20 mg tablet Take 10 mg by mouth daily      potassium chloride (K-DUR,KLOR-CON) 20 mEq tablet Take 20 mEq by mouth daily      predniSONE 5 mg tablet Take 5 mg by mouth daily      Protein (PROSOURCE PO) Take 30 mL by mouth in the morning      sodium chloride (OCEAN) 0 65 % nasal spray 1 spray into each nostril every 12 (twelve) hours as needed             No discharge procedures on file      PDMP Review     None          ED Provider  Electronically Signed by           Michelle Day MD  06/12/22 1499

## 2022-06-09 NOTE — QUICK NOTE
Follows with palliative care as an outpatient  Recently made transition to level 3  Code status adjusted in chart

## 2022-06-10 LAB
ANION GAP SERPL CALCULATED.3IONS-SCNC: 10 MMOL/L (ref 4–13)
BUN SERPL-MCNC: 25 MG/DL (ref 5–25)
CALCIUM SERPL-MCNC: 9.4 MG/DL (ref 8.4–10.2)
CHLORIDE SERPL-SCNC: 93 MMOL/L (ref 96–108)
CO2 SERPL-SCNC: 32 MMOL/L (ref 21–32)
CREAT SERPL-MCNC: 0.91 MG/DL (ref 0.6–1.3)
ERYTHROCYTE [DISTWIDTH] IN BLOOD BY AUTOMATED COUNT: 11.9 % (ref 11.6–15.1)
GFR SERPL CREATININE-BSD FRML MDRD: 83 ML/MIN/1.73SQ M
GLUCOSE SERPL-MCNC: 106 MG/DL (ref 65–140)
HCT VFR BLD AUTO: 38.7 % (ref 36.5–49.3)
HGB BLD-MCNC: 12.9 G/DL (ref 12–17)
MAGNESIUM SERPL-MCNC: 2.5 MG/DL (ref 1.9–2.7)
MCH RBC QN AUTO: 30.4 PG (ref 26.8–34.3)
MCHC RBC AUTO-ENTMCNC: 33.3 G/DL (ref 31.4–37.4)
MCV RBC AUTO: 91 FL (ref 82–98)
PLATELET # BLD AUTO: 301 THOUSANDS/UL (ref 149–390)
PMV BLD AUTO: 10.9 FL (ref 8.9–12.7)
POTASSIUM SERPL-SCNC: 4.1 MMOL/L (ref 3.5–5.3)
RBC # BLD AUTO: 4.25 MILLION/UL (ref 3.88–5.62)
SODIUM SERPL-SCNC: 135 MMOL/L (ref 135–147)
WBC # BLD AUTO: 9.18 THOUSAND/UL (ref 4.31–10.16)

## 2022-06-10 PROCEDURE — 83735 ASSAY OF MAGNESIUM: CPT | Performed by: STUDENT IN AN ORGANIZED HEALTH CARE EDUCATION/TRAINING PROGRAM

## 2022-06-10 PROCEDURE — 97167 OT EVAL HIGH COMPLEX 60 MIN: CPT

## 2022-06-10 PROCEDURE — 97163 PT EVAL HIGH COMPLEX 45 MIN: CPT

## 2022-06-10 PROCEDURE — 94640 AIRWAY INHALATION TREATMENT: CPT

## 2022-06-10 PROCEDURE — 94760 N-INVAS EAR/PLS OXIMETRY 1: CPT

## 2022-06-10 PROCEDURE — 99232 SBSQ HOSP IP/OBS MODERATE 35: CPT | Performed by: INTERNAL MEDICINE

## 2022-06-10 PROCEDURE — 80048 BASIC METABOLIC PNL TOTAL CA: CPT | Performed by: STUDENT IN AN ORGANIZED HEALTH CARE EDUCATION/TRAINING PROGRAM

## 2022-06-10 PROCEDURE — 85027 COMPLETE CBC AUTOMATED: CPT | Performed by: STUDENT IN AN ORGANIZED HEALTH CARE EDUCATION/TRAINING PROGRAM

## 2022-06-10 RX ORDER — ALBUTEROL SULFATE 90 UG/1
2 AEROSOL, METERED RESPIRATORY (INHALATION) EVERY 4 HOURS PRN
Status: DISCONTINUED | OUTPATIENT
Start: 2022-06-10 | End: 2022-06-13 | Stop reason: HOSPADM

## 2022-06-10 RX ADMIN — METHYLPREDNISOLONE SODIUM SUCCINATE 40 MG: 40 INJECTION, POWDER, FOR SOLUTION INTRAMUSCULAR; INTRAVENOUS at 20:38

## 2022-06-10 RX ADMIN — PANTOPRAZOLE SODIUM 40 MG: 40 TABLET, DELAYED RELEASE ORAL at 05:40

## 2022-06-10 RX ADMIN — DOCUSATE SODIUM 100 MG: 100 CAPSULE, LIQUID FILLED ORAL at 17:06

## 2022-06-10 RX ADMIN — SENNOSIDES 8.6 MG: 8.6 TABLET, FILM COATED ORAL at 09:44

## 2022-06-10 RX ADMIN — LAMOTRIGINE 50 MG: 100 TABLET ORAL at 09:44

## 2022-06-10 RX ADMIN — BUSPIRONE HYDROCHLORIDE 10 MG: 5 TABLET ORAL at 17:06

## 2022-06-10 RX ADMIN — GUAIFENESIN 1200 MG: 600 TABLET ORAL at 20:37

## 2022-06-10 RX ADMIN — METHYLPREDNISOLONE SODIUM SUCCINATE 40 MG: 40 INJECTION, POWDER, FOR SOLUTION INTRAMUSCULAR; INTRAVENOUS at 09:48

## 2022-06-10 RX ADMIN — BUSPIRONE HYDROCHLORIDE 10 MG: 5 TABLET ORAL at 09:46

## 2022-06-10 RX ADMIN — HEPARIN SODIUM 5000 UNITS: 5000 INJECTION INTRAVENOUS; SUBCUTANEOUS at 05:40

## 2022-06-10 RX ADMIN — HEPARIN SODIUM 5000 UNITS: 5000 INJECTION INTRAVENOUS; SUBCUTANEOUS at 21:33

## 2022-06-10 RX ADMIN — IPRATROPIUM BROMIDE AND ALBUTEROL SULFATE 3 ML: 2.5; .5 SOLUTION RESPIRATORY (INHALATION) at 21:16

## 2022-06-10 RX ADMIN — HEPARIN SODIUM 5000 UNITS: 5000 INJECTION INTRAVENOUS; SUBCUTANEOUS at 13:05

## 2022-06-10 RX ADMIN — AZITHROMYCIN MONOHYDRATE 500 MG: 250 TABLET ORAL at 09:45

## 2022-06-10 RX ADMIN — DOCUSATE SODIUM 100 MG: 100 CAPSULE, LIQUID FILLED ORAL at 09:44

## 2022-06-10 RX ADMIN — GUAIFENESIN 1200 MG: 600 TABLET ORAL at 09:44

## 2022-06-10 RX ADMIN — PAROXETINE 10 MG: 20 TABLET, FILM COATED ORAL at 09:45

## 2022-06-10 NOTE — ASSESSMENT & PLAN NOTE
· Present on admission with cough, shortness of breath, respiratory failure  · Required BiPAP in ED  · Home regimen: 3 L of oxygen NC, Trelegy, prednisone 5 mg daily, ipratropium albuterol 5 times daily and q 6h p r n  Symptoms: acute dyspnea and chronic dyspnea  COVID: negative , Vaccinated: unknown    Last PFT: unknown    Smoking Status: unknown    CXR shows changes consistent w/ COPD   Procal 0 38    Lactic Acid 1 5    ABG:  PH 7 34, CO2 56 8, O2 88 4, bicarb 30 4   BC: NG at 48hrs    Plan:   IV Solu-Medrol:  40 IV q 12 h; taper as appropriate to oral steroids   Inhalers: Duo-Nebs q 6h p r n   Currently on 3 L NC (baseline), Goal O2 sat 88-92% , wean as appropriate     BiPAP p r n   · Azithromycin 500 mg Q 24 H  · Mucinex 1200 mg q 12h   Monitor respiratory status

## 2022-06-10 NOTE — ASSESSMENT & PLAN NOTE
· Noted to have cholelithiasis   · Follow up as an outpatient   · Currently asymptomatic and benign abdominal exam

## 2022-06-10 NOTE — PLAN OF CARE
Problem: Potential for Falls  Goal: Patient will remain free of falls  Description: INTERVENTIONS:  - Educate patient/family on patient safety including physical limitations  - Instruct patient to call for assistance with activity   - Consult OT/PT to assist with strengthening/mobility   - Keep Call bell within reach  - Keep bed low and locked with side rails adjusted as appropriate  - Keep care items and personal belongings within reach  - Initiate and maintain comfort rounds  - Make Fall Risk Sign visible to staff  - Offer Toileting every 2 Hours, in advance of need  - Initiate/Maintain bed alarm  - Obtain necessary fall risk management equipment  - Apply yellow socks and bracelet for high fall risk patients  - Consider moving patient to room near nurses station  Outcome: Progressing     Problem: MOBILITY - ADULT  Goal: Maintain or return to baseline ADL function  Description: INTERVENTIONS:  -  Assess patient's ability to carry out ADLs; assess patient's baseline for ADL function and identify physical deficits which impact ability to perform ADLs (bathing, care of mouth/teeth, toileting, grooming, dressing, etc )  - Assess/evaluate cause of self-care deficits   - Assess range of motion  - Assess patient's mobility; develop plan if impaired  - Assess patient's need for assistive devices and provide as appropriate  - Encourage maximum independence but intervene and supervise when necessary  - Involve family in performance of ADLs  - Assess for home care needs following discharge   - Consider OT consult to assist with ADL evaluation and planning for discharge  - Provide patient education as appropriate  Outcome: Progressing     Problem: MOBILITY - ADULT  Goal: Maintains/Returns to pre admission functional level  Description: INTERVENTIONS:  - Perform BMAT or MOVE assessment daily    - Set and communicate daily mobility goal to care team and patient/family/caregiver     - Collaborate with rehabilitation services on mobility goals if consulted  - Perform Range of Motion *4 times a day  - Reposition patient every 2 hours    - Dangle patient 2 times a day  - Stand patient 2 times a day  - Ambulate patient 1 time a day  - Out of bed to chair 2 times a day   - Out of bed for meals 2 times a day  - Out of bed for toileting  - Record patient progress and toleration of activity level   Outcome: Progressing     Problem: Prexisting or High Potential for Compromised Skin Integrity  Goal: Skin integrity is maintained or improved  Description: INTERVENTIONS:  - Identify patients at risk for skin breakdown  - Assess and monitor skin integrity  - Assess and monitor nutrition and hydration status  - Monitor labs   - Assess for incontinence   - Turn and reposition patient  - Assist with mobility/ambulation  - Relieve pressure over bony prominences  - Avoid friction and shearing  - Provide appropriate hygiene as needed including keeping skin clean and dry  - Evaluate need for skin moisturizer/barrier cream  - Collaborate with interdisciplinary team   - Patient/family teaching  - Consider wound care consult   Outcome: Progressing

## 2022-06-10 NOTE — ASSESSMENT & PLAN NOTE
· Seen on VBG  · Improving with supplemental oxygen and plan above  · Monitor respiratory status headache

## 2022-06-10 NOTE — PLAN OF CARE
Problem: OCCUPATIONAL THERAPY ADULT  Goal: Performs self-care activities at highest level of function for planned discharge setting  See evaluation for individualized goals  Description: Treatment Interventions: ADL retraining, Functional transfer training, Endurance training, Cognitive reorientation, Patient/family training, Equipment evaluation/education, Compensatory technique education, Energy conservation, Activityengagement          See flowsheet documentation for full assessment, interventions and recommendations  Note: Limitation: Decreased ADL status, Decreased UE strength, Decreased Safe judgement during ADL, Decreased cognition, Decreased endurance, Decreased self-care trans, Decreased high-level ADLs  Prognosis: Good  Assessment: Pt is a 66 y o  male seen for OT evaluation s/p admission to [address] [place] [address] on 6/9/2022 due to desatting into 60s, and SOB  Pt diagnosed with Chronic obstructive pulmonary disease with acute exacerbation ([facility] [address] )  Pt has a significant PMH impacting occupational performance including: bipolar, anxiety, GERD  Pt with no recent admissions in the last 2 months  Pt with active OT evaluation and treatment orders and activity orders for Up with assistance  PTA pt living at [facility], pt requires (A) with ADLs and IADLs, no use of AD at baseline  Pt is motivated to return to facility  Personal and environmental factors supporting pt at time of IE include social support and accessible home environment  Personal and environmental factors inhibiting engagement in occupations include advanced age, difficulty completing ADLs and difficulty completing IADLs  During evaluation pt performed as is outlined above in flowsheet  Pt required VC for safety, VC for attention to task, seated rest breaks, PLB education and increased recovery time due to O2 deficits  Standardized assessments used to assist in identifying performance deficits include AMPAC 6-Clicks and Barthel ADL Index   Performance deficits that affect the pts occupational performance during the initial evaluation include impaired balance, functional mobility, endurance, activity tolerance, fine motor coordination, functional standing tolerance and overall strength, direction following, safety awareness, insight into deficits and problem solving, interpersonal skills  Based on pts functional performance and deficits the following occupations will be addressed in OT treatments in order to maximize pts independence and overall occupational performance: grooming, bathing/showering, toileting and toilet hygiene, dressing and functional mobility  Goals are listed below  Upon discharge from acute care setting recommend d/c to return to facility with OT  This evaluation required an extensive review of medical and/or therapy records and additional review of physical, cognitive and psychosocial history related to functional performance  Based upon functional performance deficits and assessments, this evaluation has been identified as a high complexity evaluation       OT Discharge Recommendation: Return to facility with rehabilitation services

## 2022-06-10 NOTE — CASE MANAGEMENT
Case Management Assessment & Discharge Planning Note    Patient name Zoe Haq  Location S /S -43 MRN [id number]  : 1950 Date 6/10/2022       Current Admission Date: 2022  Current Admission Diagnosis:Chronic obstructive pulmonary disease with acute exacerbation [facility])   Patient Active Problem List    Diagnosis Date Noted    Abnormal computed tomography angiography (CTA) 2022    Elevated troponin 2022    Seborrheic dermatitis 2022    Protein calorie malnutrition ([facility] [address] ) 2021    Encounter for screening for malignant neoplasm of lung in former smoker who quit in past 15 years with 30 pack year history or greater 2021    Depression 10/29/2021    Hyponatremia     Personal history of tobacco use 2021    Vitamin D deficiency 2020    Allergies 2020    Chronic obstructive pulmonary disease with acute exacerbation ([facility] [address] ) 07/15/2020    Anxiety 07/15/2020    [facility] 07/15/2020    Acute on chronic respiratory failure with hypoxia and hypercapnia (HCC) 07/15/2020    Hypokalemia 07/15/2020    Gastroesophageal reflux disease without esophagitis 07/15/2020      LOS (days): 1  Geometric Mean LOS (GMLOS) (days): 3 60  Days to GMLOS:2 5     OBJECTIVE:    Risk of Unplanned Readmission Score: 14 38         Current admission status: Inpatient       Preferred Pharmacy:   [phone] [facility] [address] [place] [address] Drive  Phone: [phone] Fax: [phone]    Primary Care Provider: Elin Pleitez MD    Primary Insurance: MEDICARE  Secondary Insurance: [address] [place] [address] Blvd:  Modesta [facility] Proxies    There are no active Health Care Proxies on file                   Readmission Root Cause  30 Day Readmission: No    Patient Information  Admitted from[de-identified] Facility  Mental Status: Confused  During Assessment patient was accompanied by: Not accompanied during assessment  Assessment information provided by[de-identified] Friend  Primary Caregiver: Other (Comment)  Caregiver's Name[de-identified] [employer] staff  Caregiver's Relationship to Patient[de-identified] Other (Specify)  Support Systems: Friend, Other (Comment) (Cira Hughes)  Home entry access options   Select all that apply : No steps to enter home  Type of Current Residence: Facility ([employer]  Upon entering residence, is there a bedroom on the main floor (no further steps)?: Yes  Upon entering residence, is there a bathroom on the main floor (no further steps)?: Yes  In the last 12 months, was there a time when you were not able to pay the mortgage or rent on time?: No  In the last 12 months, was there a time when you did not have a steady place to sleep or slept in a shelter (including now)?: No  Homeless/housing insecurity resource given?: N/A  Living Arrangements: Lives Alone    Activities of Daily Living Prior to Admission  Functional Status: Assistance  Completes ADLs independently?: No  Level of ADL dependence: Assistance  Ambulates independently?: No  Level of ambulatory dependence: Assistance  Does patient use assisted devices?: Yes  Assisted Devices (DME) used: [facility]  Does patient currently own DME?: Yes  What DME does the patient currently own?: [employer] Wolf  Does patient have a history of Outpatient Therapy (PT/OT)?: No  Does the patient have a history of Short-Term Rehab?: Yes ([facility]  Does patient have a history of HHC?: No  Does patient currently have [facility]?: No         Patient Information Continued  Income Source: Pension/residential  Does patient have prescription coverage?: Yes  Within the past 12 months, you worried that your food would run out before you got the money to buy more : Never true  Within the past 12 months, the food you bought just didn't last and you didn't have money to get more : Never true  Food insecurity resource given?: N/A  Does patient receive dialysis treatments?: No  Does patient have a history of substance abuse?: No  Does patient have a history of Mental Health Diagnosis?: No         Means of Transportation  Means of Transport to Appts[de-identified] Friends  In the past 12 months, has lack of transportation kept you from medical appointments or from getting medications?: No  In the past 12 months, has lack of transportation kept you from meetings, work, or from getting things needed for daily living?: No  Was application for public transport provided?: N/A        DISCHARGE DETAILS:    Discharge planning discussed with[de-identified] patient's friend/emergency contact Anthony  Freedom of Choice: Yes  Comments - Freedom of Choice: AT this time Anthony is aware of shayna's medical needs  Anthony explained that 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 when medically stable  CM contacted family/caregiver?: Yes  Were Treatment Team discharge recommendations reviewed with patient/caregiver?: Yes  Did patient/caregiver verbalize understanding of patient care needs?: Yes       Contacts  Patient Contacts: Anthony Zaidi  Relationship to Patient[de-identified] Friend  Contact Method: Phone  Phone Number: [phone]  Reason/Outcome: Emergency Contact, Discharge Planning, Continuity of [facility]         Is the patient interested in [address] at discharge?: No    DME Referral Provided  Referral made for DME?: No    Other Referral/Resources/Interventions Provided:  Interventions: SNF  Referral Comments: REferral sent back to [facility] where patient was receiving long term care           Treatment Team Recommendation: SNF  Discharge Destination Plan[de-identified] SNF

## 2022-06-10 NOTE — PROGRESS NOTES
[facility]  Progress Note Urmila NARVAEZ [facility] 1950, 66 y o  male MRN: [id number]  Unit/Bed#: S -01 Encounter: [id number]  Primary Care Provider: Marielena Herrera MD   Date and time admitted to hospital: 6/9/2022  5:20 AM    * Chronic obstructive pulmonary disease with acute exacerbation ([facility] )  Assessment & Plan  · Present on admission with cough, shortness of breath, respiratory failure  · Required BiPAP in ED  · Home regimen: 3 L of oxygen NC, Trelegy, prednisone 5 mg daily, ipratropium albuterol 5 times daily and q 6h p r n  Symptoms: acute dyspnea and chronic dyspnea  COVID: negative , Vaccinated: unknown    Last PFT: unknown    Smoking Status: unknown    CXR shows changes consistent w/ COPD   Procal 0 38    Lactic Acid 1 5    ABG:  PH 7 34, CO2 56 8, O2 88 4, bicarb 30 4    Plan:   Pending blood cultures    IV Solu-Medrol:  40 IV q 12 h; taper as appropriate   Inhalers: Duo-Nebs q 6h p r n   Currently on 5 L NC, Goal O2 sat 88-92% , wean as appropriate  · Azithromycin 500 mg Q 24 H  · Mucinex 1200 mg q 12h   Monitor respiratory status      Acute on chronic respiratory failure with hypoxia and hypercapnia (HCC)  Assessment & Plan  · Seen on VBG  · Improving with supplemental oxygen and plan above  · Monitor respiratory status    Elevated troponin  Assessment & Plan  · Non chest pain associated elevation in troponin  EKG 6/9: NSR  · Etiology:  Likely in setting of respiratory failure    · No interventions required for now  · Monitor vitals, hemodynamics    Abnormal computed tomography angiography (CTA)  Assessment & Plan  · Noted to have cholelithiasis   · Follow up as an outpatient   · Currently asymptomatic and benign abdominal exam    Gastroesophageal reflux disease without esophagitis  Assessment & Plan  · Continue PPI and tums PRN    Bipolar disorder ([facility] )  Assessment & Plan  · Continue home meds: lamotrigine    Anxiety  Assessment & Plan  · Continue Paxil, Buspar    VTE Pharmacologic Prophylaxis: VTE Score: 5 Moderate Risk (Score 3-4) - Pharmacological DVT Prophylaxis Ordered: heparin  Patient Centered Rounds: I performed bedside rounds with nursing staff today  Discussions with Specialists or Other Care Team Provider:  None     Education and Discussions with Family / Patient: will update caregiver at [facility] [address] later today  Current Length of Stay: 1 day(s)  Current Patient Status: Inpatient   Discharge Plan: Anticipate discharge in 48-72 hrs to [facility]    Code Status: Level 3 - DNAR and DNI    Subjective:   Patient seen and examined at bedside  No significant events overnight; used BiPAP overnight without any issues as per nursing  Patient denies chest pain, worsening shortness breath, abdominal pain, nausea vomiting, fever, chills, headache, dizziness or palpitations, difficulty urinating  Objective:     Vitals:   Temp (24hrs), Av 9 °F (36 6 °C), Min:97 5 °F (36 4 °C), Max:98 2 °F (36 8 °C)    Temp:  [97 5 °F (36 4 °C)-98 2 °F (36 8 °C)] 97 5 °F (36 4 °C)  HR:  [67-91] 75  Resp:  [16-17] 17  BP: ()/(53-90) 134/78  SpO2:  [92 %-96 %] 95 %  Body mass index is 20 39 kg/m²  Input and Output Summary (last 24 hours): Intake/Output Summary (Last 24 hours) at 6/10/2022 1404  Last data filed at 6/10/2022 1301  Gross per 24 hour   Intake --   Output 1125 ml   Net -1125 ml     Physical Exam   Vitals reviewed  General Examination:  sitting in chair, cooperative   HEENT: Normocephalic, Atraumatic  Extraocular movements intact, PERRLA  CVS: S1, S2 noted  Lungs:  Poor air movement, mild wheezing (bibasilar), on 5 L oxygen via NC, saturating 96%  Abdomen: Soft, normal bowel sounds  Non distended, non tender  Ext: No edema noted  Psych: Though Process - logical    Skin: No bleeding/bruising noted  Neuro: A, Ox3  Follows simple, 3 steps commands        Additional Data:     Labs:  Results from last 7 days   Lab Units 06/10/22  9744 06/09/22  0538   WBC Thousand/uL 9 18 9 48   HEMOGLOBIN g/dL 12 9 13 3   HEMATOCRIT % 38 7 40 0   PLATELETS Thousands/uL 301 308   BANDS PCT %  --  24*   LYMPHO PCT %  --  8*   MONO PCT %  --  15*   EOS PCT %  --  0     Results from last 7 days   Lab Units 06/10/22  0431 06/09/22  0538   SODIUM mmol/L 135 133*   POTASSIUM mmol/L 4 1 3 6   CHLORIDE mmol/L 93* 91*   CO2 mmol/L 32 32   BUN mg/dL 25 15   CREATININE mg/dL 0 91 0 71   ANION GAP mmol/L 10 10   CALCIUM mg/dL 9 4 9 2   ALBUMIN g/dL  --  4 2   TOTAL BILIRUBIN mg/dL  --  0 70   ALK PHOS U/L  --  55   ALT U/L  --  32   AST U/L  --  53*   GLUCOSE RANDOM mg/dL 106 137     Results from last 7 days   Lab Units 06/09/22  0538   INR  1 08             Results from last 7 days   Lab Units 06/09/22  0538   LACTIC ACID mmol/L 1 5   PROCALCITONIN ng/ml 0 38*       Lines/Drains:  Invasive Devices  Report    Peripheral Intravenous Line  Duration           Peripheral IV 06/09/22 Left Antecubital 1 day              Imaging:  Reviewed pertinent imaging reports during this admission including:  CTA ED chest PE Study   Final Result by Ashlyn Tan MD (06/09 0757)      No evidence of pulmonary embolus  Severe emphysema  Cholelithiasis  Workstation performed: [id number]         XR chest 1 view portable   Final Result by Charbel Ivory DO (06/09 0111)      COPD  Clear lungs  Workstation performed: [id number]           Recent Cultures (last 7 days):   Results from last 7 days   Lab Units 06/09/22  0536 06/09/22  0535   BLOOD CULTURE  No Growth at 24 hrs  No Growth at 24 hrs         Last 24 Hours Medication List:   Current Facility-Administered Medications   Medication Dose Route Frequency Provider Last Rate    acetaminophen  650 mg Oral Q6H PRN Kandis Farmer MD      azithromycin  500 mg Oral Q24H Kandis Farmer MD      benzonatate  100 mg Oral TID PRN Kandis Farmer MD      busPIRone  10 mg Oral BID Kandis Farmer MD      calcium carbonate  500 mg Oral Q6H PRN Jeanie Fails, MD      docusate sodium  100 mg Oral BID Jeanie Fails, MD      guaiFENesin  1,200 mg Oral Q12H [facility], MD      heparin (porcine)  5,000 Units Subcutaneous [facility] Jeanie Fails, MD      ipratropium-albuterol  3 mL Nebulization Q6H PRN Jeanie Fails, MD      lamoTRIgine  50 mg Oral Daily Jeanie Fails, MD      methylPREDNISolone sodium succinate  40 mg Intravenous Q12H [facility], MD      pantoprazole  40 mg Oral Early Morning Jeanie Fails, MD      PARoxetine  10 mg Oral Daily Jeanie Fails, MD      senna  1 tablet Oral Daily Jeanie Fails, MD          Today, Patient Was Seen By: Ricardo Alcazar MD    **Please Note: This note may have been constructed using a voice recognition system  **

## 2022-06-10 NOTE — OCCUPATIONAL THERAPY NOTE
Occupational Therapy Evaluation     Patient Name: Nikki Joiner  RNSFP'O Date: 6/10/2022  Problem List  Principal Problem:    Chronic obstructive pulmonary disease with acute exacerbation ([facility] [address] )  Active Problems:    Anxiety    Bipolar disorder ([facility] [address] )    Acute on chronic respiratory failure with hypoxia and hypercapnia (HCC)    Gastroesophageal reflux disease without esophagitis    Abnormal computed tomography angiography (CTA)    Elevated troponin    Past Medical History  Past Medical History:   Diagnosis Date    Acute and chronic respiratory failure with hypoxia (HCC)     Asthma     CHF (congestive heart failure) (HCC)     COPD (chronic obstructive pulmonary disease) ([facility] [address] )     Delirium     Hypokalemia     Hyponatremia      Past Surgical History  History reviewed  No pertinent surgical history  06/10/22 1353   OT Last Visit   OT Visit Date 06/10/22   Note Type   Note type Evaluation   Restrictions/Precautions   Weight Bearing Precautions Per Order No   Other Precautions O2;Cognitive; Chair Alarm; Bed Alarm  (2L O2 via NC: however pt not wearing in his nose, states that he is a mouth breather, and placed O2 pointing into mouth, O2 ranging 91-95% during session)   Pain Assessment   Pain Assessment Tool 0-10   Pain Score No Pain   Home Living   Type of Home SNF  (Long Term Resident at [facility])   [address] [place] [address] One level   [address] [facility]   Additional Comments states that he does not use AD at baseline   Prior Function   Lives With Facility staff   Receives Help From Personal care attendant   ADL Assistance Needs assistance   IADLs Needs assistance   Falls in the last 6 months   (unable to report)   Vocational Retired   Comments pt is a poor historian, is a LTC resident at [facility], per chart review pt has been there since 3/2020   Lifestyle   Autonomy PTA pt living at [facility], pt requires (A) with ADLs and IADLs, no use of AD at baseline   Reciprocal Relationships facility staff   Service to Others retired   Intrinsic Gratification enjoys "resting"   Subjective   Subjective "I feel like I'm at the end, I'm ready to be done"   ADL   Eating Assistance 5  Supervision/Setup   Grooming Assistance 5  Supervision/Setup   Grooming Deficit Increased time to complete;Verbal cueing;Supervision/safety;Brushing hair  (Pt noted to be highly unkempt and with overall poor hygiene)   UB Bathing Assistance 4  Minimal Assistance   LB Bathing Assistance 4  Minimal Assistance   [address] [facility] 4  Minimal Assistance   LB Dressing Assistance 4  Minimal [address]  4  Minimal Assistance   Bed Mobility   Supine to Sit   (in chair upon arrival)   Sit to Supine 6  Modified independent   Transfers   Sit to Stand 4  Minimal assistance   Additional items Assist x 1; Increased time required;Verbal cues   Stand to Sit 4  Minimal assistance   Additional items Assist x 1; Increased time required;Verbal cues   Additional Comments no use of AD   Functional Mobility   Functional Mobility 4  Minimal assistance   Additional Comments Ax1, limited distance bed > chair due to fatigue   Balance   Static Sitting Good   Dynamic Sitting Fair +   Static Standing Fair   Dynamic Standing Fair -   Ambulatory Fair -   Activity Tolerance   Activity Tolerance Patient limited by fatigue  (SOB)   Medical Staff Made Aware GERARDO [place]   RUE Assessment   RUE Assessment WFL   LUE Assessment   LUE Assessment WFL   Hand Function   Gross Motor Coordination Functional   Fine Motor Coordination Functional   Cognition   Overall Cognitive Status Impaired   Arousal/Participation Alert; Cooperative   Attention Attends with cues to redirect   Orientation Level Oriented to person;Disoriented to place; Disoriented to time;Disoriented to situation   Memory Decreased short term memory;Decreased recall of recent events   Following Commands Follows one step commands with increased time or repetition   Comments pleasant and cooperative, tangential and nonsensical throughout and poor historian   Assessment   Limitation Decreased ADL status; Decreased UE strength;Decreased Safe judgement during ADL;Decreased cognition;Decreased endurance;Decreased self-care trans;Decreased high-level ADLs   Prognosis Good   Assessment Pt is a 66 y o  male seen for OT evaluation s/p admission to [address] [place] [address] on 6/9/2022 due to desatting into 60s, and SOB  Pt diagnosed with Chronic obstructive pulmonary disease with acute exacerbation ([facility] [address] )  Pt has a significant PMH impacting occupational performance including: bipolar, anxiety, GERD  Pt with no recent admissions in the last 2 months  Pt with active OT evaluation and treatment orders and activity orders for Up with assistance  PTA pt living at [facility], pt requires (A) with ADLs and IADLs, no use of AD at baseline  Pt is motivated to return to facility  Personal and environmental factors supporting pt at time of IE include social support and accessible home environment  Personal and environmental factors inhibiting engagement in occupations include advanced age, difficulty completing ADLs and difficulty completing IADLs  During evaluation pt performed as is outlined above in flowsheet  Pt required VC for safety, VC for attention to task, seated rest breaks, PLB education and increased recovery time due to O2 deficits  Standardized assessments used to assist in identifying performance deficits include AMPAC 6-Clicks and Barthel ADL Index  Performance deficits that affect the pts occupational performance during the initial evaluation include impaired balance, functional mobility, endurance, activity tolerance, fine motor coordination, functional standing tolerance and overall strength, direction following, safety awareness, insight into deficits and problem solving, interpersonal skills   Based on pts functional performance and deficits the following occupations will be addressed in OT treatments in order to maximize pts independence and overall occupational performance: grooming, bathing/showering, toileting and toilet hygiene, dressing and functional mobility  Goals are listed below  Upon discharge from acute care setting recommend d/c to return to facility with OT  This evaluation required an extensive review of medical and/or therapy records and additional review of physical, cognitive and psychosocial history related to functional performance  Based upon functional performance deficits and assessments, this evaluation has been identified as a high complexity evaluation  Goals   Patient Goals to breathe better   LTG Time Frame 10-14   Long Term Goal see goals listed below   Plan   Treatment Interventions ADL retraining;Functional transfer training; Endurance training;Cognitive reorientation;Patient/family training;Equipment evaluation/education; Compensatory technique education; Energy conservation; Activityengagement   Goal Expiration Date 06/20/22   OT Treatment Day 0   OT Frequency 2-3x/wk   Recommendation   OT Discharge Recommendation Return to facility with rehabilitation services   AM-PAC Daily Activity Inpatient   Lower Body Dressing 3   Bathing 3   Toileting 3   Upper Body Dressing 3   Grooming 3   Eating 3   Daily Activity Raw Score 18   Daily Activity Standardized Score (Calc for Raw Score >=11) 38 66   AM-PAC Applied Cognition Inpatient   Following a Speech/Presentation 2   Understanding Ordinary Conversation 3   Taking Medications 2   Remembering Where Things Are Placed or Put Away 1   Remembering List of 4-5 Errands 1   Taking Care of Complicated Tasks 1   Applied Cognition Raw Score 10   Applied Cognition Standardized Score 24 98     GOALS:   Goals established in order to promote pt's established goal of "breathing better"     -Patient will perform grooming tasks standing at sink with overall Mod I    -Patient will be Mod I with UB ADLs using AE and AD as needed     -Patient will be Mod I with LB ADLs with use of AE and AD as needed    -Patient will complete toileting w/ Supervision w/ G hygiene/thoroughness    -Patient will perform functional transfers with Mod I to/from all surfaces using DME as needed    -Patient will be Mod I with functional mobility to/from bathroom for increased independence with toileting tasks    -Patient will tolerate therapeutic activities for greater than 30 min, in order to increase tolerance for functional activities      -Patient will engage in ongoing cognitive assessment in order to assist with safe discharge planning/recommendations     -Patient will independently integrate one pacing strategy into morning ADL's     -Patient will demonstrate PLB techniques during ADL tasks with min VC    The patient's raw score on the AM-PAC Daily Activity inpatient short form is 18, standardized score is 38 66, less than 39 4  Patients at this level are likely to benefit from discharge to post-acute rehabilitation services  Please refer to the recommendation of the Occupational Therapist for safe discharge planning        At the end of the session, all needs met and pt supine in bed, bed alarm activated, HOB elevated, and call bell within reach    [facility], OTR/L

## 2022-06-10 NOTE — ASSESSMENT & PLAN NOTE
· Present on admission with cough, shortness of breath, respiratory failure  · Required BiPAP in ED  · Home regimen: 3 L of oxygen NC, Trelegy, prednisone 5 mg daily, ipratropium albuterol 5 times daily and q 6h p r n  Symptoms: acute dyspnea and chronic dyspnea  COVID: negative , Vaccinated: unknown    Last PFT: unknown    Smoking Status: unknown    CXR shows changes consistent w/ COPD   Procal 0 38    Lactic Acid 1 5    ABG:  PH 7 34, CO2 56 8, O2 88 4, bicarb 30 4    Plan:   Pending blood cultures    IV Solu-Medrol:  40 IV q 12 h; taper as appropriate   Inhalers: Duo-Nebs q 6h p r n     Currently on 5 L NC, Goal O2 sat 88-92%, use CPAP at nighttime for VINEET  · Azithromycin 500 mg Q 24 H  · Mucinex 1200 mg q 12h   Monitor respiratory status

## 2022-06-10 NOTE — ASSESSMENT & PLAN NOTE
· Non chest pain associated elevation in troponin  EKG 6/9: NSR  · Etiology:  Likely in setting of respiratory failure    · No interventions required for now  · Monitor vitals, hemodynamics

## 2022-06-10 NOTE — PHYSICAL THERAPY NOTE
PHYSICAL THERAPY EVALUATION  TIME: 0902-0926    NAME:  Chio Rice  DATE: 06/10/22    AGE:   66 y o  Mrn:   [id number]  Length Of Stay: 1    ADMIT DX:  Altered mental status [R41 82]  Acute on chronic respiratory failure with hypercapnia (HCC) [J96 22]  Chronic obstructive pulmonary disease, unspecified COPD type ([address] ) [J44 9]    Past Medical History:   Diagnosis Date    Acute and chronic respiratory failure with hypoxia (HCC)     Asthma     CHF (congestive heart failure) (HCC)     COPD (chronic obstructive pulmonary disease) (HCC)     Delirium     Hypokalemia     Hyponatremia      History reviewed  No pertinent surgical history  Performed at least 2 patient identifiers during session: Name, Paul Watkins, and ID bracelet       06/10/22 0826   PT Last Visit   PT Visit Date 06/10/22   Note Type   Note type Evaluation   Pain Assessment   Pain Assessment Tool 0-10   Pain Score No Pain   Effect of Pain on Daily Activities n/a   Patient's Stated Pain Goal No pain   Hospital Pain Intervention(s) Ambulation/increased activity;Repositioned   Multiple Pain Sites No   Restrictions/Precautions   Weight Bearing Precautions Per Order No   Other Precautions O2;Cognitive; Chair Alarm; Bed Alarm; Fall Risk  (+4L O2 via NC)   Home Living   Type of Home SNF  (Long term resident of [facility])   Home Equipment Walker   Additional Comments Pt has RW but states he was "rarely using it"   Prior Function   Level of [place] Other (Comment)  (independent functional mobility in SNF room, assist with ADLs and IADLs)   Lives With Facility staff   Receives Help From Personal care attendant   ADL Assistance Needs assistance   IADLs Needs assistance   Falls in the last 6 months   (pt unable to report # of falls)   Vocational Retired   Comments Pt is a poor/unreliable historian  Per EMR, is a long term resident of [facility], has been there since 3/2020  Initially started as STR but transitioned to LTC   Pt reports he was offered therapy but declines it  Pt states he is mobile/ambulatory w/in his room independently at [facility]; has a RW "but rarely uses it"  Unable to recall if he has falls or not  General   Additional Pertinent History Pt admitted from [facility] with worsening SOB and instability  Dx: COPD exacerbation  Family/Caregiver Present No   Cognition   Overall Cognitive Status Impaired   Arousal/Participation Cooperative   Orientation Level Oriented to person;Disoriented to place; Disoriented to time;Disoriented to situation   Memory Decreased short term memory;Decreased recall of recent events;Decreased recall of precautions;Decreased long term memory   Following Commands Follows one step commands with increased time or repetition   Subjective   Subjective "I try not to move much at all, because my breathing gets worse right away "   RUE Assessment   RUE Assessment WFL   LUE Assessment   LUE Assessment WFL   RLE Assessment   RLE Assessment WFL  (grossly 4-5 MMT throughout)   LLE Assessment   LLE Assessment WFL  (grossly 4-5 MMT throughout)   Coordination   Movements are Fluid and Coordinated 0   Coordination and Movement Description Moderately ataxic upon standing and short distance amb; no AD used  Sensation WFL   Light Touch   RLE Light Touch Grossly intact   LLE Light Touch Grossly intact   Proprioception   RLE Proprioception Grossly intact   LLE Proprioception Grossly Intact   Bed Mobility   Supine to Sit 6  Modified independent   Additional items Assist x 1;HOB elevated;Verbal cues   Sit to Supine Unable to assess   Additional Comments Pt was left seated OOB in recliner chair with alarm engaged at end of session, needs in reach, care returned to RN  Transfers   Sit to Stand 4  Minimal assistance  (CGA for steadying)   Additional items Assist x 1; Impulsive;Verbal cues   Stand to Sit 4  Minimal assistance  (CGA for steadying)   Additional items Assist x 1;Verbal cues;Armrests; Impulsive Ambulation/Elevation   Gait pattern Ataxia; Short stride;Decreased foot clearance;Narrow VALERIE   Gait Assistance 4  Minimal assist  (CGA D/T INSTABILITY & ATAXIA)   Additional items Assist x 1;Verbal cues   Assistive Device None   Distance 5ft x1 during EOB to recliner chair transition  (Further ambulation deferred d/t pt's SOB and impulsivity with no AD  Recommend trial of use of RW next session )   Stair Management Assistance Not tested   Balance   Static Sitting Good   Dynamic Sitting Fair +   Static Standing Fair   Dynamic Standing Fair   Ambulatory Fair   Endurance Deficit   Endurance Deficit Yes   Endurance Deficit Description Pt on 4L O2 via NC  Pt insists on having NC into mouth rather than into nose, states this is how he does it normally  SPO2 maintained 90% + on 4L at rest and with exertion, however pt visibly SOB with mobility and conversation  Requires increased time for SOB recovery  Activity Tolerance   Activity Tolerance Patient limited by fatigue; Other (Comment)  (limited by SOB/DON)   Nurse Made Aware Spoke with GERARDO Carrion pre/post session   Assessment   Prognosis Fair   Problem List Decreased strength;Decreased endurance; Impaired balance;Decreased mobility; Decreased cognition; Impaired judgement;Decreased safety awareness;Decreased coordination   Assessment Pt seen for PT evaluation, cleared by GERARDO Carrion  PT consult received for mobility assessment & discharge needs  Pt admitted 6/9/2022 w/ worsening SOB and instability, dx Chronic obstructive pulmonary disease with acute exacerbation ([facility] [address] )  Comorbidities affecting pt's fnxl performance include: COPD, CHF, asthma, anxiety,  Bipolar disorder, depression  PTA, pt was independent with functional mobility with RW vs no AD for in room distance mobility at SNF, requiring assist for ADLS, requiring assist for IADLS and a resident of long term care   During PT IE, pt completes bed mobility independently, transfers and ambulates 5ft with no AD and CGA, significant SOB limiting pt's activity tolerance and mobility  The AM-PAC objective tool in combination w/ Barthel Index outcome tool were used to assist in determining pt safety w/ mobility/ADLs & appropriate d/c recommendations, see above for scores  Pt is at risk of falls d/t multiple comorbidities, impulsivity, impaired balance, impaired cognition, impaired insight/safety awareness, acuity of medical illness and ongoing medical treatment of primary dx  Pt's clinical presentation is currently unstable/unpredictable as seen in pt's presentation of vital sign response, varying levels of cognitive performance, increased fall risk, new onset of impairment of functional mobility, decreased endurance and new onset of weakness, and requires high complexity clinical decision making  Pt will benefit from continued PT treatment in order to address impairments, decrease risk of falls, maximize independence w/ fnxl mobility, & ensure safety w/ mobility for transition to next level of care  Based on pt presentation & impairments, pt would most appropriately benefit from return to facility with PT services  Goals   Patient Goals "to breath better"   [facility] Expiration Date 06/20/22   Short Term Goal #1 Patient PT goals established in order to address patient self reported goal of "to breathe better"   Pt will: complete all transfers with LRAD at [address] [place] [address] in order to increase safety with functional mobility; ambulate >30ft with LRAD at [facility] level in order to increase safety with short in room distance functional mobility; improve B LE strength to >/= 4/5 MMT t/o in order to increase safety with functional mobility and decrease risk of falls; improve ambulatory balance to >/= good grade with LRAD in order to promote safety and increased independence with mobility; tolerate >3hrs OOB in upright position, in order to improve muscular endurance and respiratory status; improve AM-PAC score to >/= 22/24 in order to increase independence with mobility and decrease burden of care; improve Barthel Index score to >/= 60/100 in order to increase independence and decrease risk of falls  PT Treatment Day 0   Plan   Treatment/Interventions Functional transfer training;LE strengthening/ROM; Therapeutic exercise; Endurance training;Cognitive reorientation;Patient/family training;Equipment eval/education;Gait training;Spoke to nursing   PT Frequency 2-3x/wk   Recommendation   PT Discharge Recommendation Return to facility with rehabilitation services   Additional Comments OF NOTE, DURING SESSION PATIENT ATTEMPTING DISCUSSION OF COMFORT MEASURES  DISCUSSED PATIENT HAVE THIS CONVERSATION WITH HIS PHYSICIANS, RN NOTIFED  AM-PAC Basic Mobility Inpatient   Turning in Bed Without Bedrails 4   Lying on Back to Sitting on Edge of Flat Bed 4   Moving Bed to Chair 3   Standing Up From Chair 3   Walk in Room 2   Climb 3-5 Stairs 1   Basic Mobility Inpatient Raw Score 17   Basic Mobility Standardized Score 39 67   Highest Level Of Mobility   -[facility] Goal 5: Stand one or more mins   -HL Achieved 5: Stand (1 or more minutes)   Modified Matt Scale   Modified Matt Scale 4   Barthel Index   Feeding 10   Bathing 0   Grooming Score 0   Dressing Score 5   Bladder Score 10   Bowels Score 10   Toilet Use Score 5   Transfers (Bed/Chair) Score 10   Mobility (Level Surface) Score 0   Stairs Score 0   Barthel Index Score 50   End of Consult   Patient Position at End of Consult Bedside chair;Bed/Chair alarm activated; All needs within reach   End of Consult Comments Based on patient's [facility] Level of Mobility scores today, patient currently has a goal of -[facility] Levels: 5: STAND (1 OR MORE MINUTES), to be completed with RN staffing each shift, in order to improve overall activity tolerance and mobility, combat hospital related deconditioning, and maximize outcomes for d/c from the acute care setting       The patient's AM-PAC Basic Mobility Inpatient Short Form Raw Score is 17  A Raw score of greater than 16 suggests the patient may benefit from discharge to home  Please also refer to the recommendation of the Physical Therapist for safe discharge planning        Jyotsna Shelley PT, DPT   Available via [employer]  [facility] # [phone]  [place] License - [id number]  [phone]

## 2022-06-11 PROBLEM — D72.829 LEUKOCYTOSIS: Status: ACTIVE | Noted: 2022-06-11

## 2022-06-11 LAB
ANION GAP SERPL CALCULATED.3IONS-SCNC: 10 MMOL/L (ref 4–13)
BASOPHILS # BLD AUTO: 0.03 THOUSANDS/ΜL (ref 0–0.1)
BASOPHILS NFR BLD AUTO: 0 % (ref 0–1)
BUN SERPL-MCNC: 39 MG/DL (ref 5–25)
CALCIUM SERPL-MCNC: 9.3 MG/DL (ref 8.4–10.2)
CHLORIDE SERPL-SCNC: 94 MMOL/L (ref 96–108)
CO2 SERPL-SCNC: 32 MMOL/L (ref 21–32)
CREAT SERPL-MCNC: 0.93 MG/DL (ref 0.6–1.3)
EOSINOPHIL # BLD AUTO: 0 THOUSAND/ΜL (ref 0–0.61)
EOSINOPHIL NFR BLD AUTO: 0 % (ref 0–6)
ERYTHROCYTE [DISTWIDTH] IN BLOOD BY AUTOMATED COUNT: 11.9 % (ref 11.6–15.1)
GFR SERPL CREATININE-BSD FRML MDRD: 81 ML/MIN/1.73SQ M
GLUCOSE SERPL-MCNC: 103 MG/DL (ref 65–140)
HCT VFR BLD AUTO: 39.6 % (ref 36.5–49.3)
HGB BLD-MCNC: 13.4 G/DL (ref 12–17)
IMM GRANULOCYTES # BLD AUTO: 0.07 THOUSAND/UL (ref 0–0.2)
IMM GRANULOCYTES NFR BLD AUTO: 1 % (ref 0–2)
LYMPHOCYTES # BLD AUTO: 0.86 THOUSANDS/ΜL (ref 0.6–4.47)
LYMPHOCYTES NFR BLD AUTO: 8 % (ref 14–44)
MCH RBC QN AUTO: 30.6 PG (ref 26.8–34.3)
MCHC RBC AUTO-ENTMCNC: 33.8 G/DL (ref 31.4–37.4)
MCV RBC AUTO: 90 FL (ref 82–98)
MONOCYTES # BLD AUTO: 0.75 THOUSAND/ΜL (ref 0.17–1.22)
MONOCYTES NFR BLD AUTO: 7 % (ref 4–12)
NEUTROPHILS # BLD AUTO: 9.54 THOUSANDS/ΜL (ref 1.85–7.62)
NEUTS SEG NFR BLD AUTO: 84 % (ref 43–75)
NRBC BLD AUTO-RTO: 0 /100 WBCS
PLATELET # BLD AUTO: 316 THOUSANDS/UL (ref 149–390)
PMV BLD AUTO: 10.6 FL (ref 8.9–12.7)
POTASSIUM SERPL-SCNC: 3.7 MMOL/L (ref 3.5–5.3)
RBC # BLD AUTO: 4.38 MILLION/UL (ref 3.88–5.62)
SODIUM SERPL-SCNC: 136 MMOL/L (ref 135–147)
WBC # BLD AUTO: 11.25 THOUSAND/UL (ref 4.31–10.16)

## 2022-06-11 PROCEDURE — 99232 SBSQ HOSP IP/OBS MODERATE 35: CPT | Performed by: INTERNAL MEDICINE

## 2022-06-11 PROCEDURE — 80048 BASIC METABOLIC PNL TOTAL CA: CPT

## 2022-06-11 PROCEDURE — 85025 COMPLETE CBC W/AUTO DIFF WBC: CPT

## 2022-06-11 RX ADMIN — LAMOTRIGINE 50 MG: 100 TABLET ORAL at 09:17

## 2022-06-11 RX ADMIN — ALBUTEROL SULFATE 2 PUFF: 90 AEROSOL, METERED RESPIRATORY (INHALATION) at 04:27

## 2022-06-11 RX ADMIN — BENZONATATE 100 MG: 100 CAPSULE ORAL at 21:39

## 2022-06-11 RX ADMIN — SENNOSIDES 8.6 MG: 8.6 TABLET, FILM COATED ORAL at 09:16

## 2022-06-11 RX ADMIN — GUAIFENESIN 1200 MG: 600 TABLET ORAL at 21:39

## 2022-06-11 RX ADMIN — GUAIFENESIN 1200 MG: 600 TABLET ORAL at 09:16

## 2022-06-11 RX ADMIN — HEPARIN SODIUM 5000 UNITS: 5000 INJECTION INTRAVENOUS; SUBCUTANEOUS at 06:47

## 2022-06-11 RX ADMIN — BUSPIRONE HYDROCHLORIDE 10 MG: 5 TABLET ORAL at 17:37

## 2022-06-11 RX ADMIN — METHYLPREDNISOLONE SODIUM SUCCINATE 40 MG: 40 INJECTION, POWDER, FOR SOLUTION INTRAMUSCULAR; INTRAVENOUS at 09:16

## 2022-06-11 RX ADMIN — BENZONATATE 100 MG: 100 CAPSULE ORAL at 01:06

## 2022-06-11 RX ADMIN — BUSPIRONE HYDROCHLORIDE 10 MG: 5 TABLET ORAL at 09:16

## 2022-06-11 RX ADMIN — DOCUSATE SODIUM 100 MG: 100 CAPSULE, LIQUID FILLED ORAL at 09:16

## 2022-06-11 RX ADMIN — ALBUTEROL SULFATE 2 PUFF: 90 AEROSOL, METERED RESPIRATORY (INHALATION) at 21:39

## 2022-06-11 RX ADMIN — METHYLPREDNISOLONE SODIUM SUCCINATE 40 MG: 40 INJECTION, POWDER, FOR SOLUTION INTRAMUSCULAR; INTRAVENOUS at 21:39

## 2022-06-11 RX ADMIN — PANTOPRAZOLE SODIUM 40 MG: 40 TABLET, DELAYED RELEASE ORAL at 06:47

## 2022-06-11 RX ADMIN — HEPARIN SODIUM 5000 UNITS: 5000 INJECTION INTRAVENOUS; SUBCUTANEOUS at 14:44

## 2022-06-11 RX ADMIN — PAROXETINE 10 MG: 20 TABLET, FILM COATED ORAL at 09:17

## 2022-06-11 RX ADMIN — HEPARIN SODIUM 5000 UNITS: 5000 INJECTION INTRAVENOUS; SUBCUTANEOUS at 21:39

## 2022-06-11 RX ADMIN — AZITHROMYCIN MONOHYDRATE 500 MG: 250 TABLET ORAL at 12:02

## 2022-06-11 NOTE — DISCHARGE INSTR - AVS FIRST PAGE
Dear Angela Nogueira,     It was our pleasure to care for you here at [facility]  It is our hope that we were always able to exceed the expected standards for your care during your stay  You were hospitalized due to COPD exacerbation  You were cared for on the second floor by Jolie Joshua MD under the service of Britta Ibarra MD with the [facility] who covers for your primary care physician (PCP), Viviane Dubon MD, while you were hospitalized  If you have any questions or concerns related to this hospitalization, you may contact us at [phone]  For follow up as well as any medication refills, we recommend that you follow up with your primary care physician  A registered nurse will reach out to you by phone within a few days after your discharge to answer any additional questions that you may have after going home  However, at this time we provide for you here, the most important instructions / recommendations at discharge:     Notable Medication Adjustments -   Prednisone taper: 40 mg for 3 more days, then take 30 mg for 4 days, then take 20 mg for 4 days, 10 mg for 4 days  Testing Required after Discharge -   None   Important follow up information -   Follow-up with PCP, especially regarding cholelithiasis seen on imaging  Other Instructions -   None   Please review this entire after visit summary as additional general instructions including medication list, appointments, activity, diet, any pertinent wound care, and other additional recommendations from your care team that may be provided for you        Sincerely,     Jolie Joshua MD

## 2022-06-11 NOTE — PLAN OF CARE
Problem: Potential for Falls  Goal: Patient will remain free of falls  Description: INTERVENTIONS:  - Educate patient/family on patient safety including physical limitations  - Instruct patient to call for assistance with activity   - Consult OT/PT to assist with strengthening/mobility   - Keep Call bell within reach  - Keep bed low and locked with side rails adjusted as appropriate  - Keep care items and personal belongings within reach  - Initiate and maintain comfort rounds  - Make Fall Risk Sign visible to staff    - Apply yellow socks and bracelet for high fall risk patients  - Consider moving patient to room near nurses station  Outcome: Progressing     Problem: MOBILITY - ADULT  Goal: Maintain or return to baseline ADL function  Description: INTERVENTIONS:  -  Assess patient's ability to carry out ADLs; assess patient's baseline for ADL function and identify physical deficits which impact ability to perform ADLs (bathing, care of mouth/teeth, toileting, grooming, dressing, etc )  - Assess/evaluate cause of self-care deficits   - Assess range of motion  - Assess patient's mobility; develop plan if impaired  - Assess patient's need for assistive devices and provide as appropriate  - Encourage maximum independence but intervene and supervise when necessary  - Involve family in performance of ADLs  - Assess for home care needs following discharge   - Consider OT consult to assist with ADL evaluation and planning for discharge  - Provide patient education as appropriate  Outcome: Progressing  Goal: Maintains/Returns to pre admission functional level  Description: INTERVENTIONS:  - Perform BMAT or MOVE assessment daily    - Set and communicate daily mobility goal to care team and patient/family/caregiver     - Collaborate with rehabilitation services on mobility goals if consulted    - Out of bed for toileting  - Record patient progress and toleration of activity level   Outcome: Progressing     Problem: Prexisting or High Potential for Compromised Skin Integrity  Goal: Skin integrity is maintained or improved  Description: INTERVENTIONS:  - Identify patients at risk for skin breakdown  - Assess and monitor skin integrity  - Assess and monitor nutrition and hydration status  - Monitor labs   - Assess for incontinence   - Turn and reposition patient  - Assist with mobility/ambulation  - Relieve pressure over bony prominences  - Avoid friction and shearing  - Provide appropriate hygiene as needed including keeping skin clean and dry  - Evaluate need for skin moisturizer/barrier cream  - Collaborate with interdisciplinary team   - Patient/family teaching  - Consider wound care consult   Outcome: Progressing

## 2022-06-11 NOTE — INCIDENTAL FINDINGS
The following findings require follow up:  Radiographic finding   Finding: Cholelithiasis    Follow up required: outpatient with PCP    Follow up should be done within few weeks of discharge  CTA chest with contrast done on 06/09/2022 to rule out possible PE, pneumothorax showed finding suggestive of cholelithiasis in upper abdomen  Of note, patient reports no abdominal discomfort and his abdominal exam is benign  He can follow-up outpatient with PCP

## 2022-06-11 NOTE — PROGRESS NOTES
[facility]  Progress Note James NARVAEZ [facility] 1950, 66 y o  male MRN: [id number]  Unit/Bed#: S -01 Encounter: [id number]  Primary Care Provider: Michael Ricardo MD   Date and time admitted to hospital: 6/9/2022  5:20 AM    * Chronic obstructive pulmonary disease with acute exacerbation ([facility] [address] )  Assessment & Plan  · Present on admission with cough, shortness of breath, respiratory failure  · Required BiPAP in ED  · Home regimen: 3 L of oxygen NC, Trelegy, prednisone 5 mg daily, ipratropium albuterol 5 times daily and q 6h p r n  Symptoms: acute dyspnea and chronic dyspnea  COVID: negative , Vaccinated: unknown    Last PFT: unknown    Smoking Status: unknown    CXR shows changes consistent w/ COPD   Procal 0 38    Lactic Acid 1 5    ABG:  PH 7 34, CO2 56 8, O2 88 4, bicarb 30 4   BC: NG at 48hrs    Plan:   IV Solu-Medrol:  40 IV q 12 h; taper as appropriate to oral steroids   Inhalers: Duo-Nebs q 6h p r n   Currently on 3 L NC (baseline), Goal O2 sat 88-92% , wean as appropriate   BiPAP p r n   · Azithromycin 500 mg Q 24 H  · Mucinex 1200 mg q 12h   Monitor respiratory status      Leukocytosis  Assessment & Plan  Lab Results   Component Value Date    WBC 11 25 (H) 06/11/2022    WBC 9 18 06/10/2022    WBC 9 48 06/09/2022     · Likely in setting of IV steroid use  · No fevers, clinical signs of infection otherwise  · Monitor WBC    Acute on chronic respiratory failure with hypoxia and hypercapnia (HCC)  Assessment & Plan  · Seen on VBG  · Improving with supplemental oxygen and plan above  · Monitor respiratory status    Elevated troponin  Assessment & Plan  · Non chest pain associated elevation in troponin  EKG 6/9: NSR  · Etiology:  Likely in setting of respiratory failure    · No interventions required for now  · Monitor vitals, hemodynamics    Abnormal computed tomography angiography (CTA)  Assessment & Plan  · Noted to have cholelithiasis   · Follow up as an outpatient · Currently asymptomatic and benign abdominal exam    Gastroesophageal reflux disease without esophagitis  Assessment & Plan  · Continue PPI and tums PRN    Bipolar disorder ([address] )  Assessment & Plan  · Continue home meds: lamotrigine    Anxiety  Assessment & Plan  · Continue Paxil, Buspar    VTE Pharmacologic Prophylaxis: VTE Score: 5 High Risk (Score >/= 5) - Pharmacological DVT Prophylaxis Ordered: heparin  Sequential Compression Devices Ordered  Patient Centered Rounds: I performed bedside rounds with nursing staff today  Discussions with Specialists or Other Care Team Provider: none     Education and Discussions with Family / Patient: Patient declined call to   Current Length of Stay: 2 day(s)  Current Patient Status: Inpatient   Discharge Plan: Anticipate discharge in 24-48 hrs to rehab facility  ([facility])    Code Status: Level 3 - DNAR and DNI    Subjective:   Patient seen and examined at bedside  No significant events overnight  Patient continues to endorse chest tightness, however, does not report of breath  He tells me he has more of an appetite since yesterday  He denies chest pain, abdominal pain, nausea, vomiting, fever, chills, headache, dizziness or palpitations  Objective:     Vitals:   Temp (24hrs), Av 5 °F (36 4 °C), Min:97 1 °F (36 2 °C), Max:97 8 °F (36 6 °C)    Temp:  [97 1 °F (36 2 °C)-97 8 °F (36 6 °C)] 97 4 °F (36 3 °C)  HR:  [64-76] 64  Resp:  [16-18] 16  BP: (124-135)/(70-78) 127/74  SpO2:  [91 %-98 %] 93 %  Body mass index is 20 39 kg/m²  Input and Output Summary (last 24 hours): Intake/Output Summary (Last 24 hours) at 2022 1148  Last data filed at 2022 0700  Gross per 24 hour   Intake --   Output 975 ml   Net -975 ml     Physical Exam   Vitals reviewed  General Examination: Lying in bed, cooperative   HEENT: Normocephalic, Atraumatic  CVS: S1, S2 noted  Lungs:  Diffuse wheezing in all 4 lung quadrants   On 3 LO2 NC, sats in high 90s   Abdomen: Soft, normal bowel sounds  Non distended, non tender  Ext: No edema noted  Psych: Though Process - logical    Skin: No bleeding/bruising noted  Neuro: A, Ox3  Appropriate antigravity strength in all 4 extremities proximally, distally  Additional Data:   Labs:  Results from last 7 days   Lab Units 06/11/22  0542 06/10/22  0431 06/09/22  0538   WBC Thousand/uL 11 25*   < > 9 48   HEMOGLOBIN g/dL 13 4   < > 13 3   HEMATOCRIT % 39 6   < > 40 0   PLATELETS Thousands/uL 316   < > 308   BANDS PCT %  --   --  24*   NEUTROS PCT % 84*  --   --    LYMPHS PCT % 8*  --   --    LYMPHO PCT %  --   --  8*   MONOS PCT % 7  --   --    MONO PCT %  --   --  15*   EOS PCT % 0  --  0    < > = values in this interval not displayed  Results from last 7 days   Lab Units 06/11/22  0542 06/10/22  0431 06/09/22  0538   SODIUM mmol/L 136   < > 133*   POTASSIUM mmol/L 3 7   < > 3 6   CHLORIDE mmol/L 94*   < > 91*   CO2 mmol/L 32   < > 32   BUN mg/dL 39*   < > 15   CREATININE mg/dL 0 93   < > 0 71   ANION GAP mmol/L 10   < > 10   CALCIUM mg/dL 9 3   < > 9 2   ALBUMIN g/dL  --   --  4 2   TOTAL BILIRUBIN mg/dL  --   --  0 70   ALK PHOS U/L  --   --  55   ALT U/L  --   --  32   AST U/L  --   --  53*   GLUCOSE RANDOM mg/dL 103   < > 137    < > = values in this interval not displayed  Results from last 7 days   Lab Units 06/09/22  0538   INR  1 08             Results from last 7 days   Lab Units 06/09/22  0538   LACTIC ACID mmol/L 1 5   PROCALCITONIN ng/ml 0 38*       Lines/Drains:  Invasive Devices  Report    Peripheral Intravenous Line  Duration           Peripheral IV 06/09/22 Left Antecubital 2 days                Imaging:   Reviewed imaging reports from this admission including:  CTA ED chest PE Study   Final Result by Jony Tam MD (06/09 0757)      No evidence of pulmonary embolus  Severe emphysema  Cholelithiasis        Workstation performed: [id number]         XR chest 1 view portable Final Result by Janina Arrington DO (06/09 2268)      COPD  Clear lungs  Workstation performed: [id number]           Recent Cultures (last 7 days):   Results from last 7 days   Lab Units 06/09/22  0536 06/09/22  0535   BLOOD CULTURE  No Growth at 48 hrs  No Growth at 48 hrs  Last 24 Hours Medication List:   Current Facility-Administered Medications   Medication Dose Route Frequency Provider Last Rate    acetaminophen  650 mg Oral Q6H PRN Rohini Rose MD      albuterol  2 puff Inhalation Q4H PRN Jean-Pierre Hood MD      azithromycin  500 mg Oral Q24H Rohini Rose MD      benzonatate  100 mg Oral TID PRN Rohini Rose MD      busPIRone  10 mg Oral BID Rohini Rose MD      calcium carbonate  500 mg Oral Q6H PRN Rohini Rose MD      docusate sodium  100 mg Oral BID Rohini Rose MD      guaiFENesin  1,200 mg Oral Q12H [address], [place]      heparin (porcine)  5,000 Units Subcutaneous [facility] Rohini Rose MD      ipratropium-albuterol  3 mL Nebulization Q6H PRN Rohini Rose MD      lamoTRIgine  50 mg Oral Daily Rohini Rose MD      methylPREDNISolone sodium succinate  40 mg Intravenous Q12H 1000 [facility] [facility], MD      pantoprazole  40 mg Oral Early Morning Rohini Rose MD      PARoxetine  10 mg Oral Daily Rohini Rose MD      senna  1 tablet Oral Daily Rohini Rose MD          Today, Patient Was Seen By: Nanette Rasmussen MD    **Please Note: This note may have been constructed using a voice recognition system  **

## 2022-06-11 NOTE — ASSESSMENT & PLAN NOTE
Lab Results   Component Value Date    WBC 11 25 (H) 06/11/2022    WBC 9 18 06/10/2022    WBC 9 48 06/09/2022     · Likely in setting of IV steroid use  · No fevers, clinical signs of infection otherwise  · Monitor WBC

## 2022-06-12 VITALS
HEART RATE: 78 BPM | OXYGEN SATURATION: 96 % | SYSTOLIC BLOOD PRESSURE: 130 MMHG | DIASTOLIC BLOOD PRESSURE: 74 MMHG | RESPIRATION RATE: 18 BRPM | TEMPERATURE: 97.5 F

## 2022-06-12 PROBLEM — Z71.89 GOALS OF CARE, COUNSELING/DISCUSSION: Status: ACTIVE | Noted: 2022-06-08

## 2022-06-12 PROBLEM — R06.02 SHORTNESS OF BREATH: Status: ACTIVE | Noted: 2022-06-08

## 2022-06-12 LAB
ANION GAP SERPL CALCULATED.3IONS-SCNC: 9 MMOL/L (ref 4–13)
BASOPHILS # BLD AUTO: 0.03 THOUSANDS/ΜL (ref 0–0.1)
BASOPHILS NFR BLD AUTO: 0 % (ref 0–1)
BUN SERPL-MCNC: 38 MG/DL (ref 5–25)
CALCIUM SERPL-MCNC: 8.9 MG/DL (ref 8.4–10.2)
CHLORIDE SERPL-SCNC: 94 MMOL/L (ref 96–108)
CO2 SERPL-SCNC: 32 MMOL/L (ref 21–32)
CREAT SERPL-MCNC: 0.85 MG/DL (ref 0.6–1.3)
EOSINOPHIL # BLD AUTO: 0 THOUSAND/ΜL (ref 0–0.61)
EOSINOPHIL NFR BLD AUTO: 0 % (ref 0–6)
ERYTHROCYTE [DISTWIDTH] IN BLOOD BY AUTOMATED COUNT: 11.9 % (ref 11.6–15.1)
GFR SERPL CREATININE-BSD FRML MDRD: 87 ML/MIN/1.73SQ M
GLUCOSE SERPL-MCNC: 112 MG/DL (ref 65–140)
HCT VFR BLD AUTO: 40.3 % (ref 36.5–49.3)
HGB BLD-MCNC: 13.3 G/DL (ref 12–17)
IMM GRANULOCYTES # BLD AUTO: 0.13 THOUSAND/UL (ref 0–0.2)
IMM GRANULOCYTES NFR BLD AUTO: 1 % (ref 0–2)
LYMPHOCYTES # BLD AUTO: 0.58 THOUSANDS/ΜL (ref 0.6–4.47)
LYMPHOCYTES NFR BLD AUTO: 5 % (ref 14–44)
MCH RBC QN AUTO: 29.8 PG (ref 26.8–34.3)
MCHC RBC AUTO-ENTMCNC: 33 G/DL (ref 31.4–37.4)
MCV RBC AUTO: 90 FL (ref 82–98)
MONOCYTES # BLD AUTO: 0.32 THOUSAND/ΜL (ref 0.17–1.22)
MONOCYTES NFR BLD AUTO: 3 % (ref 4–12)
NEUTROPHILS # BLD AUTO: 11.45 THOUSANDS/ΜL (ref 1.85–7.62)
NEUTS SEG NFR BLD AUTO: 91 % (ref 43–75)
NRBC BLD AUTO-RTO: 0 /100 WBCS
PLATELET # BLD AUTO: 344 THOUSANDS/UL (ref 149–390)
PMV BLD AUTO: 10.6 FL (ref 8.9–12.7)
POTASSIUM SERPL-SCNC: 4.3 MMOL/L (ref 3.5–5.3)
RBC # BLD AUTO: 4.46 MILLION/UL (ref 3.88–5.62)
SODIUM SERPL-SCNC: 135 MMOL/L (ref 135–147)
WBC # BLD AUTO: 12.51 THOUSAND/UL (ref 4.31–10.16)

## 2022-06-12 PROCEDURE — 85025 COMPLETE CBC W/AUTO DIFF WBC: CPT

## 2022-06-12 PROCEDURE — 80048 BASIC METABOLIC PNL TOTAL CA: CPT

## 2022-06-12 PROCEDURE — 94664 DEMO&/EVAL PT USE INHALER: CPT

## 2022-06-12 PROCEDURE — 94760 N-INVAS EAR/PLS OXIMETRY 1: CPT

## 2022-06-12 PROCEDURE — 94668 MNPJ CHEST WALL SBSQ: CPT

## 2022-06-12 PROCEDURE — 94640 AIRWAY INHALATION TREATMENT: CPT

## 2022-06-12 PROCEDURE — 99233 SBSQ HOSP IP/OBS HIGH 50: CPT | Performed by: INTERNAL MEDICINE

## 2022-06-12 RX ORDER — PREDNISONE 20 MG/1
20 TABLET ORAL DAILY
Status: DISCONTINUED | OUTPATIENT
Start: 2022-06-19 | End: 2022-06-13 | Stop reason: HOSPADM

## 2022-06-12 RX ORDER — FORMOTEROL FUMARATE 20 UG/2ML
20 SOLUTION RESPIRATORY (INHALATION)
Status: DISCONTINUED | OUTPATIENT
Start: 2022-06-12 | End: 2022-06-13 | Stop reason: HOSPADM

## 2022-06-12 RX ORDER — PREDNISONE 20 MG/1
40 TABLET ORAL DAILY
Status: DISCONTINUED | OUTPATIENT
Start: 2022-06-13 | End: 2022-06-13 | Stop reason: HOSPADM

## 2022-06-12 RX ORDER — DIPHENHYDRAMINE HCL 25 MG
25 TABLET ORAL EVERY 8 HOURS PRN
Status: DISCONTINUED | OUTPATIENT
Start: 2022-06-12 | End: 2022-06-13 | Stop reason: HOSPADM

## 2022-06-12 RX ORDER — IPRATROPIUM BROMIDE AND ALBUTEROL SULFATE 2.5; .5 MG/3ML; MG/3ML
3 SOLUTION RESPIRATORY (INHALATION) EVERY 4 HOURS PRN
Status: DISCONTINUED | OUTPATIENT
Start: 2022-06-12 | End: 2022-06-12

## 2022-06-12 RX ORDER — METHYLPREDNISOLONE SODIUM SUCCINATE 40 MG/ML
40 INJECTION, POWDER, LYOPHILIZED, FOR SOLUTION INTRAMUSCULAR; INTRAVENOUS EVERY 8 HOURS SCHEDULED
Status: DISCONTINUED | OUTPATIENT
Start: 2022-06-12 | End: 2022-06-12

## 2022-06-12 RX ORDER — DIPHENHYDRAMINE HYDROCHLORIDE, ZINC ACETATE 2; .1 G/100G; G/100G
CREAM TOPICAL 3 TIMES DAILY PRN
Status: DISCONTINUED | OUTPATIENT
Start: 2022-06-12 | End: 2022-06-12

## 2022-06-12 RX ORDER — PREDNISONE 10 MG/1
10 TABLET ORAL DAILY
Status: DISCONTINUED | OUTPATIENT
Start: 2022-06-22 | End: 2022-06-13 | Stop reason: HOSPADM

## 2022-06-12 RX ADMIN — IPRATROPIUM BROMIDE 0.5 MG: 0.5 SOLUTION RESPIRATORY (INHALATION) at 14:05

## 2022-06-12 RX ADMIN — BUSPIRONE HYDROCHLORIDE 10 MG: 5 TABLET ORAL at 08:51

## 2022-06-12 RX ADMIN — PANTOPRAZOLE SODIUM 40 MG: 40 TABLET, DELAYED RELEASE ORAL at 05:09

## 2022-06-12 RX ADMIN — DOCUSATE SODIUM 100 MG: 100 CAPSULE, LIQUID FILLED ORAL at 08:51

## 2022-06-12 RX ADMIN — SENNOSIDES 8.6 MG: 8.6 TABLET, FILM COATED ORAL at 08:51

## 2022-06-12 RX ADMIN — METHYLPREDNISOLONE SODIUM SUCCINATE 40 MG: 40 INJECTION, POWDER, FOR SOLUTION INTRAMUSCULAR; INTRAVENOUS at 05:10

## 2022-06-12 RX ADMIN — HEPARIN SODIUM 5000 UNITS: 5000 INJECTION INTRAVENOUS; SUBCUTANEOUS at 05:09

## 2022-06-12 RX ADMIN — PAROXETINE 10 MG: 20 TABLET, FILM COATED ORAL at 08:51

## 2022-06-12 RX ADMIN — HEPARIN SODIUM 5000 UNITS: 5000 INJECTION INTRAVENOUS; SUBCUTANEOUS at 22:06

## 2022-06-12 RX ADMIN — GUAIFENESIN 1200 MG: 600 TABLET ORAL at 20:12

## 2022-06-12 RX ADMIN — IPRATROPIUM BROMIDE 0.5 MG: 0.5 SOLUTION RESPIRATORY (INHALATION) at 20:01

## 2022-06-12 RX ADMIN — GUAIFENESIN 1200 MG: 600 TABLET ORAL at 08:51

## 2022-06-12 RX ADMIN — LAMOTRIGINE 50 MG: 100 TABLET ORAL at 08:51

## 2022-06-12 RX ADMIN — BUSPIRONE HYDROCHLORIDE 10 MG: 5 TABLET ORAL at 18:26

## 2022-06-12 RX ADMIN — DOCUSATE SODIUM 100 MG: 100 CAPSULE, LIQUID FILLED ORAL at 18:27

## 2022-06-12 RX ADMIN — METHYLPREDNISOLONE SODIUM SUCCINATE 40 MG: 40 INJECTION, POWDER, FOR SOLUTION INTRAMUSCULAR; INTRAVENOUS at 01:29

## 2022-06-12 RX ADMIN — FORMOTEROL FUMARATE DIHYDRATE 20 MCG: 20 SOLUTION RESPIRATORY (INHALATION) at 08:07

## 2022-06-12 RX ADMIN — DIPHENHYDRAMINE HCL 25 MG: 25 TABLET, COATED ORAL at 09:27

## 2022-06-12 RX ADMIN — HEPARIN SODIUM 5000 UNITS: 5000 INJECTION INTRAVENOUS; SUBCUTANEOUS at 13:35

## 2022-06-12 RX ADMIN — IPRATROPIUM BROMIDE 0.5 MG: 0.5 SOLUTION RESPIRATORY (INHALATION) at 07:52

## 2022-06-12 RX ADMIN — FORMOTEROL FUMARATE DIHYDRATE 20 MCG: 20 SOLUTION RESPIRATORY (INHALATION) at 20:01

## 2022-06-12 NOTE — ASSESSMENT & PLAN NOTE
· Patient has mentioned in the past that he would like to pursue hospice  · Has been evaluated by palliative care in the past   · Palliative care again evaluated today and new POLST was filled out  · Patient wishes to be DNI/DNR under hospice services  · Does not wish at this time to be [facility] as he states that if he thinks he could get more nebulizer treatments at the hospital which would make him more comfortable he would be willing to go  · Code status updated  · Palliative care will continue to follow and consult hospice

## 2022-06-12 NOTE — PROGRESS NOTES
[facility]  Progress Note Ishan NARVAEZ [facility] 1950, 66 y o  male MRN: [id number]  Unit/Bed#: S -01 Encounter: [id number]  Primary Care Provider: Junior Donaldson MD   Date and time admitted to hospital: 6/9/2022  5:20 AM    * Chronic obstructive pulmonary disease with acute exacerbation ([facility] )  Assessment & Plan  · Present on admission with cough, shortness of breath, respiratory failure  · Required BiPAP in ED  · Home regimen: 3 L of oxygen NC, Trelegy, prednisone 5 mg daily, ipratropium albuterol 5 times daily and q 6h p r n  Plan:   Will taper patient to steroids starting morning   Inhalers: Duo-Nebs q 6h p r n   Currently on 3 L NC (baseline), Goal O2 sat 88-92% , wean as appropriate   BiPAP p r n   · Azithromycin 500 mg Q 24 H  · Mucinex 1200 mg q 12h   Monitor respiratory status      Leukocytosis  Assessment & Plan  · likely in setting of IV steroid use  · No fevers, clinical signs of infection otherwise  · Monitor WBC    Elevated troponin  Assessment & Plan  · Non chest pain associated elevation in troponin  EKG 6/9: NSR  · Etiology:  Likely in setting of respiratory failure    · No interventions required for now  · Monitor vitals, hemodynamics    Abnormal computed tomography angiography (CTA)  Assessment & Plan  · Noted to have cholelithiasis   · Follow up as an outpatient   · Currently asymptomatic and benign abdominal exam    Gastroesophageal reflux disease without esophagitis  Assessment & Plan  · Continue PPI and tums PRN    Acute on chronic respiratory failure with hypoxia and hypercapnia (HCC)  Assessment & Plan  · Seen on VBG  · Improving with supplemental oxygen and plan above  · Monitor respiratory status    Bipolar disorder ([facility] )  Assessment & Plan  · Continue home meds: lamotrigine    Anxiety  Assessment & Plan  · Continue Paxil, Buspar        VTE Pharmacologic Prophylaxis: VTE Score: 5 High Risk (Score >/= 5) - Pharmacological DVT Prophylaxis Ordered: heparin  Sequential Compression Devices Ordered  Patient Centered Rounds: I performed bedside rounds with nursing staff today  Discussions with Specialists or Other Care Team Provider:     Education and Discussions with Family / Patient: Patient declined call to   Current Length of Stay: 3 day(s)  Current Patient Status: Inpatient   Discharge Plan: Anticipate discharge tomorrow to prior assisted or independent living facility  Code Status: Level 3 - DNAR and DNI    Subjective:   Patient states he is feeling better today  However, states his breathing is still on how he feels it should be, is more tired  No acute overnight events  No fevers or chills  Objective:     Vitals:   Temp (24hrs), Av 4 °F (36 3 °C), Min:97 2 °F (36 2 °C), Max:97 6 °F (36 4 °C)    Temp:  [97 2 °F (36 2 °C)-97 6 °F (36 4 °C)] 97 5 °F (36 4 °C)  HR:  [63-66] 63  Resp:  [17] 17  BP: (113-142)/(75-89) 131/79  SpO2:  [92 %-97 %] 93 %  Body mass index is 20 39 kg/m²  Input and Output Summary (last 24 hours): Intake/Output Summary (Last 24 hours) at 2022 1254  Last data filed at 2022 0801  Gross per 24 hour   Intake --   Output 700 ml   Net -700 ml       Physical Exam:   Physical Exam  Vitals and nursing note reviewed  Constitutional:       Appearance: He is well-developed  Comments: Chronically ill-appearing   HENT:      Head: Normocephalic and atraumatic  Eyes:      Conjunctiva/sclera: Conjunctivae normal    Cardiovascular:      Rate and Rhythm: Normal rate and regular rhythm  Heart sounds: No murmur heard  Pulmonary:      Effort: Pulmonary effort is normal  No respiratory distress  Breath sounds: Normal breath sounds  Abdominal:      Palpations: Abdomen is soft  Tenderness: There is no abdominal tenderness  Musculoskeletal:      Cervical back: Neck supple  Skin:     General: Skin is warm and dry  Capillary Refill: Capillary refill takes less than 2 seconds  Neurological:      General: No focal deficit present  Mental Status: He is alert and oriented to person, place, and time  Additional Data:     Labs:  Results from last 7 days   Lab Units 06/12/22  0432 06/10/22  0431 06/09/22  0538   WBC Thousand/uL 12 51*   < > 9 48   HEMOGLOBIN g/dL 13 3   < > 13 3   HEMATOCRIT % 40 3   < > 40 0   PLATELETS Thousands/uL 344   < > 308   BANDS PCT %  --   --  24*   NEUTROS PCT % 91*   < >  --    LYMPHS PCT % 5*   < >  --    LYMPHO PCT %  --   --  8*   MONOS PCT % 3*   < >  --    MONO PCT %  --   --  15*   EOS PCT % 0   < > 0    < > = values in this interval not displayed  Results from last 7 days   Lab Units 06/12/22  0432 06/10/22  0431 06/09/22  0538   SODIUM mmol/L 135   < > 133*   POTASSIUM mmol/L 4 3   < > 3 6   CHLORIDE mmol/L 94*   < > 91*   CO2 mmol/L 32   < > 32   BUN mg/dL 38*   < > 15   CREATININE mg/dL 0 85   < > 0 71   ANION GAP mmol/L 9   < > 10   CALCIUM mg/dL 8 9   < > 9 2   ALBUMIN g/dL  --   --  4 2   TOTAL BILIRUBIN mg/dL  --   --  0 70   ALK PHOS U/L  --   --  55   ALT U/L  --   --  32   AST U/L  --   --  53*   GLUCOSE RANDOM mg/dL 112   < > 137    < > = values in this interval not displayed  Results from last 7 days   Lab Units 06/09/22  0538   INR  1 08             Results from last 7 days   Lab Units 06/09/22  0538   LACTIC ACID mmol/L 1 5   PROCALCITONIN ng/ml 0 38*       Lines/Drains:  Invasive Devices  Report    Peripheral Intravenous Line  Duration           Peripheral IV 06/09/22 Left Antecubital 3 days                      Imaging: No pertinent imaging reviewed  Recent Cultures (last 7 days):   Results from last 7 days   Lab Units 06/09/22  0536 06/09/22  0535   BLOOD CULTURE  No Growth at 72 hrs  No Growth at 72 hrs         Last 24 Hours Medication List:   Current Facility-Administered Medications   Medication Dose Route Frequency Provider Last Rate    acetaminophen  650 mg Oral Q6H PRN Soo Daniels MD      albuterol  2 puff Inhalation Q4H PRN Aleks Mcclain MD      benzonatate  100 mg Oral TID PRN Allen Escobar MD      busPIRone  10 mg Oral BID Allen Escobar MD      calcium carbonate  500 mg Oral Q6H PRN Allen Escobar MD      diphenhydrAMINE  25 mg Oral Q8H PRN Ashu Souza MD      docusate sodium  100 mg Oral BID Allen Escobar MD      formoterol  20 mcg Nebulization Q12H Columba Miranda MD      guaiFENesin  1,200 mg Oral Q12H 1000 [facility] [facility], MD      heparin (porcine)  5,000 Units Subcutaneous [facility] Allen Escobar MD      ipratropium  0 5 mg Nebulization TID Columba Miranda MD      lamoTRIgine  50 mg Oral Daily Allen Escobar MD      pantoprazole  40 mg Oral Early Morning Allen Escobar MD      PARoxetine  10 mg Oral Daily MD Ruben Duffy ON 6/13/2022] predniSONE  40 mg Oral Daily Ashu Souza MD      Followed by   Yamilet Adrian ON 6/16/2022] predniSONE  30 mg Oral Daily Ashu Souza MD      Followed by   Yamilet Adrian ON 6/19/2022] predniSONE  20 mg Oral Daily Ashu Souza MD      Followed by   Yamilet Adrian ON 6/22/2022] predniSONE  10 mg Oral Daily Ashu Souza MD      senna  1 tablet Oral Daily Allen Escobar MD          Today, Patient Was Seen By: Ashu Souza MD    **Please Note: This note may have been constructed using a voice recognition system  **

## 2022-06-12 NOTE — ASSESSMENT & PLAN NOTE
· likely in setting of IV steroid use  · No fevers, clinical signs of infection otherwise  · Monitor WBC

## 2022-06-12 NOTE — ASSESSMENT & PLAN NOTE
· Patient noted to have shortness of breath on exam today  · Patient does have a known history of COPD and chronic shortness of breath   · Has refused further workup in the past as he does not wish to leave the facility to have the workup completed   · Was given albuterol inhaler overnight for shortness of breath   · Staff also obtained order from the on call for an additional DuoNeb which was administered at 0630  · Oxygen saturation at 0700 noted to be 84% on 4 L   · On exam, patient has improved oxygenation to 95% on 4 L   · Patient is noted to have a moist cough   · Expiratory wheezes noted on auscultation   · Ordered STAT BMP and CBC which were overall stable and did not indicate infection   · WBC count stable at 8 5  · STAT chest x-ray revealed no acute cardiopulmonary process  · Suspect COPD exacerbation   · Will order prednisone taper   · Prednisone 40 mg x 5 days, 30 mg x 5 days, 20 mg x 5 days, and 10 mg x 5 days   · Continue to keep oxygen saturation greater than 88%   · Continue to closely monitor

## 2022-06-12 NOTE — ASSESSMENT & PLAN NOTE
· Present on admission with cough, shortness of breath, respiratory failure  · Required BiPAP in ED  · Home regimen: 3 L of oxygen NC, Trelegy, prednisone 5 mg daily, ipratropium albuterol 5 times daily and q 6h p r n  Plan:   Will taper patient to steroids starting morning   Inhalers: Duo-Nebs q 6h p r n   Currently on 3 L NC (baseline), Goal O2 sat 88-92% , wean as appropriate     BiPAP p r n   · Azithromycin 500 mg Q 24 H  · Mucinex 1200 mg q 12h   Monitor respiratory status

## 2022-06-13 VITALS
RESPIRATION RATE: 16 BRPM | BODY MASS INDEX: 20.36 KG/M2 | HEIGHT: 72 IN | OXYGEN SATURATION: 94 % | TEMPERATURE: 97.5 F | WEIGHT: 150.35 LBS | DIASTOLIC BLOOD PRESSURE: 73 MMHG | SYSTOLIC BLOOD PRESSURE: 118 MMHG | HEART RATE: 59 BPM

## 2022-06-13 PROBLEM — J96.22 ACUTE ON CHRONIC RESPIRATORY FAILURE WITH HYPOXIA AND HYPERCAPNIA (HCC): Status: RESOLVED | Noted: 2020-07-15 | Resolved: 2022-06-13

## 2022-06-13 PROBLEM — J96.21 ACUTE ON CHRONIC RESPIRATORY FAILURE WITH HYPOXIA AND HYPERCAPNIA (HCC): Status: RESOLVED | Noted: 2020-07-15 | Resolved: 2022-06-13

## 2022-06-13 LAB
FLUAV RNA RESP QL NAA+PROBE: NEGATIVE
FLUBV RNA RESP QL NAA+PROBE: NEGATIVE
RSV RNA RESP QL NAA+PROBE: NEGATIVE
SARS-COV-2 RNA RESP QL NAA+PROBE: NEGATIVE

## 2022-06-13 PROCEDURE — 0241U HB NFCT DS VIR RESP RNA 4 TRGT: CPT

## 2022-06-13 PROCEDURE — 94760 N-INVAS EAR/PLS OXIMETRY 1: CPT

## 2022-06-13 PROCEDURE — 94640 AIRWAY INHALATION TREATMENT: CPT

## 2022-06-13 PROCEDURE — 99239 HOSP IP/OBS DSCHRG MGMT >30: CPT | Performed by: INTERNAL MEDICINE

## 2022-06-13 PROCEDURE — 91305 COVID-19 PFIZER VAC (TRIS SUCROSE, GRAY CAP FORM) 30 MCG/0.3ML SUSP: CPT

## 2022-06-13 RX ORDER — POLYETHYLENE GLYCOL 3350 17 G/17G
17 POWDER, FOR SOLUTION ORAL DAILY PRN
Status: DISCONTINUED | OUTPATIENT
Start: 2022-06-13 | End: 2022-06-13 | Stop reason: HOSPADM

## 2022-06-13 RX ORDER — PREDNISONE 20 MG/1
40 TABLET ORAL DAILY
Qty: 4 TABLET | Refills: 0 | Status: SHIPPED | OUTPATIENT
Start: 2022-06-14 | End: 2022-06-16

## 2022-06-13 RX ORDER — PREDNISONE 10 MG/1
10 TABLET ORAL DAILY
Qty: 3 TABLET | Refills: 0 | Status: SHIPPED | OUTPATIENT
Start: 2022-06-22 | End: 2022-06-25

## 2022-06-13 RX ORDER — PREDNISONE 10 MG/1
30 TABLET ORAL DAILY
Qty: 9 TABLET | Refills: 0 | Status: SHIPPED | OUTPATIENT
Start: 2022-06-16 | End: 2022-06-19

## 2022-06-13 RX ORDER — PREDNISONE 20 MG/1
20 TABLET ORAL DAILY
Qty: 3 TABLET | Refills: 0 | Status: SHIPPED | OUTPATIENT
Start: 2022-06-19 | End: 2022-06-22

## 2022-06-13 RX ADMIN — LAMOTRIGINE 50 MG: 100 TABLET ORAL at 09:30

## 2022-06-13 RX ADMIN — PANTOPRAZOLE SODIUM 40 MG: 40 TABLET, DELAYED RELEASE ORAL at 06:10

## 2022-06-13 RX ADMIN — BNT162B2 0.3 ML: 0.23 INJECTION, SUSPENSION INTRAMUSCULAR at 13:40

## 2022-06-13 RX ADMIN — IPRATROPIUM BROMIDE 0.5 MG: 0.5 SOLUTION RESPIRATORY (INHALATION) at 07:45

## 2022-06-13 RX ADMIN — BUSPIRONE HYDROCHLORIDE 10 MG: 5 TABLET ORAL at 09:30

## 2022-06-13 RX ADMIN — GUAIFENESIN 1200 MG: 600 TABLET ORAL at 09:30

## 2022-06-13 RX ADMIN — PAROXETINE 10 MG: 20 TABLET, FILM COATED ORAL at 09:31

## 2022-06-13 RX ADMIN — PREDNISONE 40 MG: 20 TABLET ORAL at 09:31

## 2022-06-13 RX ADMIN — HEPARIN SODIUM 5000 UNITS: 5000 INJECTION INTRAVENOUS; SUBCUTANEOUS at 06:10

## 2022-06-13 RX ADMIN — FORMOTEROL FUMARATE DIHYDRATE 20 MCG: 20 SOLUTION RESPIRATORY (INHALATION) at 07:45

## 2022-06-13 NOTE — DISCHARGE SUMMARY
[facility]  Discharge- Ethlyn Grounds 1950, 66 y o  male MRN: [id number]  Unit/Bed#: S -01 Encounter: [id number]  Primary Care Provider: eKo Gillis MD   Date and time admitted to hospital: 6/9/2022  5:20 AM    * Chronic obstructive pulmonary disease with acute exacerbation ([facility] )  Assessment & Plan  · Present on admission with cough, shortness of breath, respiratory failure  · Required BiPAP in ED  · Home regimen: 3 L of oxygen NC, Trelegy, prednisone 5 mg daily, ipratropium albuterol 5 times daily and q 6h p r n  Plan:   Steroid taper upon discharge   Back to baseline O2 requirements- 3 L NC, Goal O2 sat 88-92%    · Azithromycin 500 mg Q 24 H- discontinued; patient got 3 days of azithromycin inpatient  · Follow-up with PCP within 1 week of discharge      Acute on chronic respiratory failure with hypoxia and hypercapnia (HCC)-resolved as of 6/13/2022  Assessment & Plan  · Seen on VBG  · Improved with supplemental oxygen and plan above  · Respiratory status back to baseline on day of discharge    Elevated troponin  Assessment & Plan  · Non chest pain associated elevation in troponin  EKG 6/9: NSR  · Etiology:  Likely in setting of respiratory failure  · No interventions required for now  · Patient noted to be hemodynamically stable  · Follow-up with PCP within 1 week discharge    Abnormal computed tomography angiography (CTA)  Assessment & Plan  · Noted to have cholelithiasis   · Follow up as an outpatient   · Currently asymptomatic and benign abdominal exam  · Patient counseled regarding imaging findings and importance of PCP follow-up      Gastroesophageal reflux disease without esophagitis  Assessment & Plan  · Continue PPI and tums PRN    Bipolar disorder ([place] [address] )  Assessment & Plan  · Continue home meds: lamotrigine    Anxiety  Assessment & Plan  · Continue Paxil, Buspar       Medical Problems             Resolved Problems  Date Reviewed: 6/8/2022          Resolved Acute on chronic respiratory failure with hypoxia and hypercapnia ([facility] [address] ) 6/13/2022     Resolved by  Tiffanei Leon MD    Overview Deleted 7/15/2020 11:21 PM by Jonathan Stallings MD                        Discharging Resident: Tiffanie Leon MD  Discharging Attending: No att  providers found  PCP: Jonathan Stallings MD  Admission Date:   Admission Orders (From admission, onward)     Ordered        06/09/22 1015  Inpatient Admission  Once                      Discharge Date: 06/13/22    Consultations During Hospital Stay:  · None     Procedures Performed:   · None     Significant Findings / Test Results:   CTA ED chest PE Study   Final Result by Naomi Vicente MD (06/09 0759)      No evidence of pulmonary embolus  Severe emphysema  Cholelithiasis  Workstation performed: [id number]         XR chest 1 view portable   Final Result by Tao Manjarrez DO (06/09 9963)      COPD  Clear lungs  Workstation performed: [id number]           Incidental Findings:   · Cholelithiasis seen on imaging  · Elevated troponin in setting of acute respiratory failure      Test Results Pending at Discharge (will require follow up): · Follow up with PCP within few weeks of discharge regarding cholelithiasis seen on imaging  Outpatient Tests Requested:  · None     Complications:  None     Reason for Admission: COPD exacerbation     Hospital Course:   Juventino Lezama is a 66 y o  male patient with pertinent past medical history of COPD who originally presented to the hospital on 6/9/2022 due to SOB, AMS from [facility]  Patient initially required BiPAP, ICU evaluation in the ED and was stabilized and admitted to medicine for further management  He was maintained on IV steroids, DuoNebs, given 3 days of azithromycin 500 mg and his respiratory status eventually improved  Patient was transitioned to p o  Steroids and discharged on a taper    Upon discharge, patient was also given COVID vaccine as per the requirements of [facility]  Of note, he was found to have cholelithiasis on imaging and was asked to follow-up with PCP outpatient  Please see above list of diagnoses and related plan for additional information  Condition at Discharge: fair    Discharge Day Visit / Exam:   Subjective:    Patient seen and examined at bedside  No significant events overnight  Denies chest pain, worsening SOB, chest tightness, orthopnea, abdominal pain, nausea vomiting, fever, chills, headache, dizziness or palpitations  Vitals: Blood Pressure: 118/73 (06/13/22 0743)  Pulse: 59 (06/13/22 0743)  Temperature: 97 5 °F (36 4 °C) (06/13/22 0743)  Temp Source: Axillary (06/11/22 2220)  Respirations: 16 (06/13/22 0743)  Height: 6' (182 9 cm) (06/09/22 0529)  Weight - Scale: 68 2 kg (150 lb 5 7 oz) (06/09/22 0529)  SpO2: 94 % (06/13/22 0748)    Physical Exam   Vitals reviewed  General Examination: Lying in bed, cooperative   HEENT: Normocephalic, Atraumatic  Extraocular movements intact, PERRLA  CVS: S1, S2 noted  Lungs: bibasilar wheezing, on 3L NC O2; saturating 95%  Abdomen: Soft, normal bowel sounds  Non distended, non tender  Ext: No edema noted  Psych: Though Process - logical    Skin: No bleeding/bruising noted  Neuro: A, Ox3  Follows simple, 3 steps commands  Appropriate antigravity strength in all 4 extremities proximally, distally  Discussion with Family: Patient declined call to   Discharge instructions/Information to patient and family:   See after visit summary for information provided to patient and family  Provisions for Follow-Up Care:  See after visit summary for information related to follow-up care and any pertinent home health orders  Disposition:   Other: [facility]    Planned Readmission: none     Discharge Medications:  See after visit summary for reconciled discharge medications provided to patient and/or family        **Please Note: This note may have been constructed using a voice recognition system**

## 2022-06-13 NOTE — PLAN OF CARE
Problem: RESPIRATORY - ADULT  Goal: Achieves optimal ventilation and oxygenation  Description: INTERVENTIONS:  - Assess for changes in respiratory status  - Assess for changes in mentation and behavior  - Position to facilitate oxygenation and minimize respiratory effort  - Oxygen administered by appropriate delivery if ordered  - Initiate smoking cessation education as indicated  - Encourage broncho-pulmonary hygiene including cough, deep breathe, Incentive Spirometry  - Assess the need for suctioning and aspirate as needed  - Assess and instruct to report SOB or any respiratory difficulty  - Respiratory Therapy support as indicated  Outcome: Progressing     Problem: Prexisting or High Potential for Compromised Skin Integrity  Goal: Skin integrity is maintained or improved  Description: INTERVENTIONS:  - Identify patients at risk for skin breakdown  - Assess and monitor skin integrity  - Assess and monitor nutrition and hydration status  - Monitor labs   - Assess for incontinence   - Turn and reposition patient  - Assist with mobility/ambulation  - Relieve pressure over bony prominences  - Avoid friction and shearing  - Provide appropriate hygiene as needed including keeping skin clean and dry  - Evaluate need for skin moisturizer/barrier cream  - Collaborate with interdisciplinary team   - Patient/family teaching  - Consider wound care consult   Outcome: Progressing     Problem: MOBILITY - ADULT  Goal: Maintain or return to baseline ADL function  Description: INTERVENTIONS:  -  Assess patient's ability to carry out ADLs; assess patient's baseline for ADL function and identify physical deficits which impact ability to perform ADLs (bathing, care of mouth/teeth, toileting, grooming, dressing, etc )  - Assess/evaluate cause of self-care deficits   - Assess range of motion  - Assess patient's mobility; develop plan if impaired  - Assess patient's need for assistive devices and provide as appropriate  - Encourage maximum independence but intervene and supervise when necessary  - Involve family in performance of ADLs  - Assess for home care needs following discharge   - Consider OT consult to assist with ADL evaluation and planning for discharge  - Provide patient education as appropriate  Outcome: Progressing     Problem: Potential for Falls  Goal: Patient will remain free of falls  Description: INTERVENTIONS:  - Educate patient/family on patient safety including physical limitations  - Instruct patient to call for assistance with activity   - Consult OT/PT to assist with strengthening/mobility   - Keep Call bell within reach  - Keep bed low and locked with side rails adjusted as appropriate  - Keep care items and personal belongings within reach  - Initiate and maintain comfort rounds  - Make Fall Risk Sign visible to staff  - Apply yellow socks and bracelet for high fall risk patients  - Consider moving patient to room near nurses station  Outcome: Progressing

## 2022-06-13 NOTE — CASE MANAGEMENT
Case Management Discharge Planning Note    Patient name [facility]oe [facility]  Location S /S -62 MRN [id number]  : 1950 Date 2022       Current Admission Date: 2022  Current Admission Diagnosis:Chronic obstructive pulmonary disease with acute exacerbation [facility])   Patient Active Problem List    Diagnosis Date Noted    Leukocytosis 2022    Abnormal computed tomography angiography (CTA) 2022    Elevated troponin 2022    Shortness of breath 2022    Goals of care, counseling/discussion 2022    Seborrheic dermatitis 2022    Protein calorie malnutrition ([facility] [address] ) 2021    Encounter for screening for malignant neoplasm of lung in former smoker who quit in past 15 years with 30 pack year history or greater 2021    Depression 10/29/2021    Hyponatremia     Personal history of tobacco use 2021    Vitamin D deficiency 2020    Allergies 2020    [facility] pulmonary [facility] with [facility] ) 07/15/2020    Anxiety 07/15/2020    Bipolar disorder ([facility] ) 07/15/2020    Acute on chronic respiratory failure with hypoxia and hypercapnia (HCC) 07/15/2020    Hypokalemia 07/15/2020    Gastroesophageal reflux disease without esophagitis 07/15/2020      LOS (days): 4  Geometric Mean LOS (GMLOS) (days): 3 60  Days to GMLOS:-0 4     OBJECTIVE:  Risk of Unplanned Readmission Score: 16 46         Current admission status: Inpatient   Preferred Pharmacy:   [phone] [facility] [address] [place] [address]  Phone: [phone] Fax: [phone]    Primary Care Provider: Elin Pleitez MD    Primary Insurance: MEDICARE  Secondary Insurance: [address] [place] [address]    DISCHARGE DETAILS:    Discharge planning discussed with[de-identified] patient  Freedom of Choice: Yes     CM contacted family/caregiver?: Yes  Were Treatment Team discharge recommendations reviewed with patient/caregiver?: Yes  Did patient/caregiver verbalize understanding of patient care needs?: Yes  Were patient/caregiver advised of the risks associated with not following Treatment Team discharge recommendations?: No- see comments    Contacts  Patient Contacts: Anthony Zaidi  Relationship to Patient[de-identified] Friend  Contact Method: Phone  Phone Number: [phone]  Reason/Outcome: Emergency Contact, Discharge Planning, Continuity of [facility]         Is the patient interested in [facility] at discharge?: No    DME Referral Provided  Referral made for DME?: No    Other Referral/Resources/Interventions Provided:  Interventions: Short Term Rehab  Referral Comments: REferral sent back to [facility] able to accept  will need COVID test prior to dc  Treatment Team Recommendation: SNF  Discharge Destination Plan[de-identified] SNF  Transport at Discharge : [facility]  Dispatcher Contacted: Yes        ETA of Transport (Date): 06/13/22   [facility] at 1330 by [facility]     Danyel confirmed transport for 1:30pm today with [facility]  CM informed patient and he is in agreement with dc back to [facility]  Danyel also called and spoke with shayna's emergency contact, Mirlande Lora and provided update on patient's  All parties aware and thankful for information

## 2022-06-14 ENCOUNTER — NURSING HOME VISIT (OUTPATIENT)
Dept: GERIATRICS | Facility: OTHER | Age: 72
End: 2022-06-14
Payer: MEDICARE

## 2022-06-14 VITALS
BODY MASS INDEX: 20.45 KG/M2 | HEART RATE: 78 BPM | SYSTOLIC BLOOD PRESSURE: 120 MMHG | RESPIRATION RATE: 20 BRPM | TEMPERATURE: 97.5 F | HEIGHT: 72 IN | OXYGEN SATURATION: 92 % | DIASTOLIC BLOOD PRESSURE: 70 MMHG | WEIGHT: 151 LBS

## 2022-06-14 DIAGNOSIS — F32.9 MAJOR DEPRESSIVE DISORDER WITH CURRENT ACTIVE EPISODE, UNSPECIFIED DEPRESSION EPISODE SEVERITY, UNSPECIFIED WHETHER RECURRENT: ICD-10-CM

## 2022-06-14 DIAGNOSIS — J44.1 CHRONIC OBSTRUCTIVE PULMONARY DISEASE WITH ACUTE EXACERBATION (HCC): Primary | ICD-10-CM

## 2022-06-14 DIAGNOSIS — F31.9 BIPOLAR AFFECTIVE DISORDER, REMISSION STATUS UNSPECIFIED (HCC): ICD-10-CM

## 2022-06-14 DIAGNOSIS — R77.8 ELEVATED TROPONIN: ICD-10-CM

## 2022-06-14 DIAGNOSIS — R93.89 ABNORMAL COMPUTED TOMOGRAPHY ANGIOGRAPHY (CTA): ICD-10-CM

## 2022-06-14 LAB
BACTERIA BLD CULT: NORMAL
BACTERIA BLD CULT: NORMAL

## 2022-06-14 PROCEDURE — 99306 1ST NF CARE HIGH MDM 50: CPT | Performed by: INTERNAL MEDICINE

## 2022-06-14 NOTE — PROGRESS NOTES
[address] [facility]  Fabio Carson MD FACP-AGSF History and Physical Gabo Gannon    NAME: Guerline Renner  AGE: 66 y o  SEX: male [phone]    DATE OF ENCOUNTER: 6/14/2022    Assessment and Plan     Problem List Items Addressed This Visit        Respiratory    Chronic obstructive pulmonary disease with acute exacerbation ([facility] [address] ) - Primary     Patient returns with a steroid taper he is back to baseline O2 requirements which are 3 L nasal cannula with a goal of maintaining O2 sats between 88 and 92%  Apparently patient was post to be evaluated by hospice last week but he was sent to the hospital before that happened  Hospice has been called to follow-up  Patient does not want any further pulmonary work and that is why hospice is being consulted  Other    Bipolar disorder [facility])     Patient is to continue on lamotrigine  Depression     Patient is to continue on Paxil and BuSpar           Abnormal computed tomography angiography (CTA)     Patient was noted to have evidence of cholelithiasis however the patient it was asymptomatic going on hospice no further workup is needed at present  Elevated troponin     Elevated troponin levels were noted in the hospital it was thought to be likely in the setting of respiratory failure  He required no interventions he remained hemodynamically stable  All medications and routine orders were reviewed and updated as needed  Plan discussed with: Patient    Chief Complaint     No chief complaint on file  dyspnea on exertion    History of Present Illness     HPI  Patient is a 76-year-old male that presented to the hospital on June 9, 2022 secondary to shortness of breath  Patient initially required BiPAP ICU ER evaluation  Patient was stabilized and admitted to the medical internal Medicine group for further management    Patient was maintained on IV steroids, Kelleybs was given 3 days of azithromycin 500 mg with improvement in his respiratory status  Subsequently was transitioned to p o  Steroids and discharged on a taper  Upon discharge she was also given a COVID vaccine as per the requirements of the [facility] facility  HISTORY:  No past surgical history on file  Past Medical History:   Diagnosis Date    Acute and chronic respiratory failure with hypoxia (HCC)     Asthma     CHF (congestive heart failure) (HCC)     COPD (chronic obstructive pulmonary disease) (HCC)     Delirium     Hypokalemia     Hyponatremia      Family History   Problem Relation Age of Onset    Brain cancer Mother     Heart disease Father      Social History     Socioeconomic History    Marital status: Single     Spouse name: Not on file    Number of children: Not on file    Years of education: Not on file    Highest education level: Not on file   Occupational History    Not on file   Tobacco Use    Smoking status: Former Smoker     Packs/day: 2 00     Types: Cigarettes     Quit date:      Years since quittin 4    Smokeless tobacco: Former User     Types: Chew   Vaping Use    Vaping Use: Former   Substance and Sexual Activity    Alcohol use: Not Currently     Comment: former social drinker    Drug use: Not Currently     Types: Marijuana     Comment: twice   Sexual activity: Not on file   Other Topics Concern    Not on file   Social History Narrative    Not on file     Social Determinants of Health     Financial Resource Strain: Not on file   Food Insecurity: No Food Insecurity    Worried About Running Out of Food in the Last Year: Never true    Radha of Food in the Last Year: Never true   Transportation Needs: No Transportation Needs    Lack of Transportation (Medical): No    Lack of Transportation (Non-Medical):  No   Physical Activity: Not on file   Stress: Not on file   Social Connections: Not on file   Intimate Partner Violence: Not on file   Housing Stability: Unknown    Unable to Pay for Housing in the Last Year: No    Number of Places Lived in the Last Year: Not on file    Unstable Housing in the Last Year: No       Allergies: Allergies   Allergen Reactions    Other Allergic Rhinitis     Seasonal Allergies    Penicillins        Review of Systems     Review of Systems   Constitutional: Negative  HENT: Negative  Eyes: Negative  Respiratory: Positive for shortness of breath  Cardiovascular: Negative  Gastrointestinal: Negative  Endocrine: Negative  Genitourinary: Negative  Musculoskeletal: Positive for arthralgias and gait problem  Allergic/Immunologic: Negative  Psychiatric/Behavioral: The patient is nervous/anxious          PHQ-2/9 Depression Screening           Medications and orders       Current Outpatient Medications:     acetaminophen (TYLENOL) 325 mg tablet, Take 650 mg by mouth every 6 (six) hours as needed, Disp: , Rfl:     albuterol (2 5 mg/3 mL) 0 083 % nebulizer solution, Take 2 5 mg by nebulization every 6 (six) hours as needed 2 puffs every 6 hours PRN, Disp: , Rfl:     benzonatate (TESSALON PERLES) 100 mg capsule, Take 100 mg by mouth 3 (three) times a day as needed, Disp: , Rfl:     busPIRone (BUSPAR) 10 mg tablet, Take 10 mg by mouth 2 (two) times a day, Disp: , Rfl:     calcium carbonate (TUMS) 500 mg chewable tablet, Chew 1 tablet every 6 (six) hours as needed, Disp: , Rfl:     diphenhydrAMINE (BENADRYL) 25 mg tablet, Take 25 mg by mouth every 6 (six) hours as needed for allergies, Disp: , Rfl:     ergocalciferol (ERGOCALCIFEROL) 1 25 MG (24125 UT) capsule, Take 50,000 Units by mouth once a week, Disp: , Rfl:     fluticasone-umeclidinium-vilanterol (TRELEGY) 100-62 5-25 MCG/INH inhaler, Inhale 1 puff daily Rinse mouth after use , Disp: , Rfl:     furosemide (LASIX) 40 mg tablet, Take 40 mg by mouth daily, Disp: , Rfl:     guaiFENesin (MUCINEX) 600 mg 12 hr tablet, Take 1,200 mg by mouth every 12 (twelve) hours, Disp: , Rfl:     ipratropium-albuterol (DUO-NEB) 0 5-2 5 mg/3 mL nebulizer solution, Take 3 mL by nebulization every 6 (six) hours as needed for shortness of breath or wheezing, Disp: , Rfl:     ketoconazole (NIZORAL) 2 % cream, Apply topically every 12 (twelve) hours Apply to face topically for rash; use for 4 weeks (started 3/02/2022), Disp: , Rfl:     lamoTRIgine (LaMICtal) 25 mg tablet, Take 50 mg by mouth daily, Disp: , Rfl:     LORazepam (ATIVAN) 0 5 mg tablet, Take 0 5 mg by mouth every 8 (eight) hours as needed for anxiety, Disp: , Rfl:     magnesium hydroxide (MILK OF MAGNESIA) 400 mg/5 mL oral suspension, Take by mouth daily as needed for constipation, Disp: , Rfl:     Morphine Sulfate, Concentrate, 20 mg/mL concentrated solution, Take 0 25 mL (5 mg total) by mouth every 6 (six) hours as needed for severe painMax Daily Amount: 20 mg, Disp: 60 mL, Rfl: 0    omeprazole (PriLOSEC) 20 mg delayed release capsule, Take 20 mg by mouth 2 (two) times a day, Disp: , Rfl:     PARoxetine (PAXIL) 20 mg tablet, Take 10 mg by mouth daily, Disp: , Rfl:     potassium chloride (K-DUR,KLOR-CON) 20 mEq tablet, Take 20 mEq by mouth daily, Disp: , Rfl:     [START ON 6/16/2022] predniSONE 10 mg tablet, Take 3 tablets (30 mg total) by mouth daily for 3 doses, Disp: 9 tablet, Rfl: 0    [START ON 6/22/2022] predniSONE 10 mg tablet, Take 1 tablet (10 mg total) by mouth daily for 3 doses, Disp: 3 tablet, Rfl: 0    predniSONE 20 mg tablet, Take 2 tablets (40 mg total) by mouth daily for 2 doses, Disp: 4 tablet, Rfl: 0    [START ON 6/19/2022] predniSONE 20 mg tablet, Take 1 tablet (20 mg total) by mouth daily for 3 doses, Disp: 3 tablet, Rfl: 0    Protein (PROSOURCE PO), Take 30 mL by mouth in the morning, Disp: , Rfl:     sodium chloride (OCEAN) 0 65 % nasal spray, 1 spray into each nostril every 12 (twelve) hours as needed, Disp: , Rfl:   No current facility-administered medications for this visit         Objective     Vitals:   Vitals:    06/14/22 1405   BP: 120/70   Pulse: 78   Resp: 20   Temp: 97 5 °F (36 4 °C)   SpO2: 92%   Weight: 68 5 kg (151 lb)   Height: 6' (1 829 m)       Physical Exam  Constitutional:       Comments: Patient appears cachectic   HENT:      Head: Normocephalic and atraumatic  Nose: Nose normal       Mouth/Throat:      Mouth: Mucous membranes are moist    Eyes:      Extraocular Movements: Extraocular movements intact  Conjunctiva/sclera: Conjunctivae normal       Pupils: Pupils are equal, round, and reactive to light  Cardiovascular:      Rate and Rhythm: Normal rate and regular rhythm  Heart sounds: Normal heart sounds  Comments: Decreased pulses  Pulmonary:      Breath sounds: Rhonchi present  Comments: Decreased breath sounds on inspiration bilaterally  Abdominal:      General: Bowel sounds are normal       Palpations: Abdomen is soft  Musculoskeletal:         General: Normal range of motion  Cervical back: Neck supple  Neurological:      Mental Status: He is oriented to person, place, and time  Mental status is at baseline  Psychiatric:         Thought Content: Thought content normal          Judgment: Judgment normal          Pertinent Laboratory/Diagnostic Studies: The following labs/studies were reviewed please see facility chart for details  No

## 2022-06-14 NOTE — ASSESSMENT & PLAN NOTE
Patient was noted to have evidence of cholelithiasis however the patient it was asymptomatic going on hospice no further workup is needed at present

## 2022-06-14 NOTE — ASSESSMENT & PLAN NOTE
· Present on admission with cough, shortness of breath, respiratory failure  · Required BiPAP in ED  · Home regimen: 3 L of oxygen NC, Trelegy, prednisone 5 mg daily, ipratropium albuterol 5 times daily and q 6h p r n  Plan:   Steroid taper upon discharge   Back to baseline O2 requirements- 3 L NC, Goal O2 sat 88-92%    · Azithromycin 500 mg Q 24 H- discontinued; patient got 3 days of azithromycin inpatient    · Follow-up with PCP within 1 week of discharge

## 2022-06-14 NOTE — ASSESSMENT & PLAN NOTE
· Seen on VBG  · Improved with supplemental oxygen and plan above  · Respiratory status back to baseline on day of discharge

## 2022-06-14 NOTE — ASSESSMENT & PLAN NOTE
Elevated troponin levels were noted in the hospital it was thought to be likely in the setting of respiratory failure  He required no interventions he remained hemodynamically stable

## 2022-06-14 NOTE — ASSESSMENT & PLAN NOTE
Patient returns with a steroid taper he is back to baseline O2 requirements which are 3 L nasal cannula with a goal of maintaining O2 sats between 88 and 92%  Apparently patient was post to be evaluated by hospice last week but he was sent to the hospital before that happened  Hospice has been called to follow-up  Patient does not want any further pulmonary work and that is why hospice is being consulted

## 2022-06-14 NOTE — ASSESSMENT & PLAN NOTE
· Non chest pain associated elevation in troponin  EKG 6/9: NSR  · Etiology:  Likely in setting of respiratory failure    · No interventions required for now  · Patient noted to be hemodynamically stable  · Follow-up with PCP within 1 week discharge

## 2022-06-14 NOTE — ASSESSMENT & PLAN NOTE
· Noted to have cholelithiasis   · Follow up as an outpatient   · Currently asymptomatic and benign abdominal exam  · Patient counseled regarding imaging findings and importance of PCP follow-up

## 2022-07-18 ENCOUNTER — NURSING HOME VISIT (OUTPATIENT)
Dept: GERIATRICS | Facility: OTHER | Age: 72
End: 2022-07-18

## 2022-07-18 ENCOUNTER — NURSING HOME VISIT (OUTPATIENT)
Dept: GERIATRICS | Facility: OTHER | Age: 72
End: 2022-07-18
Payer: MEDICARE

## 2022-07-18 DIAGNOSIS — F32.9 MAJOR DEPRESSIVE DISORDER WITH CURRENT ACTIVE EPISODE, UNSPECIFIED DEPRESSION EPISODE SEVERITY, UNSPECIFIED WHETHER RECURRENT: ICD-10-CM

## 2022-07-18 DIAGNOSIS — F31.9 BIPOLAR AFFECTIVE DISORDER, REMISSION STATUS UNSPECIFIED (HCC): ICD-10-CM

## 2022-07-18 DIAGNOSIS — E55.9 VITAMIN D DEFICIENCY: ICD-10-CM

## 2022-07-18 DIAGNOSIS — F41.9 ANXIETY: ICD-10-CM

## 2022-07-18 DIAGNOSIS — Z51.5 HOSPICE CARE PATIENT: Primary | ICD-10-CM

## 2022-07-18 DIAGNOSIS — Z87.891 PERSONAL HISTORY OF TOBACCO USE: ICD-10-CM

## 2022-07-18 DIAGNOSIS — J44.1 CHRONIC OBSTRUCTIVE PULMONARY DISEASE WITH ACUTE EXACERBATION (HCC): ICD-10-CM

## 2022-07-18 DIAGNOSIS — K21.9 GASTROESOPHAGEAL REFLUX DISEASE WITHOUT ESOPHAGITIS: ICD-10-CM

## 2022-07-18 DIAGNOSIS — R21 RASH AND NONSPECIFIC SKIN ERUPTION: Primary | ICD-10-CM

## 2022-07-18 PROCEDURE — 99307 SBSQ NF CARE SF MDM 10: CPT | Performed by: NURSE PRACTITIONER

## 2022-07-18 PROCEDURE — 99309 SBSQ NF CARE MODERATE MDM 30: CPT

## 2022-07-19 NOTE — ASSESSMENT & PLAN NOTE
Reports multiple non pruritic areas with erythema  Patient reports is nonpruritic however continue to scratching the area, areas appear bumpy, round with clear Center the area starts around the nose left cheek and down to left neck area  Agreeable to try hydrocortisone b i d  x5 days  Afebrile, vital signs stable appears non toxic  monitor

## 2022-07-19 NOTE — PROGRESS NOTES
Facility: [facility]                               POS: LTC 32                         Acute progress Note    Assessment/Plan: LTC acute visit    Rash and nonspecific skin eruption  Reports multiple non pruritic areas with erythema  Patient reports is nonpruritic however continue to scratching the area, areas appear bumpy, round with clear Center the area starts around the nose left cheek and down to left neck area  Agreeable to try hydrocortisone b i d  x5 days  Afebrile, vital signs stable appears non toxic  monitor     Subjective: "patient reporting facial red areas around nose, left cheek and down left cheek to upper neck area  Non-pruritic but it bothers "     Patient ID: Veronica Templeton is a 67 y o  male  HPI   67 year-old male seen and examined for acute LTC visit  Patient was shaved today  Nurse said no difficulties with shaving  However patient reports multiple non pruritic areas with erythema  He reports in non pruritic however he continues scratching the areas  Some area appeared bumpy, round with clear center  Will start him on hydrocortisone bid for 5 days  Agreeable  No other areas with rash  Afebrile, VSS and non toxic  No new medications started  Will continue to monitor  Per nurse no other concerns or issues at this time  Review of Systems   Constitutional: Negative for chills and fever  HENT: Negative for nosebleeds  Eyes: Negative for discharge  Respiratory: Negative for cough, shortness of breath and wheezing  Gastrointestinal: Negative  Genitourinary: Negative for hematuria and urgency  Musculoskeletal: Positive for back pain and gait problem  Negative for arthralgias  Skin: Positive for rash (nose, left cheek and left neck)  Neurological: Positive for weakness (generalized)  Negative for dizziness, light-headedness and headaches  Psychiatric/Behavioral: Negative for agitation and behavioral problems           Objective:  Weight 149 3 lb, blood pressure 126/68, pulse 73, respiration 18, temperature 97 3°, O2 99% room air   Physical Exam  Vitals and nursing note reviewed  Constitutional:       Appearance: He is not ill-appearing  HENT:      Head: Normocephalic and atraumatic  Mouth/Throat:      Mouth: Mucous membranes are moist    Eyes:      General:         Right eye: No discharge  Left eye: No discharge  Cardiovascular:      Rate and Rhythm: Normal rate and regular rhythm  Pulses: Normal pulses  Heart sounds: Normal heart sounds  Pulmonary:      Effort: Pulmonary effort is normal       Breath sounds: Normal breath sounds  No wheezing  Abdominal:      General: Bowel sounds are normal  There is no distension  Palpations: Abdomen is soft  Tenderness: There is no abdominal tenderness  There is no guarding  Musculoskeletal:      Cervical back: Neck supple  No rigidity or tenderness  Right lower leg: No edema  Left lower leg: No edema  Skin:     General: Skin is warm  Coloration: Skin is not jaundiced  Findings: Erythema and rash present  Comments: Rash around nose, left cheek and neck    Neurological:      General: No focal deficit present  Mental Status: He is alert  Motor: Weakness present     Psychiatric:         Mood and Affect: Mood normal

## 2022-07-31 VITALS
SYSTOLIC BLOOD PRESSURE: 128 MMHG | HEART RATE: 77 BPM | OXYGEN SATURATION: 96 % | TEMPERATURE: 97.3 F | DIASTOLIC BLOOD PRESSURE: 68 MMHG | RESPIRATION RATE: 18 BRPM

## 2022-07-31 PROBLEM — Z51.5 HOSPICE CARE PATIENT: Status: ACTIVE | Noted: 2022-07-18

## 2022-07-31 NOTE — ASSESSMENT & PLAN NOTE
· Patient notes that he has a history of bipolar type 2  · He appears to be in remission   · Continue current medication regimen   · Lamictal 25 mg daily   · Continue follow-up with Dr Celeste Lang [facility] psychiatrist

## 2022-07-31 NOTE — ASSESSMENT & PLAN NOTE
· Mood overall appears stable  · Patient notes that he is frustrated with his breathing and that this makes him feel depressed at times  · Denies SI/HI on exam   · Recently signed on to hospice care  · Continue current medication regimen   · Paxil 10 mg daily   · Continue supportive care

## 2022-07-31 NOTE — ASSESSMENT & PLAN NOTE
· Patient with history of COPD  · In the past several months patient has noted progressive worsening of his breathing  · Was evaluated by pulmonology and was offered additional workup  · Patient would need to be seen in the outpatient office for further testing  · Initially agreed to further testing and then declined  · Evaluated by palliative care at the [facility] facility and patient noted he was interested in hospice care   · Hospice care initiated in June   · Continue to manage care in collaboration with hospice

## 2022-07-31 NOTE — ASSESSMENT & PLAN NOTE
· Patient noted to have quit smoking approximately 2 years ago  · Notes progressively worsening shortness of breath   · Pulmonology recommended lung cancer screening   · Patient does not wish to pursue further workup  · Now on hospice care

## 2022-07-31 NOTE — ASSESSMENT & PLAN NOTE
· Patient has chronic history of COPD  · Notes increased shortness of breath over the past few month  · Noted to be on 3 L of oxygen on exam   · Nasal cannula noted to be in his mouth as he states he is a mouth breather  · Notes that he titrates his oxygen up by himself  · Recently signed on to hospice care as he does not wish to pursue any further workup for shortness of breath and COPD  · Patient not in any acute respiratory distress on exam today   · Lungs are clear on auscultation   · Continue current medication regimen   · Prednisone 5 mg daily   · Ipratropium-albuterol five times per day and Q 6 hours prn   · Trelegy daily   · Albuterol-sulfate Q 6 hours prn   · Continue to monitor

## 2022-07-31 NOTE — ASSESSMENT & PLAN NOTE
· Patient noted to have periods of anxiety   · Patient denies that this is contributing to his shortness of breath   · Recently signed on to hospice care   · Initiated ativan 1 mg Q 8 hours PRN  · Continue current medication regimen   · Buspirone 10 mg BID  · Ativan 1 mg Q 8 hours PRN  · Continue supportive care  · Continue to monitor

## 2022-07-31 NOTE — PROGRESS NOTES
[facility]  [address]  [phone]  [place]  Code 28 ([facility])        NAME: Veronica Templeton  AGE: 67 y o  SEX: male CODE STATUS: DNR/DNI/DNH/Hospice    DATE OF ENCOUNTER: 7/18/2022    Assessment and Plan     1  Hospice care patient  Assessment & Plan:  · Patient with history of COPD  · In the past several months patient has noted progressive worsening of his breathing  · Was evaluated by pulmonology and was offered additional workup  · Patient would need to be seen in the outpatient office for further testing  · Initially agreed to further testing and then declined  · Evaluated by palliative care at the [facility] facility and patient noted he was interested in hospice care   · Hospice care initiated in June   · Continue to manage care in collaboration with hospice       2  Chronic obstructive pulmonary disease with acute exacerbation (HCC)  Assessment & Plan:  · Patient has chronic history of COPD  · Notes increased shortness of breath over the past few month  · Noted to be on 3 L of oxygen on exam   · Nasal cannula noted to be in his mouth as he states he is a mouth breather  · Notes that he titrates his oxygen up by himself  · Recently signed on to hospice care as he does not wish to pursue any further workup for shortness of breath and COPD  · Patient not in any acute respiratory distress on exam today   · Lungs are clear on auscultation   · Continue current medication regimen   · Prednisone 5 mg daily   · Ipratropium-albuterol five times per day and Q 6 hours prn   · Trelegy daily   · Albuterol-sulfate Q 6 hours prn   · Continue to monitor      3   Anxiety  Assessment & Plan:  · Patient noted to have periods of anxiety   · Patient denies that this is contributing to his shortness of breath   · Recently signed on to hospice care   · Initiated ativan 1 mg Q 8 hours PRN  · Continue current medication regimen   · Buspirone 10 mg BID  · Ativan 1 mg Q 8 hours PRN  · Continue supportive care  · Continue to monitor      4  Gastroesophageal reflux disease without esophagitis  Assessment & Plan:  · Stable  · Continue current medication regimen  · Omeprazole 20 mg daily   · Calcium carbonate 500 mg Q 6 hours prn      5  Bipolar affective disorder, remission status unspecified ([facility] [address] )  Assessment & Plan:  · Patient notes that he has a history of bipolar type 2  · He appears to be in remission   · Continue current medication regimen   · Lamictal 25 mg daily   · Continue follow-up with Dr Jose Rodríguez [facility] psychiatrist      6  Major depressive disorder with current active episode, unspecified depression episode severity, unspecified whether recurrent  Assessment & Plan:  · Mood overall appears stable  · Patient notes that he is frustrated with his breathing and that this makes him feel depressed at times  · Denies SI/HI on exam   · Recently signed on to hospice care  · Continue current medication regimen   · Paxil 10 mg daily   · Continue supportive care      7  Personal history of tobacco use  Assessment & Plan:  · Patient noted to have quit smoking approximately 2 years ago  · Notes progressively worsening shortness of breath   · Pulmonology recommended lung cancer screening   · Patient does not wish to pursue further workup  · Now on hospice care      8  Vitamin D deficiency  Assessment & Plan:  · Most recent vitamin D level on 12/21 noted to be low at 21  · Continue current medication regimen   · Ergocalciferol 26439 units weekly  · Patient now under hospice care  · Will hold off on future labs         All medications and routine orders were reviewed and updated as needed  Chief Complaint     LT follow-up visit  History of Present Illness     Pritesh Powell is a 67year old male who is a long term care resident of [facility]  His past medical history includes but is not limited to GERD, COPD, anxiety, bipolar, depression, and protein calorie malnutrition   He is being seen and evaluated today in collaboration with nursing for medical management and LTC follow-up  The patient was seen and evaluated today at the bedside  The patient is noted to be lying in bed comfortably in no acute distress  He is alert and oriented x4 and is able to make his needs known  He notes that he is feeling okay today  He continues to note that he is short of breath and is asking to keep his rescue inhaler at the bedside  He notes that it takes nursing too long to come and give him his rescue inhaler when he needs it so he just does not ask for it  He is noted to be on supplemental oxygen with a nasal canula in his mouth  He was just recently signed on to hospice care as he did not wish to pursue further pulmonology follow-up  He denies dizziness, lightheadedness, headaches, and vision changes  He denies pain on exam  He notes no chest pain, palpitations, or cough  He denies nausea, vomiting, constipation, and diarrhea  He notes no issues with his bowels or bladder  He note that he has difficulty making it to the bathroom to have a bowel movement due to his shortness of breath  He uses the urinal at bedside when voiding  Per the SNF records, his last bowel movement was noted to be on 7/15  He states that he has a good appetite and is trying to stay hydrated  Per the SNF records, he is eating 2-3 meals per day, consuming % of each meal  There are no concerns from nursing at this time  The patient's allergies, past medical, surgical, social and family history were reviewed and unchanged  Review of Systems     Review of Systems   Constitutional: Positive for activity change  Negative for appetite change, chills, fatigue and fever  HENT: Negative for congestion, rhinorrhea and sore throat  Respiratory: Positive for shortness of breath  Negative for cough and chest tightness  Cardiovascular: Negative for chest pain and palpitations     Gastrointestinal: Negative for abdominal distention, abdominal pain, constipation, diarrhea, nausea and vomiting  Genitourinary: Negative for difficulty urinating, dysuria, frequency and urgency  Musculoskeletal: Positive for gait problem  Negative for arthralgias and myalgias  Skin: Negative for color change and pallor  Neurological: Positive for weakness (generalized)  Negative for dizziness, light-headedness, numbness and headaches  Psychiatric/Behavioral: Negative for behavioral problems, confusion and sleep disturbance  The patient is nervous/anxious (at times)  Objective     Vitals: Per SNF records     Vitals:    07/18/22 1701   BP: 128/68   Pulse: 77   Resp: 18   Temp: (!) 97 3 °F (36 3 °C)   SpO2: 96%       Physical Exam  Vitals and nursing note reviewed  Constitutional:       General: He is not in acute distress  Appearance: Normal appearance  He is normal weight  He is not ill-appearing  HENT:      Head: Normocephalic  Nose: No congestion or rhinorrhea  Mouth/Throat:      Mouth: Mucous membranes are dry  Cardiovascular:      Rate and Rhythm: Normal rate and regular rhythm  Pulses: Normal pulses  Heart sounds: Normal heart sounds  No murmur heard  Pulmonary:      Effort: Pulmonary effort is normal  No respiratory distress  Breath sounds: Normal breath sounds  No wheezing, rhonchi or rales  Comments: Patient is able to take deep breaths on exam   Nasal cannula noted to be in the patients mouth  Abdominal:      General: Bowel sounds are normal  There is no distension  Palpations: Abdomen is soft  Tenderness: There is no abdominal tenderness  Musculoskeletal:         General: No swelling, tenderness or signs of injury  Skin:     General: Skin is warm and dry  Coloration: Skin is not pale  Findings: No bruising or erythema  Neurological:      General: No focal deficit present  Mental Status: He is alert and oriented to person, place, and time  Mental status is at baseline        Motor: Weakness (generalized) present  Gait: Gait abnormal    Psychiatric:         Mood and Affect: Mood is anxious (at times)  Speech: Speech normal          Behavior: Behavior is agitated (when discussing his breathing and having a rescue inhaler at the bedside)  Behavior is cooperative  Thought Content: Thought content normal        Pertinent Laboratory/Diagnostic Studies:   Reviewed in facility chart-stable    Current Medications   Medications reviewed and updated see facility [facility] for details        Current Outpatient Medications:     acetaminophen (TYLENOL) 325 mg tablet, Take 650 mg by mouth every 6 (six) hours as needed, Disp: , Rfl:     albuterol (2 5 mg/3 mL) 0 083 % nebulizer solution, Take 2 5 mg by nebulization every 6 (six) hours as needed 2 puffs every 6 hours PRN, Disp: , Rfl:     benzonatate (TESSALON PERLES) 100 mg capsule, Take 100 mg by mouth 3 (three) times a day as needed, Disp: , Rfl:     busPIRone (BUSPAR) 10 mg tablet, Take 10 mg by mouth 2 (two) times a day, Disp: , Rfl:     calcium carbonate (TUMS) 500 mg chewable tablet, Chew 1 tablet every 6 (six) hours as needed, Disp: , Rfl:     diphenhydrAMINE (BENADRYL) 25 mg tablet, Take 25 mg by mouth every 6 (six) hours as needed for allergies, Disp: , Rfl:     ergocalciferol (ERGOCALCIFEROL) 1 25 MG (41324 UT) capsule, Take 50,000 Units by mouth once a week, Disp: , Rfl:     fluticasone-umeclidinium-vilanterol (TRELEGY) 100-62 5-25 MCG/INH inhaler, Inhale 1 puff daily Rinse mouth after use , Disp: , Rfl:     furosemide (LASIX) 40 mg tablet, Take 40 mg by mouth daily, Disp: , Rfl:     guaiFENesin (MUCINEX) 600 mg 12 hr tablet, Take 1,200 mg by mouth every 12 (twelve) hours, Disp: , Rfl:     ipratropium-albuterol (DUO-NEB) 0 5-2 5 mg/3 mL nebulizer solution, Take 3 mL by nebulization every 6 (six) hours as needed for shortness of breath or wheezing, Disp: , Rfl:     ketoconazole (NIZORAL) 2 % cream, Apply topically every 12 (twelve) hours Apply to face topically for rash; use for 4 weeks (started 3/02/2022), Disp: , Rfl:     lamoTRIgine (LaMICtal) 25 mg tablet, Take 50 mg by mouth daily, Disp: , Rfl:     LORazepam (ATIVAN) 0 5 mg tablet, Take 0 5 mg by mouth every 8 (eight) hours as needed for anxiety, Disp: , Rfl:     magnesium hydroxide (MILK OF MAGNESIA) 400 mg/5 mL oral suspension, Take by mouth daily as needed for constipation, Disp: , Rfl:     Morphine Sulfate, Concentrate, 20 mg/mL concentrated solution, Take 0 25 mL (5 mg total) by mouth every 6 (six) hours as needed for severe painMax Daily Amount: 20 mg, Disp: 60 mL, Rfl: 0    omeprazole (PriLOSEC) 20 mg delayed release capsule, Take 20 mg by mouth 2 (two) times a day, Disp: , Rfl:     PARoxetine (PAXIL) 20 mg tablet, Take 10 mg by mouth daily, Disp: , Rfl:     potassium chloride (K-DUR,KLOR-CON) 20 mEq tablet, Take 20 mEq by mouth daily, Disp: , Rfl:     Protein (PROSOURCE PO), Take 30 mL by mouth in the morning, Disp: , Rfl:     sodium chloride (OCEAN) 0 65 % nasal spray, 1 spray into each nostril every 12 (twelve) hours as needed, Disp: , Rfl:      Plan discussed with Dr Gladys Morris noted agreement with assessment and plan  Please note:  Voice-recognition software may have been used in the preparation of this document  Occasional wrong word or "sound-alike" substitutions may have occurred due to the inherent limitations of voice recognition software  Interpretation should be guided by STEVIE Quintanilla  7/24/2022  12:02 PM

## 2022-07-31 NOTE — ASSESSMENT & PLAN NOTE
· Most recent vitamin D level on 12/21 noted to be low at 21  · Continue current medication regimen   · Ergocalciferol 64979 units weekly  · Patient now under hospice care  · Will hold off on future labs

## 2022-08-29 ENCOUNTER — NURSING HOME VISIT (OUTPATIENT)
Dept: GERIATRICS | Facility: OTHER | Age: 72
End: 2022-08-29
Payer: MEDICARE

## 2022-08-29 VITALS
TEMPERATURE: 97.5 F | RESPIRATION RATE: 18 BRPM | DIASTOLIC BLOOD PRESSURE: 78 MMHG | WEIGHT: 149.3 LBS | HEART RATE: 76 BPM | BODY MASS INDEX: 20.25 KG/M2 | SYSTOLIC BLOOD PRESSURE: 122 MMHG | OXYGEN SATURATION: 96 %

## 2022-08-29 DIAGNOSIS — J44.1 CHRONIC OBSTRUCTIVE PULMONARY DISEASE WITH ACUTE EXACERBATION (HCC): Primary | ICD-10-CM

## 2022-08-29 PROCEDURE — 99309 SBSQ NF CARE MODERATE MDM 30: CPT | Performed by: INTERNAL MEDICINE

## 2022-08-29 NOTE — PROGRESS NOTES
[address]  [address]   [address] [facility] [address] [facility] [address]  49     Progress Note                          Code [facility]  Patient Location     [facility]    Reason for visit     F U COPD, Anxiety, chronic respiratory failure, GERD    Patients care was coordinated with nursing facility staff  Recent vitals, labs and updated medications were reviewed on [facility] system of [facility]  Problem List Items Addressed This Visit        Respiratory    Chronic obstructive pulmonary disease with acute exacerbation ([facility] [address] ) - Primary     Patient has advanced COPD, remains on chronic oxygen needing up to 5 L to maintain adequate SaO2  No bronchospasm at present  Continue Trelegy inhaler along with nebulizer treatments 5 times a day  Patient remains under hospice care has p r n  Orders for morphine sulfate  Additionally remains on Roxanol 5 mg q 12 hours along with p r n  Ativan  GERD:  Stable on omeprazole      Chronic respiratory failure:  Continue home oxygen, nebulizer treatments and Trelegy inhaler    History of tobacco abuse:  Patient quit smoking approximately 2 years ago  Patient declined screening CT chest in the past     Depression:  Stable on Paxil 20 mg daily    Bipolar disorder:  Patient remains on lamotrigine and Paxil    Vitamin-D deficiency:  Continue vitamin-D supplements    HPI       Patient is being seen for a follow-up visit today  Remains under hospice care  Patient is awake alert able to make his needs known  Reports having shortness of breath with minimal exertion  SaO2 remains stable at rest   No reports of any fever chills wheezing GI or  complaints  Review of Systems   Constitutional: Negative for chills and fever  Respiratory: Positive for shortness of breath   Negative for cough, chest tightness and stridor  Cardiovascular: Negative for chest pain and leg swelling     Gastrointestinal: Negative for abdominal distention, abdominal pain, diarrhea and vomiting  Genitourinary: Negative for dysuria, flank pain and hematuria  Musculoskeletal: Negative for arthralgias and back pain  Skin: Negative for pallor  Neurological: Negative for tremors, seizures, syncope, weakness (Generalized) and headaches  Psychiatric/Behavioral: Negative for agitation, behavioral problems and confusion   anxiety +      Past Medical History:   Diagnosis Date    Acute and chronic respiratory failure with hypoxia (HCC)     Asthma     CHF (congestive heart failure) (HCC)     COPD (chronic obstructive pulmonary disease) (HCC)     Delirium     Hypokalemia     Hyponatremia          Social History     Tobacco Use   Smoking Status Former Smoker    Packs/day: 2 00    Types: Cigarettes    Quit date:     Years since quittin 6   Smokeless Tobacco Former User    Types: Chew       Family History   Problem Relation Age of Onset    Brain cancer Mother     Heart disease Father         Allergies   Allergen Reactions    Other Allergic Rhinitis     Seasonal Allergies    Penicillins          Current Outpatient Medications:     acetaminophen (TYLENOL) 325 mg tablet, Take 650 mg by mouth every 6 (six) hours as needed, Disp: , Rfl:     albuterol (2 5 mg/3 mL) 0 083 % nebulizer solution, Take 2 5 mg by nebulization every 6 (six) hours as needed 2 puffs every 6 hours PRN, Disp: , Rfl:     benzonatate (TESSALON PERLES) 100 mg capsule, Take 100 mg by mouth 3 (three) times a day as needed, Disp: , Rfl:     busPIRone (BUSPAR) 10 mg tablet, Take 10 mg by mouth 2 (two) times a day, Disp: , Rfl:     calcium carbonate (TUMS) 500 mg chewable tablet, Chew 1 tablet every 6 (six) hours as needed, Disp: , Rfl:     diphenhydrAMINE (BENADRYL) 25 mg tablet, Take 25 mg by mouth every 6 (six) hours as needed for allergies, Disp: , Rfl:     ergocalciferol (ERGOCALCIFEROL) 1 25 MG (91954 UT) capsule, Take 50,000 Units by mouth once a week, Disp: , Rfl:   fluticasone-umeclidinium-vilanterol (TRELEGY) 100-62 5-25 MCG/INH inhaler, Inhale 1 puff daily Rinse mouth after use , Disp: , Rfl:     furosemide (LASIX) 40 mg tablet, Take 40 mg by mouth daily, Disp: , Rfl:     guaiFENesin (MUCINEX) 600 mg 12 hr tablet, Take 1,200 mg by mouth every 12 (twelve) hours, Disp: , Rfl:     ipratropium-albuterol (DUO-NEB) 0 5-2 5 mg/3 mL nebulizer solution, Take 3 mL by nebulization every 6 (six) hours as needed for shortness of breath or wheezing, Disp: , Rfl:     ketoconazole (NIZORAL) 2 % cream, Apply topically every 12 (twelve) hours Apply to face topically for rash; use for 4 weeks (started 3/02/2022), Disp: , Rfl:     lamoTRIgine (LaMICtal) 25 mg tablet, Take 50 mg by mouth daily, Disp: , Rfl:     LORazepam (ATIVAN) 0 5 mg tablet, Take 0 5 mg by mouth every 8 (eight) hours as needed for anxiety, Disp: , Rfl:     magnesium hydroxide (MILK OF MAGNESIA) 400 mg/5 mL oral suspension, Take by mouth daily as needed for constipation, Disp: , Rfl:     Morphine Sulfate, Concentrate, 20 mg/mL concentrated solution, Take 0 25 mL (5 mg total) by mouth every 6 (six) hours as needed for severe painMax Daily Amount: 20 mg, Disp: 60 mL, Rfl: 0    omeprazole (PriLOSEC) 20 mg delayed release capsule, Take 20 mg by mouth 2 (two) times a day, Disp: , Rfl:     PARoxetine (PAXIL) 20 mg tablet, Take 10 mg by mouth daily, Disp: , Rfl:     potassium chloride (K-DUR,KLOR-CON) 20 mEq tablet, Take 20 mEq by mouth daily, Disp: , Rfl:     Protein (PROSOURCE PO), Take 30 mL by mouth in the morning, Disp: , Rfl:     sodium chloride (OCEAN) 0 65 % nasal spray, 1 spray into each nostril every 12 (twelve) hours as needed, Disp: , Rfl:     Updated list was reviewed in [facility] system of facility  Vitals:    08/29/22 1012   BP: 122/78   Pulse: 76   Resp: 18   Temp: 97 5 °F (36 4 °C)   SpO2: 96%       Physical Exam  HENT:      Head: Normocephalic and atraumatic  Nose: No rhinorrhea  Eyes:      General: No scleral icterus  Right eye: No discharge  Left eye: No discharge  Cardiovascular:      Rate and Rhythm: Normal rate and regular rhythm  Pulmonary:      Effort: No respiratory distress  Breath sounds: No wheezing or rales  Comments: Decreased breath sounds bilaterally  Abdominal:      General: There is no distension  Palpations: Abdomen is soft  Tenderness: There is no abdominal tenderness  There is no guarding  Musculoskeletal:      Cervical back: Neck supple  Right lower leg: No edema  Left lower leg: No edema  Skin:     Coloration: Skin is not jaundiced  Neurological:      General: No focal deficit present  Cranial Nerves: No cranial nerve deficit        Comments: Oriented in month and year   Psychiatric:         Mood and Affect: Mood normal               Diagnostic Data:  Lab Results   Component Value Date    WBC 12 51 (H) 06/12/2022    HGB 13 3 06/12/2022    HCT 40 3 06/12/2022    MCV 90 06/12/2022     06/12/2022       Lab Results   Component Value Date    SODIUM 135 06/12/2022    K 4 3 06/12/2022    CL 94 (L) 06/12/2022    CO2 32 06/12/2022    BUN 38 (H) 06/12/2022    CREATININE 0 85 06/12/2022    GLUC 112 06/12/2022    CALCIUM 8 9 06/12/2022     Further labs are not being done as patient is under hospice care    This note was electronically signed by Dr Henrique Bowen

## 2022-08-29 NOTE — ASSESSMENT & PLAN NOTE
Patient has advanced COPD, remains on chronic oxygen needing up to 5 L to maintain adequate SaO2  No bronchospasm at present  Continue Trelegy inhaler along with nebulizer treatments 5 times a day  Patient remains under hospice care has p r n  Orders for morphine sulfate  Additionally remains on Roxanol 5 mg q 12 hours along with p r n  Ativan

## 2022-09-20 ENCOUNTER — NURSING HOME VISIT (OUTPATIENT)
Dept: GERIATRICS | Facility: OTHER | Age: 72
End: 2022-09-20
Payer: MEDICARE

## 2022-09-20 DIAGNOSIS — Z87.891 PERSONAL HISTORY OF TOBACCO USE: ICD-10-CM

## 2022-09-20 DIAGNOSIS — F32.9 MAJOR DEPRESSIVE DISORDER WITH CURRENT ACTIVE EPISODE, UNSPECIFIED DEPRESSION EPISODE SEVERITY, UNSPECIFIED WHETHER RECURRENT: ICD-10-CM

## 2022-09-20 DIAGNOSIS — K21.9 GASTROESOPHAGEAL REFLUX DISEASE WITHOUT ESOPHAGITIS: ICD-10-CM

## 2022-09-20 DIAGNOSIS — K59.00 CONSTIPATION, UNSPECIFIED CONSTIPATION TYPE: Primary | ICD-10-CM

## 2022-09-20 DIAGNOSIS — F31.9 BIPOLAR AFFECTIVE DISORDER, REMISSION STATUS UNSPECIFIED (HCC): ICD-10-CM

## 2022-09-20 DIAGNOSIS — Z51.5 HOSPICE CARE PATIENT: ICD-10-CM

## 2022-09-20 DIAGNOSIS — F41.9 ANXIETY: ICD-10-CM

## 2022-09-20 DIAGNOSIS — J44.1 CHRONIC OBSTRUCTIVE PULMONARY DISEASE WITH ACUTE EXACERBATION (HCC): ICD-10-CM

## 2022-09-20 DIAGNOSIS — E55.9 VITAMIN D DEFICIENCY: ICD-10-CM

## 2022-09-20 PROCEDURE — 99309 SBSQ NF CARE MODERATE MDM 30: CPT

## 2022-09-27 VITALS
RESPIRATION RATE: 18 BRPM | SYSTOLIC BLOOD PRESSURE: 126 MMHG | DIASTOLIC BLOOD PRESSURE: 60 MMHG | HEART RATE: 58 BPM | OXYGEN SATURATION: 98 % | TEMPERATURE: 97 F

## 2022-09-27 PROBLEM — K59.00 CONSTIPATION: Status: ACTIVE | Noted: 2022-09-20

## 2022-09-27 NOTE — ASSESSMENT & PLAN NOTE
· Patient with history of COPD  · In the past several months patient has noted progressive worsening of his breathing  · Was evaluated by pulmonology and was offered additional workup  · Patient would need to be seen in the outpatient office for further testing  · Initially agreed to further testing and then declined  · Evaluated by palliative care at the [facility] facility and patient noted he was interested in hospice care   · Hospice care initiated in June   · Continue to manage care in collaboration with hospice

## 2022-09-27 NOTE — PROGRESS NOTES
[facility]  [address]  [phone]  [address]  [address] ([facility])        NAME: Lashae Dhillon  AGE: 67 y o  SEX: male CODE STATUS: DNR/DNI/DNH/Hospice    DATE OF ENCOUNTER: 9/20/2022    Assessment and Plan     1  Constipation, unspecified constipation type  Assessment & Plan:  · Patient states that he is feeling a bit constipated  · Notes that this has been going on for some time now  · States that he does not want to walk to the bathroom secondary to his breathing so he holds his stool in   · Notes that he also has decreased appetite likely secondary to this   · States that staff will not provide him with a commode or bedpan because he is ambulatory   · Nursing notes that the patient was having diarrhea last week and received imodium but was noted to be impacted with stool   · He did require laxatives to resolve   · Last bowel movement noted to be on 9/16  · Will discuss with hospice to see if they can obtain a bedside commode for the patient   · Discussed with staff that the patient is hospice and the goal is comfort so if the patient asks for a bedpan he should be receiving a bedpan so that he can have regular bowel movements       2   Chronic obstructive pulmonary disease with acute exacerbation (HCC)  Assessment & Plan:  · Patient has chronic history of COPD  · Notes increased shortness of breath over the past several months  · Noted to be on 3 L of oxygen on exam   · Nasal cannula noted to be in his mouth as he states he is a mouth breather  · Notes that he titrates his oxygen up by himself when he feels short of breath   · Is now currently receiving hospice care as he does not wish to pursue any further workup for shortness of breath and COPD  · Patient not in any acute respiratory distress on exam today   · Lungs are clear on auscultation   · Continue current medication regimen   · Prednisone 5 mg daily   · Ipratropium-albuterol five times per day and Q 6 hours prn · Trelegy daily   · Albuterol-sulfate Q 6 hours prn   · Continue to monitor      3  Hospice care patient  Assessment & Plan:  · Patient with history of COPD  · In the past several months patient has noted progressive worsening of his breathing  · Was evaluated by pulmonology and was offered additional workup  · Patient would need to be seen in the outpatient office for further testing  · Initially agreed to further testing and then declined  · Evaluated by palliative care at the [facility] facility and patient noted he was interested in hospice care   · Hospice care initiated in June   · Continue to manage care in collaboration with hospice       4  Gastroesophageal reflux disease without esophagitis  Assessment & Plan:  · Stable  · Continue current medication regimen  · Omeprazole 20 mg daily   · Calcium carbonate 500 mg Q 6 hours prn      5  Bipolar affective disorder, remission status unspecified ([facility] [address] )  Assessment & Plan:  · Patient notes that he has a history of bipolar type 2  · He appears to be in remission   · Continue current medication regimen   · Lamictal 25 mg daily   · Continue follow-up with Dr Stefania Sevilla [facility] psychiatrist      6  Anxiety  Assessment & Plan:  · Patient noted to have periods of anxiety   · Patient denies that this is contributing to his shortness of breath   · Currently receiving hospice care   · Order for ativan 1 mg Q 8 hours PRN  · Continue current medication regimen   · Buspirone 10 mg BID  · Ativan 1 mg Q 8 hours PRN  · Continue supportive care  · Continue to monitor      7   Major depressive disorder with current active episode, unspecified depression episode severity, unspecified whether recurrent  Assessment & Plan:  · Mood overall appears stable  · Patient notes that he is frustrated with his breathing and that this makes him feel depressed at times  · Denies SI/HI on exam   · Currently receiving hospice care  · Continue current medication regimen   · Paxil 10 mg daily   · Continue supportive care      8  Vitamin D deficiency  Assessment & Plan:  · Most recent vitamin D level on 12/21 noted to be low at 21  · Continue current medication regimen   · Ergocalciferol 32166 units weekly  · Patient now under hospice care  · Will hold off on future labs      9  Personal history of tobacco use  Assessment & Plan:  · Patient noted to have quit smoking approximately 2 years ago  · Notes progressively worsening shortness of breath   · Pulmonology recommended lung cancer screening   · Patient does not wish to pursue further workup  · Now on hospice care         All medications and routine orders were reviewed and updated as needed  Chief Complaint     LTC follow-up visit  History of Present Illness     Kandice Mello is a 67year old male who is a long term care resident of [address] [place] [address]  His past medical history includes but is not limited to GERD, COPD, anxiety, bipolar, depression, and protein calorie malnutrition  He is being seen and evaluated today in collaboration with nursing for medical management and LTC follow-up  The patient was seen and evaluated today at the bedside  The patient is noted to be lying in bed comfortably in no acute distress  He is alert and oriented x4 and is able to make his needs known  He notes that he is feeling okay today  He continues to note that he is short of breath and notes increased difficulty ambulating back and forth to the bathroom  He states that he has not had a bowel movement in days because he does not want to get up to go to the bathroom and become short of breath  Nursing notes that he was impacted with stool last week and did require laxatives to get him to have a bowel movement  He states that he does feel slightly constipated today but is okay with it as long as he does not have to walk to the bathroom  He states that staff has informed him that he cannot have a commode in the room and that they have been refusing to offer him a bedpan   Per the SNF records, his last bowel movement was noted to be on 9/16  He denies any issues with voiding and notes that he is using the urinal without issue  He denies pain and numbness or tingling of his extremities on exam today  He denies dizziness, lightheadedness, headaches, and vision changes  He denies chest pain and palpitations  He notes chronic shortness of breath and is noted to have the supplemental oxygen in his mouth on exam today  He states that his appetite is fair and that he has been trying to stay hydrated  Per the SNF records, he is eating 1-3 meals per day, consuming % of each meal  There are no concerns from nursing at this time  The patient's allergies, past medical, surgical, social and family history were reviewed and unchanged  Review of Systems     Review of Systems   Constitutional: Positive for activity change  Negative for appetite change, chills, fatigue and fever  HENT: Negative for congestion, rhinorrhea and sore throat  Respiratory: Positive for shortness of breath  Negative for cough and chest tightness  Cardiovascular: Negative for chest pain and palpitations  Gastrointestinal: Positive for constipation  Negative for abdominal distention, abdominal pain, diarrhea, nausea and vomiting  Genitourinary: Negative for difficulty urinating, dysuria, frequency and urgency  Musculoskeletal: Positive for gait problem  Negative for arthralgias and myalgias  Skin: Negative for color change and pallor  Neurological: Positive for weakness (generalized)  Negative for dizziness, light-headedness, numbness and headaches  Psychiatric/Behavioral: Negative for behavioral problems, confusion and sleep disturbance  The patient is nervous/anxious (at times)  Objective     Vitals: Per SNF records     Vitals:    09/20/22 2301   BP: 126/60   Pulse: 58   Resp: 18   Temp: (!) 97 °F (36 1 °C)   SpO2: 98%       Physical Exam  Vitals and nursing note reviewed     Constitutional: General: He is not in acute distress  Appearance: Normal appearance  He is normal weight  He is not ill-appearing  HENT:      Head: Normocephalic  Nose: No congestion or rhinorrhea  Mouth/Throat:      Mouth: Mucous membranes are dry  Cardiovascular:      Rate and Rhythm: Normal rate and regular rhythm  Pulses: Normal pulses  Heart sounds: Normal heart sounds  No murmur heard  Pulmonary:      Effort: Pulmonary effort is normal  No respiratory distress  Breath sounds: Normal breath sounds  No wheezing, rhonchi or rales  Comments: Patient is able to take deep breaths on exam   Nasal cannula noted to be in the patients mouth  Abdominal:      General: Bowel sounds are normal  There is no distension  Palpations: Abdomen is soft  Tenderness: There is no abdominal tenderness  Musculoskeletal:         General: No swelling, tenderness or signs of injury  Skin:     General: Skin is warm and dry  Coloration: Skin is not pale  Findings: No bruising or erythema  Neurological:      General: No focal deficit present  Mental Status: He is alert and oriented to person, place, and time  Mental status is at baseline  Motor: Weakness (generalized) present  Gait: Gait abnormal    Psychiatric:         Mood and Affect: Mood is anxious (at times)  Speech: Speech normal          Behavior: Behavior is cooperative  Thought Content: Thought content normal        Pertinent Laboratory/Diagnostic Studies:   Reviewed in facility chart-stable    Current Medications   Medications reviewed and updated see facility [facility] for details        Current Outpatient Medications:     acetaminophen (TYLENOL) 325 mg tablet, Take 650 mg by mouth every 6 (six) hours as needed, Disp: , Rfl:     albuterol (2 5 mg/3 mL) 0 083 % nebulizer solution, Take 2 5 mg by nebulization every 6 (six) hours as needed 2 puffs every 6 hours PRN, Disp: , Rfl:     benzonatate (TESSALON PERLES) 100 mg capsule, Take 100 mg by mouth 3 (three) times a day as needed, Disp: , Rfl:     busPIRone (BUSPAR) 10 mg tablet, Take 10 mg by mouth 2 (two) times a day, Disp: , Rfl:     calcium carbonate (TUMS) 500 mg chewable tablet, Chew 1 tablet every 6 (six) hours as needed, Disp: , Rfl:     diphenhydrAMINE (BENADRYL) 25 mg tablet, Take 25 mg by mouth every 6 (six) hours as needed for allergies, Disp: , Rfl:     ergocalciferol (ERGOCALCIFEROL) 1 25 MG (58593 UT) capsule, Take 50,000 Units by mouth once a week, Disp: , Rfl:     fluticasone-umeclidinium-vilanterol (TRELEGY) 100-62 5-25 MCG/INH inhaler, Inhale 1 puff daily Rinse mouth after use , Disp: , Rfl:     furosemide (LASIX) 40 mg tablet, Take 40 mg by mouth daily, Disp: , Rfl:     guaiFENesin (MUCINEX) 600 mg 12 hr tablet, Take 1,200 mg by mouth every 12 (twelve) hours, Disp: , Rfl:     ipratropium-albuterol (DUO-NEB) 0 5-2 5 mg/3 mL nebulizer solution, Take 3 mL by nebulization every 6 (six) hours as needed for shortness of breath or wheezing, Disp: , Rfl:     ketoconazole (NIZORAL) 2 % cream, Apply topically every 12 (twelve) hours Apply to face topically for rash; use for 4 weeks (started 3/02/2022), Disp: , Rfl:     lamoTRIgine (LaMICtal) 25 mg tablet, Take 50 mg by mouth daily, Disp: , Rfl:     LORazepam (ATIVAN) 0 5 mg tablet, Take 0 5 mg by mouth every 8 (eight) hours as needed for anxiety, Disp: , Rfl:     magnesium hydroxide (MILK OF MAGNESIA) 400 mg/5 mL oral suspension, Take by mouth daily as needed for constipation, Disp: , Rfl:     Morphine Sulfate, Concentrate, 20 mg/mL concentrated solution, Take 0 25 mL (5 mg total) by mouth every 6 (six) hours as needed for severe painMax Daily Amount: 20 mg, Disp: 60 mL, Rfl: 0    omeprazole (PriLOSEC) 20 mg delayed release capsule, Take 20 mg by mouth 2 (two) times a day, Disp: , Rfl:     PARoxetine (PAXIL) 20 mg tablet, Take 10 mg by mouth daily, Disp: , Rfl:     potassium chloride (K-DUR,KLOR-CON) 20 mEq tablet, Take 20 mEq by mouth daily, Disp: , Rfl:     Protein (PROSOURCE PO), Take 30 mL by mouth in the morning, Disp: , Rfl:     sodium chloride (OCEAN) 0 65 % nasal spray, 1 spray into each nostril every 12 (twelve) hours as needed, Disp: , Rfl:      Plan discussed with Dr Cheryl Luna noted agreement with assessment and plan  Please note:  Voice-recognition software may have been used in the preparation of this document  Occasional wrong word or "sound-alike" substitutions may have occurred due to the inherent limitations of voice recognition software  Interpretation should be guided by context           STEVIE Cardona  9/26/2022  11:39 PM

## 2022-09-27 NOTE — ASSESSMENT & PLAN NOTE
· Patient noted to have periods of anxiety   · Patient denies that this is contributing to his shortness of breath   · Currently receiving hospice care   · Order for ativan 1 mg Q 8 hours PRN  · Continue current medication regimen   · Buspirone 10 mg BID  · Ativan 1 mg Q 8 hours PRN  · Continue supportive care  · Continue to monitor

## 2022-09-27 NOTE — ASSESSMENT & PLAN NOTE
· Mood overall appears stable  · Patient notes that he is frustrated with his breathing and that this makes him feel depressed at times  · Denies SI/HI on exam   · Currently receiving hospice care  · Continue current medication regimen   · Paxil 10 mg daily   · Continue supportive care

## 2022-09-27 NOTE — ASSESSMENT & PLAN NOTE
· Patient states that he is feeling a bit constipated  · Notes that this has been going on for some time now  · States that he does not want to walk to the bathroom secondary to his breathing so he holds his stool in   · Notes that he also has decreased appetite likely secondary to this   · States that staff will not provide him with a commode or bedpan because he is ambulatory   · Nursing notes that the patient was having diarrhea last week and received imodium but was noted to be impacted with stool   · He did require laxatives to resolve   · Last bowel movement noted to be on 9/16  · Will discuss with hospice to see if they can obtain a bedside commode for the patient   · Discussed with staff that the patient is hospice and the goal is comfort so if the patient asks for a bedpan he should be receiving a bedpan so that he can have regular bowel movements

## 2022-09-27 NOTE — ASSESSMENT & PLAN NOTE
· Patient has chronic history of COPD  · Notes increased shortness of breath over the past several months  · Noted to be on 3 L of oxygen on exam   · Nasal cannula noted to be in his mouth as he states he is a mouth breather  · Notes that he titrates his oxygen up by himself when he feels short of breath   · Is now currently receiving hospice care as he does not wish to pursue any further workup for shortness of breath and COPD  · Patient not in any acute respiratory distress on exam today   · Lungs are clear on auscultation   · Continue current medication regimen   · Prednisone 5 mg daily   · Ipratropium-albuterol five times per day and Q 6 hours prn   · Trelegy daily   · Albuterol-sulfate Q 6 hours prn   · Continue to monitor

## 2022-09-27 NOTE — ASSESSMENT & PLAN NOTE
· Patient notes that he has a history of bipolar type 2  · He appears to be in remission   · Continue current medication regimen   · Lamictal 25 mg daily   · Continue follow-up with Dr Flores [facility] psychiatrist

## 2022-09-27 NOTE — ASSESSMENT & PLAN NOTE
· Most recent vitamin D level on 12/21 noted to be low at 21  · Continue current medication regimen   · Ergocalciferol 36379 units weekly  · Patient now under hospice care  · Will hold off on future labs

## 2022-10-04 ENCOUNTER — TELEPHONE (OUTPATIENT)
Dept: OTHER | Facility: OTHER | Age: 72
End: 2022-10-04

## 2022-10-04 DIAGNOSIS — J44.9 CHRONIC OBSTRUCTIVE PULMONARY DISEASE, UNSPECIFIED COPD TYPE (HCC): ICD-10-CM

## 2022-10-04 DIAGNOSIS — J43.9 PULMONARY EMPHYSEMA, UNSPECIFIED EMPHYSEMA TYPE (HCC): ICD-10-CM

## 2022-10-04 RX ORDER — MORPHINE SULFATE 100 MG/5ML
5 SOLUTION, CONCENTRATE ORAL EVERY 6 HOURS PRN
Qty: 30 ML | Refills: 0 | Status: SHIPPED | OUTPATIENT
Start: 2022-10-04 | End: 2022-11-03

## 2022-10-21 ENCOUNTER — NURSING HOME VISIT (OUTPATIENT)
Dept: GERIATRICS | Facility: OTHER | Age: 72
End: 2022-10-21
Payer: MEDICARE

## 2022-10-21 VITALS
TEMPERATURE: 97.8 F | DIASTOLIC BLOOD PRESSURE: 77 MMHG | HEART RATE: 74 BPM | BODY MASS INDEX: 19.48 KG/M2 | SYSTOLIC BLOOD PRESSURE: 132 MMHG | OXYGEN SATURATION: 97 % | WEIGHT: 143.6 LBS | RESPIRATION RATE: 18 BRPM

## 2022-10-21 DIAGNOSIS — J44.1 CHRONIC OBSTRUCTIVE PULMONARY DISEASE WITH ACUTE EXACERBATION (HCC): Primary | ICD-10-CM

## 2022-10-21 PROCEDURE — 99309 SBSQ NF CARE MODERATE MDM 30: CPT | Performed by: INTERNAL MEDICINE

## 2022-10-22 NOTE — PROGRESS NOTES
Kristel Page  [facility]  [address]   [address] [place], [place] [address]Lm U  49     Progress Note                          Code LT 32  Patient Location     [facility] rehab    Reason for visit     F U chronic respiratory failure, COPD, Anxiety,GERD       Problem List Items Addressed This Visit        Respiratory    Chronic obstructive pulmonary disease with acute exacerbation ([facility] [address] ) - Primary     Patient has advanced COPD  Remains on chronic oxygen  Daily activities are severely limited due to dyspnea  Patient stays in the room all the time  Continue nebulizer treatments along with Trelegy inhaler  Patient remains under hospice  Continue p r n  for discomfort related to dyspnea               Chronic respiratory failure:  Patient remains on chronic oxygen  Reports getting SOB with minimal activities  Continue nebulizer treatments and Trelegy inhaler  Patient remains under hospice service  Was advised to use p r n  morphine for discomfort related to dyspnea      Depression in and anxiety:  Patient remains on buspirone 10 mg t i d  and lorazepam 0 5 mg t i d   Continue same  Currently not on Paxil  Bipolar disorder:  Continue Lamotrigine     Vitamin-D deficiency:  Continue vitamin-D supplement    GERD:  Stable on omeprazole    HPI       Patient is being seen for a follow-up visit  Reports feeling short of breath with minimal activities  Remains on chronic oxygen  Patient is bedbound, does not go out of his room  States walking to the restroom causes increased shortness of breath  Currently has a bedside commode  Remains under hospice care  Patient has repeatedly requested to have albuterol inhaler at bedside however based on history patient uses inhaler more frequently than prescribed and therefore keeping inhaler at bedside has been discouraged by the facility      Review of Systems   Constitutional: Negative for chills and fever     Respiratory: Positive for shortness of breath   Negative for cough, chest tightness  Cardiovascular: Negative for chest pain and leg swelling  Gastrointestinal: Negative for abdominal pain, diarrhea and vomiting  Genitourinary: Negative for dysuria, flank pain and hematuria  Musculoskeletal: Negative for arthralgias and back pain  Neurological: Negative for tremors, seizures, syncope, and headaches  Psychiatric/Behavioral: Negative for agitation, behavioral problems and confusion    Patient tends to get anxious at times      Past Medical History:   Diagnosis Date   • Acute and chronic respiratory failure with hypoxia ([facility])    • Asthma    • CHF (congestive heart failure) ([facility])    • COPD (chronic obstructive pulmonary disease) ([facility])    • Delirium    • Hypokalemia    • Hyponatremia          Social History     Tobacco Use   Smoking Status Former Smoker   • Packs/day: 2 00   • Types: Cigarettes   • Quit date:    • Years since quittin 8   Smokeless Tobacco Former User   • Types: Chew       Family History   Problem Relation Age of Onset   • Brain cancer Mother    • Heart disease Father         Allergies   Allergen Reactions   • Other Allergic Rhinitis     Seasonal Allergies   • Penicillins          Current Outpatient Medications:   •  acetaminophen (TYLENOL) 325 mg tablet, Take 650 mg by mouth every 6 (six) hours as needed, Disp: , Rfl:   •  albuterol (2 5 mg/3 mL) 0 083 % nebulizer solution, Take 2 5 mg by nebulization every 6 (six) hours as needed 2 puffs every 6 hours PRN, Disp: , Rfl:   •  benzonatate (TESSALON PERLES) 100 mg capsule, Take 100 mg by mouth 3 (three) times a day as needed, Disp: , Rfl:   •  busPIRone (BUSPAR) 10 mg tablet, Take 10 mg by mouth 2 (two) times a day, Disp: , Rfl:   •  calcium carbonate (TUMS) 500 mg chewable tablet, Chew 1 tablet every 6 (six) hours as needed, Disp: , Rfl:   •  ergocalciferol (ERGOCALCIFEROL) 1 25 MG (57240 UT) capsule, Take 50,000 Units by mouth once a week, Disp: , Rfl:   • fluticasone-umeclidinium-vilanterol (TRELEGY) 100-62 5-25 MCG/INH inhaler, Inhale 1 puff daily Rinse mouth after use , Disp: , Rfl:   •  furosemide (LASIX) 40 mg tablet, Take 40 mg by mouth daily, Disp: , Rfl:   •  guaiFENesin (MUCINEX) 600 mg 12 hr tablet, Take 1,200 mg by mouth every 12 (twelve) hours, Disp: , Rfl:   •  ipratropium-albuterol (DUO-NEB) 0 5-2 5 mg/3 mL nebulizer solution, Take 3 mL by nebulization every 6 (six) hours as needed for shortness of breath or wheezing, Disp: , Rfl:   •  ketoconazole (NIZORAL) 2 % cream, Apply topically every 12 (twelve) hours Apply to face topically for rash; use for 4 weeks (started 3/02/2022), Disp: , Rfl:   •  lamoTRIgine (LaMICtal) 25 mg tablet, Take 50 mg by mouth daily, Disp: , Rfl:   •  LORazepam (ATIVAN) 0 5 mg tablet, Take 0 5 mg by mouth every 8 (eight) hours as needed for anxiety, Disp: , Rfl:   •  magnesium hydroxide (MILK OF MAGNESIA) 400 mg/5 mL oral suspension, Take by mouth daily as needed for constipation, Disp: , Rfl:   •  Morphine Sulfate, Concentrate, 20 mg/mL concentrated solution, Take 0 25 mL (5 mg total) by mouth every 6 (six) hours as needed for severe pain Max Daily Amount: 20 mg, Disp: 30 mL, Rfl: 0  •  omeprazole (PriLOSEC) 20 mg delayed release capsule, Take 20 mg by mouth 2 (two) times a day, Disp: , Rfl:   •  potassium chloride (K-DUR,KLOR-CON) 20 mEq tablet, Take 20 mEq by mouth daily, Disp: , Rfl:   •  sodium chloride (OCEAN) 0 65 % nasal spray, 1 spray into each nostril every 12 (twelve) hours as needed, Disp: , Rfl:     Updated list was reviewed in [facility] system of facility  Vitals:    10/20/22 2110   BP: 132/77   Pulse: 74   Resp: 18   Temp: 97 8 °F (36 6 °C)   SpO2: 97%       Physical Exam  HENT:      Head: Normocephalic and atraumatic  Nose: No rhinorrhea  Eyes:      General: No scleral icterus  Right eye: No discharge  Left eye: No discharge     Cardiovascular:      Rate and Rhythm: Normal rate and regular rhythm  Pulmonary:   Patient is a month breather, uses nasal cannula through the mouth to breathe better     Effort: No respiratory distress  Breath sounds: No wheezing or rales  Comments: Decreased breath sounds bilaterally  Abdominal:      General: There is no distension  Palpations: Abdomen is soft  Tenderness: There is no abdominal tenderness  There is no guarding  Musculoskeletal:      Cervical back: Neck supple  Right lower leg: No edema  Left lower leg: No edema  Skin:     Coloration: Skin is not jaundiced  Neurological:      General: No focal deficit present  Cranial Nerves: No cranial nerve deficit                       Diagnostic Data:  Lab Results   Component Value Date    WBC 12 51 (H) 06/12/2022    HGB 13 3 06/12/2022    HCT 40 3 06/12/2022    MCV 90 06/12/2022     06/12/2022       Lab Results   Component Value Date    SODIUM 135 06/12/2022    K 4 3 06/12/2022    CL 94 (L) 06/12/2022    CO2 32 06/12/2022    BUN 38 (H) 06/12/2022    CREATININE 0 85 06/12/2022    GLUC 112 06/12/2022    CALCIUM 8 9 06/12/2022     Further labs are not being done as patient is under hospice care    This note was electronically signed by Dr Pierce Gregory

## 2022-10-22 NOTE — ASSESSMENT & PLAN NOTE
Patient has advanced COPD  Remains on chronic oxygen  Daily activities are severely limited due to dyspnea  Patient stays in the room all the time  Continue nebulizer treatments along with Trelegy inhaler  Patient remains under hospice    Continue p r n  for discomfort related to dyspnea

## 2022-11-16 ENCOUNTER — NURSING HOME VISIT (OUTPATIENT)
Dept: GERIATRICS | Facility: OTHER | Age: 72
End: 2022-11-16

## 2022-11-16 VITALS
DIASTOLIC BLOOD PRESSURE: 80 MMHG | SYSTOLIC BLOOD PRESSURE: 140 MMHG | OXYGEN SATURATION: 97 % | RESPIRATION RATE: 18 BRPM | HEART RATE: 74 BPM | BODY MASS INDEX: 19.07 KG/M2 | WEIGHT: 140.6 LBS | TEMPERATURE: 97.7 F

## 2022-11-16 DIAGNOSIS — K21.9 GASTROESOPHAGEAL REFLUX DISEASE WITHOUT ESOPHAGITIS: ICD-10-CM

## 2022-11-16 DIAGNOSIS — Z51.5 HOSPICE CARE PATIENT: ICD-10-CM

## 2022-11-16 DIAGNOSIS — F31.9 BIPOLAR AFFECTIVE DISORDER, REMISSION STATUS UNSPECIFIED (HCC): ICD-10-CM

## 2022-11-16 DIAGNOSIS — J44.1 CHRONIC OBSTRUCTIVE PULMONARY DISEASE WITH ACUTE EXACERBATION (HCC): Primary | ICD-10-CM

## 2022-11-16 RX ORDER — DIPHENHYDRAMINE HCL 25 MG
25 TABLET ORAL EVERY 6 HOURS PRN
COMMUNITY

## 2022-11-16 RX ORDER — MORPHINE SULFATE 100 MG/5ML
5 SOLUTION ORAL
COMMUNITY

## 2022-11-16 NOTE — PROGRESS NOTES
[facility]  [address]  [phone]  Olu Lorenz  Code 28 (LTC)        NAME: Wade Richardson  AGE: 67 y o  SEX: male CODE STATUS: No CPR    DATE OF ENCOUNTER: 11/16/2022    Assessment and Plan     1  Chronic obstructive pulmonary disease with acute exacerbation (HCC)  Assessment & Plan:  · History of advanced COPD on chronic oxygen  · On 5 L NC today O2 97%  · Patient reports an increase in SOB, worse with activity  · Patient able to have conversation  · Currently under Hospice care  · Continue DuoNebs q 6 hours, albuterol inhaler Q6 hours  · Continue Trelegy inhaler  · Continue PRN MSO4 and ativan for dyspnea/anxiety      2  Bipolar affective disorder, remission status unspecified ([facility] )  Assessment & Plan:  · History of Bipolar disorder  · Mood currently stable  · Continue Lamotrigine 50 mg daily      3  Gastroesophageal reflux disease without esophagitis  Assessment & Plan:  · History of GERD  · No complaints of reflux at this time  · Continue omeprazole 20 mg daily  · Continue Tums 500 mg q6 hours prn      4  Hospice care patient  Assessment & Plan:  · Advanced COPD, chronic oxygen 5 L  · Patient states he has worsening SOB  · Declined further pulmonology workup  · Manage respiratory symptoms with oxygen, duo nebs, inhalers, MSO4 and ativan  · Continue Hospice care           All medications and routine orders were reviewed and updated as needed  Chief Complaint     [facility] follow up visit   Patient's care was coordinated with nursing facility staff  Recent vitals, labs, and updated medications were review on [facility] system in facility  Past Medical and Surgical History      Past Medical History:   Diagnosis Date   • Acute and chronic respiratory failure with hypoxia (HCC)    • Asthma    • CHF (congestive heart failure) (HCC)    • COPD (chronic obstructive pulmonary disease) (HCC)    • Delirium    • Hypokalemia    • Hyponatremia      History reviewed   No pertinent surgical history  Allergies   Allergen Reactions   • Other Allergic Rhinitis     Seasonal Allergies   • Penicillins           History of Present Illness     HPI  Sahara Calles is a 67year old male, he is a LTC resident of [facility] since 6/13/22  Past Medical Hx including but not limited to COPD, CHF, Bipolar disorder, GERD, vitamin D deficiency  He was seen in collaboration with nursing for medical mgmt and LTC follow up  Keyur Manzanares was seen and examined at bedside today  Patient is a reliable historian and is AAOx4  He is resting comfortably in bed having just finished a breathing treatment  He states that he has worsening SOB and was surprised at how fast he has been declining  He states he has trouble doing activities as he gets very short of breath  He is currently on 5 L oxygen at 97%  He has PRN inhalers, morphine, and ativan for dyspnea  Hospice is following patient  He states he has been sleeping well  Denies CP/SOB/N/V/D  Denies lightheadedness, dizziness, headaches, vision changes  Patient states they are eating well and staying hydrated  Denies any bowel or bladder issues  Per review of SNF records, Patient is eating 2-3 meals per day, consuming  %  Last documented BM 11/15/22  No concerns from nursing at this time  The patient's allergies, past medical, surgical, social and family history were reviewed and unchanged  Review of Systems     Review of Systems   Constitutional: Positive for activity change and unexpected weight change  HENT: Negative for congestion  Eyes: Negative  Respiratory: Positive for cough and shortness of breath  Chronic O2, 5 L   Cardiovascular: Negative  Negative for chest pain, palpitations and leg swelling  Gastrointestinal: Negative  Negative for abdominal distention, abdominal pain, constipation, diarrhea, nausea and vomiting  Endocrine: Negative  Genitourinary: Negative  Negative for difficulty urinating     Musculoskeletal: Negative  Skin: Negative  Allergic/Immunologic: Negative  Neurological: Negative  Negative for dizziness, weakness and headaches  Hematological: Negative  Psychiatric/Behavioral: Negative  Negative for sleep disturbance  The patient is not nervous/anxious  Objective     Vitals:   Vitals:    11/16/22 1542   BP: 140/80   Pulse: 74   Resp: 18   Temp: 97 7 °F (36 5 °C)   SpO2: 97%         Physical Exam  Vitals and nursing note reviewed  Constitutional:       General: He is not in acute distress  Appearance: Normal appearance  He is not ill-appearing  HENT:      Head: Normocephalic and atraumatic  Nose: No congestion  Mouth/Throat:      Mouth: Mucous membranes are moist    Eyes:      Conjunctiva/sclera: Conjunctivae normal    Cardiovascular:      Rate and Rhythm: Normal rate and regular rhythm  Pulses: Normal pulses  Heart sounds: Normal heart sounds  Pulmonary:      Effort: Respiratory distress present  Breath sounds: Normal breath sounds  Comments: Advanced COPD, chronic O2 5 L, RR 22, O2 sat 97%  Abdominal:      General: Bowel sounds are normal  There is no distension  Palpations: Abdomen is soft  Tenderness: There is no abdominal tenderness  Musculoskeletal:      Right lower leg: No edema  Left lower leg: No edema  Skin:     General: Skin is warm  Capillary Refill: Capillary refill takes more than 3 seconds  Neurological:      Mental Status: He is alert and oriented to person, place, and time  Motor: No weakness  Gait: Gait normal    Psychiatric:         Mood and Affect: Mood normal          Behavior: Behavior normal          Pertinent Laboratory/Diagnostic Studies:   Reviewed in facility chart-stable      Current Medications   Medications reviewed and updated see facility [facility] for details        Current Outpatient Medications:   •  diphenhydrAMINE (BENADRYL) 25 mg tablet, Take 25 mg by mouth every 6 (six) hours as needed allergies, Disp: , Rfl:   •  morphine 20 mg/mL concentrated solution, Take 5 mg by mouth every hour as needed dyspnea, Disp: , Rfl:   •  acetaminophen (TYLENOL) 325 mg tablet, Take 650 mg by mouth every 6 (six) hours as needed, Disp: , Rfl:   •  albuterol (2 5 mg/3 mL) 0 083 % nebulizer solution, Take 2 5 mg by nebulization every 6 (six) hours as needed 2 puffs every 6 hours PRN, Disp: , Rfl:   •  benzonatate (TESSALON PERLES) 100 mg capsule, Take 100 mg by mouth 3 (three) times a day as needed, Disp: , Rfl:   •  busPIRone (BUSPAR) 10 mg tablet, Take 10 mg by mouth 3 (three) times a day, Disp: , Rfl:   •  calcium carbonate (TUMS) 500 mg chewable tablet, Chew 1 tablet every 6 (six) hours as needed, Disp: , Rfl:   •  ergocalciferol (ERGOCALCIFEROL) 1 25 MG (50389 UT) capsule, Take 50,000 Units by mouth once a week, Disp: , Rfl:   •  fluticasone-umeclidinium-vilanterol (TRELEGY) 100-62 5-25 MCG/INH inhaler, Inhale 1 puff daily Rinse mouth after use , Disp: , Rfl:   •  furosemide (LASIX) 40 mg tablet, Take 40 mg by mouth daily, Disp: , Rfl:   •  guaiFENesin (MUCINEX) 600 mg 12 hr tablet, Take 1,200 mg by mouth every 12 (twelve) hours, Disp: , Rfl:   •  ipratropium-albuterol (DUO-NEB) 0 5-2 5 mg/3 mL nebulizer solution, Take 3 mL by nebulization every 6 (six) hours as needed for shortness of breath or wheezing, Disp: , Rfl:   •  ketoconazole (NIZORAL) 2 % cream, Apply topically every 12 (twelve) hours Apply to face topically for rash; use for 4 weeks (started 3/02/2022), Disp: , Rfl:   •  lamoTRIgine (LaMICtal) 25 mg tablet, Take 50 mg by mouth daily, Disp: , Rfl:   •  LORazepam (ATIVAN) 0 5 mg tablet, Take 0 5 mg by mouth every 8 (eight) hours as needed for anxiety, Disp: , Rfl:   •  magnesium hydroxide (MILK OF MAGNESIA) 400 mg/5 mL oral suspension, Take by mouth daily as needed for constipation, Disp: , Rfl:   •  omeprazole (PriLOSEC) 20 mg delayed release capsule, Take 20 mg by mouth 2 (two) times a day, Disp: , Rfl:   •  potassium chloride (K-DUR,KLOR-CON) 20 mEq tablet, Take 20 mEq by mouth daily, Disp: , Rfl:   •  sodium chloride (OCEAN) 0 65 % nasal spray, 1 spray into each nostril every 12 (twelve) hours as needed, Disp: , Rfl:      Plan discussed with Dr Gladys Morris noted agreement with assessment and plan  Please note:  Voice-recognition software may have been used in the preparation of this document  Occasional wrong word or "sound-alike" substitutions may have occurred due to the inherent limitations of voice recognition software  Interpretation should be guided by context           [facility] [address], CRNP  11/16/2022  4:07 PM

## 2022-11-16 NOTE — ASSESSMENT & PLAN NOTE
· Advanced COPD, chronic oxygen 5 L  · Patient states he has worsening SOB  · Declined further pulmonology workup  · Manage respiratory symptoms with oxygen, duo nebs, inhalers, MSO4 and ativan  · Continue Hospice care

## 2022-11-16 NOTE — ASSESSMENT & PLAN NOTE
· History of GERD  · No complaints of reflux at this time  · Continue omeprazole 20 mg daily  · Continue Tums 500 mg q6 hours prn

## 2022-11-16 NOTE — ASSESSMENT & PLAN NOTE
· History of advanced COPD on chronic oxygen  · On 5 L NC today O2 97%  · Patient reports an increase in SOB, worse with activity  · Patient able to have conversation  · Currently under Hospice care  · Continue DuoNebs q 6 hours, albuterol inhaler Q6 hours  · Continue Trelegy inhaler  · Continue PRN MSO4 and ativan for dyspnea/anxiety

## 2022-12-13 ENCOUNTER — NURSING HOME VISIT (OUTPATIENT)
Dept: GERIATRICS | Facility: OTHER | Age: 72
End: 2022-12-13

## 2022-12-13 VITALS
HEART RATE: 62 BPM | BODY MASS INDEX: 19.07 KG/M2 | OXYGEN SATURATION: 98 % | WEIGHT: 140.6 LBS | TEMPERATURE: 97.6 F | SYSTOLIC BLOOD PRESSURE: 116 MMHG | DIASTOLIC BLOOD PRESSURE: 68 MMHG

## 2022-12-13 DIAGNOSIS — J43.9 PULMONARY EMPHYSEMA, UNSPECIFIED EMPHYSEMA TYPE (HCC): Primary | ICD-10-CM

## 2022-12-13 NOTE — PROGRESS NOTES
[facility]  [address] [facility]   [address] [address] [place], [place], Lm [address]  48     Progress Note                                           Code [facility] 32  Patient Location     [facility] rehab    Reason for visit     F U , COPD, Anxiety,GERD       Problem List Items Addressed This Visit        Respiratory    COPD (chronic obstructive pulmonary disease) ([address] ) - Primary     History of advanced COPD  Patient tends to get short of breath with minimal activities  Currently remains under hospice services  Continue as needed morphine and Ativan for comfort  Continue maintenance inhalers including Trelegy, albuterol nebulizer treatments  Chronic respiratory failure:  Patient has history of chronic respiratory failure due to advanced COPD  Remains on chronic oxygen  Patient is a mouth breather, uses oxygen through the mouth  Reports being short of breath with minimal activities even going to bed side commode causes increased dyspnea  Patient remains under hospice service  Has as needed orders for morphine   Maintenance inhalers and nebulizer treatment    Bipolar disorder:  Stable on lamotrigine     Vitamin-D deficiency:  Continue vitamin-D supplement    Depression in and anxiety:  Continue buspirone 10 mg, 3 times daily and lorazepam 0 5 mg every 6 hrs ATC and every 4 hours as needed     GERD:  Stable on omeprazole    Chronic CHF:  Clinically euvolemic on Lasix 40 mg daily    HPI       Patient is being seen for follow-up visit today  He is doing okay at present  Reports being short of breath with minimal activities  Patient is confined to his room does not get out  Remains under hospice care  P o  intake is noted to be low at times  Patient has lost around 10 pounds during the last 8 months  Review of Systems   Constitutional: Negative for chills and fever  Respiratory: Positive for shortness of breath    Negative for cough, chest tightness  Cardiovascular: Negative for chest pain and leg swelling  Gastrointestinal: Negative for abdominal pain, diarrhea and vomiting  Genitourinary: Negative for dysuria,  and hematuria  Musculoskeletal: Negative for arthralgias and back pain  Neurological: Negative for , seizures, syncope, and headaches  Psychiatric/Behavioral: Negative for agitation, behavioral problems and confusion    anxious at times    Past Medical History:   Diagnosis Date   • Acute and chronic respiratory failure with hypoxia ([facility])    • Asthma    • CHF (congestive heart failure) ([facility])    • COPD (chronic obstructive pulmonary disease) ([facility])    • Delirium    • Hypokalemia    • Hyponatremia          Social History     Tobacco Use   Smoking Status Former   • Packs/day: 2 00   • Types: Cigarettes   • Quit date:    • Years since quittin 9   Smokeless Tobacco Former   • Types: Chew       Family History   Problem Relation Age of Onset   • Brain cancer Mother    • Heart disease Father         Allergies   Allergen Reactions   • Other Allergic Rhinitis     Seasonal Allergies   • Penicillins          Current Outpatient Medications:   •  acetaminophen (TYLENOL) 325 mg tablet, Take 650 mg by mouth every 6 (six) hours as needed, Disp: , Rfl:   •  albuterol (2 5 mg/3 mL) 0 083 % nebulizer solution, Take 2 5 mg by nebulization every 6 (six) hours as needed 2 puffs every 6 hours PRN, Disp: , Rfl:   •  benzonatate (TESSALON PERLES) 100 mg capsule, Take 100 mg by mouth 3 (three) times a day as needed, Disp: , Rfl:   •  busPIRone (BUSPAR) 10 mg tablet, Take 10 mg by mouth 3 (three) times a day, Disp: , Rfl:   •  calcium carbonate (TUMS) 500 mg chewable tablet, Chew 1 tablet every 6 (six) hours as needed, Disp: , Rfl:   •  diphenhydrAMINE (BENADRYL) 25 mg tablet, Take 25 mg by mouth every 6 (six) hours as needed allergies, Disp: , Rfl:   •  fluticasone-umeclidinium-vilanterol (TRELEGY) 100-62 5-25 MCG/INH inhaler, Inhale 1 puff daily Rinse mouth after use , Disp: , Rfl:   •  furosemide (LASIX) 40 mg tablet, Take 40 mg by mouth daily, Disp: , Rfl:   •  guaiFENesin (MUCINEX) 600 mg 12 hr tablet, Take 1,200 mg by mouth every 12 (twelve) hours, Disp: , Rfl:   •  ipratropium-albuterol (DUO-NEB) 0 5-2 5 mg/3 mL nebulizer solution, Take 3 mL by nebulization every 6 (six) hours as needed for shortness of breath or wheezing, Disp: , Rfl:   •  lamoTRIgine (LaMICtal) 25 mg tablet, Take 50 mg by mouth daily, Disp: , Rfl:   •  LORazepam (ATIVAN) 0 5 mg tablet, Take 0 5 mg by mouth every 8 (eight) hours as needed for anxiety + q 4 hrs prn for anxiety, Disp: , Rfl:   •  magnesium hydroxide (MILK OF MAGNESIA) 400 mg/5 mL oral suspension, Take by mouth daily as needed for constipation, Disp: , Rfl:   •  morphine 20 mg/mL concentrated solution, Take 5 mg by mouth every hour as needed dyspnea, Disp: , Rfl:   •  omeprazole (PriLOSEC) 20 mg delayed release capsule, Take 20 mg by mouth 2 (two) times a day, Disp: , Rfl:   •  potassium chloride (K-DUR,KLOR-CON) 20 mEq tablet, Take 20 mEq by mouth daily, Disp: , Rfl:   •  sodium chloride (OCEAN) 0 65 % nasal spray, 1 spray into each nostril every 12 (twelve) hours as needed, Disp: , Rfl:     Updated list was reviewed in [facility] system of facility  Vitals:    12/13/22 1305   BP: 116/68   Pulse: 62   Temp: 97 6 °F (36 4 °C)   SpO2: 98%       Physical Exam  HENT:      Head: Normocephalic and atraumatic  Nose: No rhinorrhea  Cardiovascular:      Rate and Rhythm: Normal rate and regular rhythm  Pulmonary:   Patient is a month breather, uses nasal cannula through the mouth to breathe better     Effort: No respiratory distress  Breath sounds: No wheezing or rales  Comments: Decreased breath sounds bilaterally  Abdominal:      General: There is no distension  Palpations: Abdomen is soft  Tenderness: There is no abdominal tenderness  Musculoskeletal:      Cervical back: Neck supple  Right lower leg: No edema  Left lower leg: No edema  Skin:     Coloration: Skin is not jaundiced  Neurological:      General: No focal deficit present  Cranial Nerves: No cranial nerve deficit                 Diagnostic Data:    No recent lab data available as patient is under hospice care  Lab Results   Component Value Date    WBC 12 51 (H) 06/12/2022    HGB 13 3 06/12/2022    HCT 40 3 06/12/2022    MCV 90 06/12/2022     06/12/2022       Lab Results   Component Value Date    SODIUM 135 06/12/2022    K 4 3 06/12/2022    CL 94 (L) 06/12/2022    CO2 32 06/12/2022    BUN 38 (H) 06/12/2022    CREATININE 0 85 06/12/2022    GLUC 112 06/12/2022    CALCIUM 8 9 06/12/2022         This note was electronically signed by Dr Cassandra Camarena

## 2022-12-13 NOTE — ASSESSMENT & PLAN NOTE
History of advanced COPD  Patient tends to get short of breath with minimal activities  Currently remains under hospice services  Continue as needed morphine and Ativan for comfort  Continue maintenance inhalers including Trelegy, albuterol nebulizer treatments

## 2023-01-30 ENCOUNTER — NURSING HOME VISIT (OUTPATIENT)
Dept: GERIATRICS | Facility: OTHER | Age: 73
End: 2023-01-30

## 2023-01-30 VITALS
TEMPERATURE: 97.5 F | HEART RATE: 74 BPM | OXYGEN SATURATION: 97 % | DIASTOLIC BLOOD PRESSURE: 79 MMHG | RESPIRATION RATE: 18 BRPM | SYSTOLIC BLOOD PRESSURE: 126 MMHG

## 2023-01-30 DIAGNOSIS — J43.9 PULMONARY EMPHYSEMA, UNSPECIFIED EMPHYSEMA TYPE (HCC): ICD-10-CM

## 2023-01-30 DIAGNOSIS — Z51.5 HOSPICE CARE PATIENT: ICD-10-CM

## 2023-01-30 DIAGNOSIS — F31.9 BIPOLAR AFFECTIVE DISORDER, REMISSION STATUS UNSPECIFIED (HCC): ICD-10-CM

## 2023-01-30 DIAGNOSIS — K21.9 GASTROESOPHAGEAL REFLUX DISEASE WITHOUT ESOPHAGITIS: Primary | ICD-10-CM

## 2023-01-30 RX ORDER — HALOPERIDOL 2 MG/ML
0.25 SOLUTION ORAL EVERY 6 HOURS PRN
COMMUNITY

## 2023-01-30 NOTE — ASSESSMENT & PLAN NOTE
· Advanced COPD, chronic oxygen 5 L  · Worsening shortness of breath  · Declined further pulmonology workup  · Manage respiratory symptoms with oxygen, duo nebs, inhalers, MSO4, ativan, Haldol  · Continue Hospice care, comfort measures

## 2023-01-30 NOTE — ASSESSMENT & PLAN NOTE
· History of advanced COPD on chronic oxygen  · On 5 L NC today O2 97%  · Per SNF records patient had been refusing to wear oxygen, restless, refusing medications yesterday  · Patient followed by hospice, order was placed for Haldol 0 25 mL every 6 hours as needed  · On exam today patient is comfortable and wearing his oxygen    · Continue DuoNebs q 6 hours, albuterol inhaler Q6 hours  · Continue Trelegy inhaler  · Continue PRN MSO4 and ativan for dyspnea/anxiety

## 2023-01-30 NOTE — ASSESSMENT & PLAN NOTE
· History of GERD  · Denies reflux  · Continue omeprazole 20 mg daily  · Continue Tums 500 mg q6 hours prn

## 2023-01-30 NOTE — PROGRESS NOTES
[facility]  [address]  [phone]  Erick Thao  Code  28 (LTC)      NAME: Klaudia Michel  AGE: 67 y o  SEX: male CODE STATUS: No CPR    DATE OF ENCOUNTER: 1/30/2023    Assessment and Plan     1  Gastroesophageal reflux disease without esophagitis  Assessment & Plan:  · History of GERD  · Denies reflux  · Continue omeprazole 20 mg daily  · Continue Tums 500 mg q6 hours prn      2  Pulmonary emphysema, unspecified emphysema type ([facility] )  Assessment & Plan:  · History of advanced COPD on chronic oxygen  · On 5 L NC today O2 97%  · Per SNF records patient had been refusing to wear oxygen, restless, refusing medications yesterday  · Patient followed by hospice, order was placed for Haldol 0 25 mL every 6 hours as needed  · On exam today patient is comfortable and wearing his oxygen  · Continue DuoNebs q 6 hours, albuterol inhaler Q6 hours  · Continue Trelegy inhaler  · Continue PRN MSO4 and ativan for dyspnea/anxiety      3  Bipolar affective disorder, remission status unspecified ([facility] )  Assessment & Plan:  · History of Bipolar disorder  · Mood currently stable  · Continue Lamotrigine 50 mg daily      4  Hospice care patient  Assessment & Plan:  · Advanced COPD, chronic oxygen 5 L  · Worsening shortness of breath  · Declined further pulmonology workup  · Manage respiratory symptoms with oxygen, duo nebs, inhalers, MSO4, ativan, Haldol  · Continue Hospice care, comfort measures         All medications and routine orders were reviewed and updated as needed  Chief Complaint     [facility] follow up visit   Patient's care was coordinated with nursing facility staff  Recent vitals, labs, and updated medications were review on [facility] system in facility        Past Medical and Surgical History      Past Medical History:   Diagnosis Date   • Acute and chronic respiratory failure with hypoxia (HCC)    • Asthma    • CHF (congestive heart failure) (HCC)    • COPD (chronic obstructive pulmonary disease) ([facility] [address] )    • Delirium    • Hypokalemia    • Hyponatremia      History reviewed  No pertinent surgical history  Allergies   Allergen Reactions   • Other Allergic Rhinitis     Seasonal Allergies   • Penicillins           History of Present Illness     PAULA Keating is a 67year old male, he is a LTC resident of [address] [place] [address] since 6/13/22  Past Medical Hx including but not limited to COPD, CHF, Bipolar disorder, GERD, vitamin D deficiency  He was seen in collaboration with nursing for medical mgmt and LTC follow up  Janee Mohan was seen and examined at bedside today  On exam patient is resting in bed and is awake and alert  Per SNF records patient had been restless, refusing medications, not wearing his O2 yesterday  Patient is followed by hospice, an order was placed for Haldol 0 25 mL every 6 hours, patient appears comfortable today  He is wearing his oxygen, 5 L at 97%  Patient denies pain, and says he has been sleeping well  He denies CP/SOB/N/V/D  Denies lightheadedness, dizziness, headaches, vision changes  Patient states they are eating well and staying hydrated  Denies any bowel or bladder issues  Per review of SNF records, Patient is eating 2-3 meals per day, consuming 50-75 %  Last documented BM 1/29/23  No concerns from nursing at this time  The patient's allergies, past medical, surgical, social and family history were reviewed and unchanged  Review of Systems     Review of Systems   Constitutional: Positive for activity change and appetite change  Negative for fever  HENT: Negative for congestion  Eyes: Negative  Respiratory: Positive for cough, shortness of breath and wheezing  Cardiovascular: Negative for chest pain, palpitations and leg swelling  Gastrointestinal: Negative  Negative for abdominal distention, abdominal pain, constipation, diarrhea, nausea and vomiting  Endocrine: Negative  Genitourinary: Negative  Negative for difficulty urinating  Musculoskeletal: Positive for gait problem  Skin: Negative  Allergic/Immunologic: Negative  Neurological: Positive for weakness  Negative for dizziness and headaches  Hematological: Negative  Psychiatric/Behavioral: Negative for sleep disturbance  Restlessness         Objective     Vitals:   Vitals:    01/30/23 1047   BP: 126/79   Pulse: 74   Resp: 18   Temp: 97 5 °F (36 4 °C)   SpO2: 97%         Physical Exam  Vitals and nursing note reviewed  Constitutional:       General: He is not in acute distress  Appearance: Normal appearance  He is ill-appearing  HENT:      Head: Normocephalic and atraumatic  Nose: No congestion  Mouth/Throat:      Mouth: Mucous membranes are dry  Eyes:      Conjunctiva/sclera: Conjunctivae normal    Cardiovascular:      Rate and Rhythm: Normal rate and regular rhythm  Pulses: Normal pulses  Heart sounds: Normal heart sounds  Pulmonary:      Effort: No respiratory distress  Breath sounds: No wheezing  Comments: Chronic O2 5 L  Abdominal:      General: Bowel sounds are normal  There is no distension  Palpations: Abdomen is soft  Tenderness: There is no abdominal tenderness  Musculoskeletal:      Right lower leg: No edema  Left lower leg: No edema  Skin:     General: Skin is warm  Capillary Refill: Capillary refill takes more than 3 seconds  Neurological:      Mental Status: He is alert  Mental status is at baseline  Motor: Weakness present  Gait: Gait abnormal    Psychiatric:         Mood and Affect: Mood normal          Pertinent Laboratory/Diagnostic Studies:   Reviewed in facility chart-stable      Current Medications   Medications reviewed and updated see facility [facility] for details        Current Outpatient Medications:   •  haloperidol (HALDOL) oral concentrated solution, Take 0 25 mL by mouth every 6 (six) hours as needed, Disp: , Rfl:   •  acetaminophen (TYLENOL) 325 mg tablet, Take 650 mg by mouth every 6 (six) hours as needed, Disp: , Rfl:   •  albuterol (2 5 mg/3 mL) 0 083 % nebulizer solution, Take 2 5 mg by nebulization every 6 (six) hours as needed 2 puffs every 6 hours PRN, Disp: , Rfl:   •  benzonatate (TESSALON PERLES) 100 mg capsule, Take 100 mg by mouth 3 (three) times a day as needed, Disp: , Rfl:   •  busPIRone (BUSPAR) 10 mg tablet, Take 10 mg by mouth 3 (three) times a day, Disp: , Rfl:   •  calcium carbonate (TUMS) 500 mg chewable tablet, Chew 1 tablet every 6 (six) hours as needed, Disp: , Rfl:   •  diphenhydrAMINE (BENADRYL) 25 mg tablet, Take 25 mg by mouth every 6 (six) hours as needed allergies, Disp: , Rfl:   •  fluticasone-umeclidinium-vilanterol (TRELEGY) 100-62 5-25 MCG/INH inhaler, Inhale 1 puff daily Rinse mouth after use , Disp: , Rfl:   •  furosemide (LASIX) 40 mg tablet, Take 40 mg by mouth daily, Disp: , Rfl:   •  guaiFENesin (MUCINEX) 600 mg 12 hr tablet, Take 600 mg by mouth every 12 (twelve) hours, Disp: , Rfl:   •  ipratropium-albuterol (DUO-NEB) 0 5-2 5 mg/3 mL nebulizer solution, Take 3 mL by nebulization every 6 (six) hours as needed for shortness of breath or wheezing, Disp: , Rfl:   •  lamoTRIgine (LaMICtal) 25 mg tablet, Take 50 mg by mouth daily, Disp: , Rfl:   •  LORazepam (ATIVAN) 0 5 mg tablet, Take 0 5 mg by mouth every 4 (four) hours, Disp: , Rfl:   •  magnesium hydroxide (MILK OF MAGNESIA) 400 mg/5 mL oral suspension, Take by mouth daily as needed for constipation, Disp: , Rfl:   •  morphine 20 mg/mL concentrated solution, Take 5 mg by mouth every hour as needed dyspnea, Disp: , Rfl:   •  omeprazole (PriLOSEC) 20 mg delayed release capsule, Take 20 mg by mouth 2 (two) times a day, Disp: , Rfl:   •  potassium chloride (K-DUR,KLOR-CON) 20 mEq tablet, Take 20 mEq by mouth daily, Disp: , Rfl:   •  sodium chloride (OCEAN) 0 65 % nasal spray, 1 spray into each nostril every 12 (twelve) hours as needed, Disp: , Rfl:        Please note:  Voice-recognition software may have been used in the preparation of this document  Occasional wrong word or "sound-alike" substitutions may have occurred due to the inherent limitations of voice recognition software  Interpretation should be guided by context           [address]  1/30/2023  4:27 PM

## 2023-02-06 ENCOUNTER — TELEPHONE (OUTPATIENT)
Age: 73
End: 2023-02-06

## 2023-02-06 NOTE — TELEPHONE ENCOUNTER
Mann Brainerd from [facility] ([phone]) called for signature on a death certificate       Case # [id number]  Time of death : 17:17  Date of Death:  2/3/23